# Patient Record
Sex: FEMALE | Race: WHITE | NOT HISPANIC OR LATINO | Employment: OTHER | URBAN - METROPOLITAN AREA
[De-identification: names, ages, dates, MRNs, and addresses within clinical notes are randomized per-mention and may not be internally consistent; named-entity substitution may affect disease eponyms.]

---

## 2017-01-13 ENCOUNTER — APPOINTMENT (OUTPATIENT)
Dept: GENERAL RADIOLOGY | Facility: HOSPITAL | Age: 62
End: 2017-01-13

## 2017-01-13 ENCOUNTER — HOSPITAL ENCOUNTER (EMERGENCY)
Facility: HOSPITAL | Age: 62
Discharge: HOME | End: 2017-01-13
Attending: EMERGENCY MEDICINE | Admitting: EMERGENCY MEDICINE

## 2017-01-13 VITALS
WEIGHT: 165 LBS | HEIGHT: 61 IN | BODY MASS INDEX: 31.15 KG/M2 | TEMPERATURE: 97.9 F | HEART RATE: 65 BPM | DIASTOLIC BLOOD PRESSURE: 76 MMHG | RESPIRATION RATE: 16 BRPM | SYSTOLIC BLOOD PRESSURE: 115 MMHG | OXYGEN SATURATION: 95 %

## 2017-01-13 DIAGNOSIS — IMO0001 ELEVATED BLOOD PRESSURE: ICD-10-CM

## 2017-01-13 DIAGNOSIS — R10.13 EPIGASTRIC ABDOMINAL PAIN: ICD-10-CM

## 2017-01-13 DIAGNOSIS — R07.89 ATYPICAL CHEST PAIN: Primary | ICD-10-CM

## 2017-01-13 LAB
ALBUMIN SERPL-MCNC: 4.2 G/DL (ref 3.2–4.8)
ALBUMIN/GLOB SERPL: 1.1 G/DL (ref 1.5–2.5)
ALP SERPL-CCNC: 133 U/L (ref 25–100)
ALT SERPL W P-5'-P-CCNC: 111 U/L (ref 7–40)
ANION GAP SERPL CALCULATED.3IONS-SCNC: 8 MMOL/L (ref 3–11)
AST SERPL-CCNC: 79 U/L (ref 0–33)
BASOPHILS # BLD AUTO: 0.02 10*3/MM3 (ref 0–0.2)
BASOPHILS NFR BLD AUTO: 0.2 % (ref 0–1)
BILIRUB SERPL-MCNC: 1.1 MG/DL (ref 0.3–1.2)
BNP SERPL-MCNC: 64 PG/ML (ref 0–100)
BUN BLD-MCNC: 11 MG/DL (ref 9–23)
BUN/CREAT SERPL: 12.2 (ref 7–25)
CALCIUM SPEC-SCNC: 9.7 MG/DL (ref 8.7–10.4)
CHLORIDE SERPL-SCNC: 103 MMOL/L (ref 99–109)
CO2 SERPL-SCNC: 26 MMOL/L (ref 20–31)
CREAT BLD-MCNC: 0.9 MG/DL (ref 0.6–1.3)
DEPRECATED RDW RBC AUTO: 46.3 FL (ref 37–54)
EOSINOPHIL # BLD AUTO: 0.08 10*3/MM3 (ref 0.1–0.3)
EOSINOPHIL NFR BLD AUTO: 0.8 % (ref 0–3)
ERYTHROCYTE [DISTWIDTH] IN BLOOD BY AUTOMATED COUNT: 12.5 % (ref 11.3–14.5)
GFR SERPL CREATININE-BSD FRML MDRD: 64 ML/MIN/1.73
GLOBULIN UR ELPH-MCNC: 3.9 GM/DL
GLUCOSE BLD-MCNC: 109 MG/DL (ref 70–100)
HCT VFR BLD AUTO: 48.6 % (ref 34.5–44)
HGB BLD-MCNC: 16.5 G/DL (ref 11.5–15.5)
HOLD SPECIMEN: NORMAL
HOLD SPECIMEN: NORMAL
IMM GRANULOCYTES # BLD: 0.02 10*3/MM3 (ref 0–0.03)
IMM GRANULOCYTES NFR BLD: 0.2 % (ref 0–0.6)
LIPASE SERPL-CCNC: 35 U/L (ref 6–51)
LYMPHOCYTES # BLD AUTO: 2.83 10*3/MM3 (ref 0.6–4.8)
LYMPHOCYTES NFR BLD AUTO: 28.9 % (ref 24–44)
MCH RBC QN AUTO: 34 PG (ref 27–31)
MCHC RBC AUTO-ENTMCNC: 34 G/DL (ref 32–36)
MCV RBC AUTO: 100.2 FL (ref 80–99)
MONOCYTES # BLD AUTO: 1.02 10*3/MM3 (ref 0–1)
MONOCYTES NFR BLD AUTO: 10.4 % (ref 0–12)
NEUTROPHILS # BLD AUTO: 5.83 10*3/MM3 (ref 1.5–8.3)
NEUTROPHILS NFR BLD AUTO: 59.5 % (ref 41–71)
PLATELET # BLD AUTO: 222 10*3/MM3 (ref 150–450)
PMV BLD AUTO: 9.7 FL (ref 6–12)
POTASSIUM BLD-SCNC: 4 MMOL/L (ref 3.5–5.5)
PROT SERPL-MCNC: 8.1 G/DL (ref 5.7–8.2)
RBC # BLD AUTO: 4.85 10*6/MM3 (ref 3.89–5.14)
SODIUM BLD-SCNC: 137 MMOL/L (ref 132–146)
TROPONIN I SERPL-MCNC: 0 NG/ML (ref 0–0.07)
WBC NRBC COR # BLD: 9.8 10*3/MM3 (ref 3.5–10.8)
WHOLE BLOOD HOLD SPECIMEN: NORMAL
WHOLE BLOOD HOLD SPECIMEN: NORMAL

## 2017-01-13 PROCEDURE — 36415 COLL VENOUS BLD VENIPUNCTURE: CPT

## 2017-01-13 PROCEDURE — 99283 EMERGENCY DEPT VISIT LOW MDM: CPT

## 2017-01-13 PROCEDURE — 84484 ASSAY OF TROPONIN QUANT: CPT

## 2017-01-13 PROCEDURE — 85025 COMPLETE CBC W/AUTO DIFF WBC: CPT | Performed by: EMERGENCY MEDICINE

## 2017-01-13 PROCEDURE — 71010 HC CHEST PA OR AP: CPT

## 2017-01-13 PROCEDURE — 80053 COMPREHEN METABOLIC PANEL: CPT | Performed by: EMERGENCY MEDICINE

## 2017-01-13 PROCEDURE — 93005 ELECTROCARDIOGRAM TRACING: CPT

## 2017-01-13 PROCEDURE — 83880 ASSAY OF NATRIURETIC PEPTIDE: CPT | Performed by: EMERGENCY MEDICINE

## 2017-01-13 PROCEDURE — 83690 ASSAY OF LIPASE: CPT | Performed by: EMERGENCY MEDICINE

## 2017-01-13 RX ORDER — ATORVASTATIN CALCIUM 40 MG/1
40 TABLET, FILM COATED ORAL DAILY
COMMUNITY
End: 2022-04-21

## 2017-01-13 RX ORDER — CLONIDINE HYDROCHLORIDE 0.1 MG/1
0.1 TABLET ORAL 3 TIMES DAILY PRN
Qty: 30 TABLET | Refills: 0 | Status: SHIPPED | OUTPATIENT
Start: 2017-01-13 | End: 2022-04-21

## 2017-01-13 RX ORDER — ASPIRIN 81 MG/1
81 TABLET, CHEWABLE ORAL DAILY
COMMUNITY

## 2017-01-13 RX ORDER — SODIUM CHLORIDE 0.9 % (FLUSH) 0.9 %
10 SYRINGE (ML) INJECTION AS NEEDED
Status: DISCONTINUED | OUTPATIENT
Start: 2017-01-13 | End: 2017-01-13 | Stop reason: HOSPADM

## 2017-01-13 RX ORDER — ASPIRIN 81 MG/1
324 TABLET, CHEWABLE ORAL ONCE
Status: DISCONTINUED | OUTPATIENT
Start: 2017-01-13 | End: 2017-01-13

## 2017-01-13 NOTE — DISCHARGE INSTRUCTIONS
If the top number of your blood pressure if over 150 or the bottom number is over 100, take a clonidine every 8 hours until symptoms improve.     If you begin having worsening chest pain return to the ED for re-evaluation.     Follow up with the heart and valve clinic, call for an appointment.     Measure your blood pressure 3 times per day at the same time each day for the next week and keep a log, bring it to Dr. Preston.     Follow up with Dr. Preston next week, call for an appointment.

## 2017-01-13 NOTE — ED PROVIDER NOTES
Subjective   HPI Comments: Funmilayo Cantu is a 61 y.o.female who presents to the ED with c/o CP. 4 days ago she began having a left sided chest tightness that radiated into her right shoulder, dizziness and mild nausea. She took 325 ASA and called her PCP who could not see her until today and went to bed. Upon waking her CP and nausea resolved but she had residual dizziness that has continued. Yesterday she began having epigastric abdominal pain and called her cardiologist who could not see her this week. Today she began having a HA and left sided CP again that radiated into her left shoulder and felt as though she could not take a deep breath. She was seen by her PCP at 1030 where her BP was elevated at 150/125 with no known hx of HTN and was given bystolic, ASA, clonidine and told to come to the ED. In the ED pt states that she continues to have a little bit of discomfort and dizziness but denies any current abdominal pain. She denies any diaphoresis, vomiting, diarrhea, fevers or other acute complaints.     Cardiologist Kwan     Patient is a 61 y.o. female presenting with chest pain.   History provided by:  Patient  Chest Pain   Pain location:  L chest  Pain quality: tightness    Pain radiates to:  L shoulder and R shoulder  Pain severity:  Mild  Onset quality:  Sudden  Duration:  5 days  Timing:  Intermittent  Progression:  Unchanged  Chronicity:  New  Relieved by:  Nothing  Worsened by:  Nothing  Associated symptoms: abdominal pain, dizziness, headache, nausea and shortness of breath (difficulty taking deep breath)    Associated symptoms: no cough, no diaphoresis, no fever, no lower extremity edema and no vomiting        Review of Systems   Constitutional: Negative for diaphoresis and fever.   Respiratory: Positive for shortness of breath (difficulty taking deep breath). Negative for cough.    Cardiovascular: Positive for chest pain.   Gastrointestinal: Positive for abdominal pain and nausea.  Negative for vomiting.   Neurological: Positive for dizziness and headaches.   All other systems reviewed and are negative.      Past Medical History   Diagnosis Date   • Hyperlipidemia        No Known Allergies    Past Surgical History   Procedure Laterality Date   • Breast biopsy       u/s guided   • Cholecystectomy     • Kidney stone surgery     • Dental procedure         Family History   Problem Relation Age of Onset   • Ovarian cancer Sister 49   • Breast cancer Other 30       Social History     Social History   • Marital status:      Spouse name: N/A   • Number of children: N/A   • Years of education: N/A     Social History Main Topics   • Smoking status: Never Smoker   • Smokeless tobacco: None   • Alcohol use Yes      Comment: 1 cocktail a day   • Drug use: No   • Sexual activity: Not Asked     Other Topics Concern   • None     Social History Narrative   • None         Objective   Physical Exam   Constitutional: She is oriented to person, place, and time. She appears well-developed and well-nourished. No distress.   HENT:   Head: Normocephalic and atraumatic.   Airway patent.    Eyes: Conjunctivae are normal. No scleral icterus.   Neck: Normal range of motion. Neck supple. No thyromegaly present.   Cardiovascular: Normal rate, regular rhythm and normal heart sounds.    Pulmonary/Chest: Effort normal and breath sounds normal. No respiratory distress. She exhibits tenderness (mild left pectoral tenderness.).   Abdominal: Soft. There is tenderness (mild LUQ/epigastric TTP ). There is no guarding.   Musculoskeletal: She exhibits no edema or tenderness.   No calf tenderness.    Lymphadenopathy:     She has no cervical adenopathy.   Neurological: She is alert and oriented to person, place, and time.   Skin: Skin is warm and dry. She is not diaphoretic.   Psychiatric: She has a normal mood and affect. Her behavior is normal.   Nursing note and vitals reviewed.      Procedures         ED Course  ED Course    Comment By Time   Old labs including LFTs and H/H reviewed by Dr. Mike Rhoades S Maryan 01/13 1502   On re-examination pt states that she feels improved. Dr. Mckeon discussed all labs and imaging with the pt and discharge. Discussed indications for return. Pt and spouse are agreeable with the plan of care.  Jf SERVIN Maryan 01/13 7863       Course of Care      Lab Results (last 24 hours)     Procedure Component Value Units Date/Time    Comprehensive Metabolic Panel [[id number]]  (Abnormal) Collected:  01/13/17 1055    Specimen:  Blood Updated:  01/13/17 1340     Glucose 105 (H) mg/dL      BUN 11 mg/dL      Creatinine 0.90 mg/dL      Sodium 137 mmol/L      Potassium 4.1 mmol/L      Chloride 103 mmol/L      CO2 28.0 mmol/L      Calcium 9.6 mg/dL      Total Protein 7.9 g/dL      Albumin 4.30 g/dL      ALT (SGPT) 108 (H) U/L      AST (SGOT) 79 (H) U/L      Alkaline Phosphatase 140 (H) U/L      Total Bilirubin 1.1 mg/dL      eGFR Non African Amer 64 mL/min/1.73      Globulin 3.6 gm/dL      A/G Ratio 1.2 (L) g/dL      BUN/Creatinine Ratio 12.2      Anion Gap 6.0 mmol/L     Narrative:       National Kidney Foundation Guidelines    Stage                           Description                             GFR                      1                               Normal or High                          90+  2                               Mild decrease                            60-89  3                               Moderate decrease                   30-59  4                               Severe decrease                       15-29  5                               Kidney failure                             <15    CBC & Differential [[id number]] Collected:  01/13/17 1055    Specimen:  Blood Updated:  01/13/17 1235    Narrative:       The following orders were created for panel order CBC & Differential.  Procedure                               Abnormality         Status                     ---------                                -----------         ------                     CBC Auto Differential[[id number]]         Abnormal            Final result                 Please view results for these tests on the individual orders.    TSH [[id number]]  (Normal) Collected:  01/13/17 1055    Specimen:  Blood Updated:  01/13/17 1340     TSH 2.144 mIU/mL     D-dimer, Quantitative [[id number]]  (Normal) Collected:  01/13/17 1055    Specimen:  Blood Updated:  01/13/17 1247     D-Dimer, Quantitative 0.46 mg/L (FEU)     Narrative:       Negative predictive value for exclusion of venous thromboembolism: < or = 0.5 mg/L (FEU)    CK-MB [[id number]]  (Normal) Collected:  01/13/17 1055    Specimen:  Blood Updated:  01/13/17 1340     CKMB 0.20 ng/mL     Troponin [[id number]]  (Normal) Collected:  01/13/17 1055    Specimen:  Blood Updated:  01/13/17 1331     Troponin I <0.006 ng/mL     Narrative:       Ultra Troponin I Reference Range:         <=0.039 ng/mL: Negative    0.04-0.779 ng/mL: Indeterminate Range. Suspicious of MI.  Clinical correlation required.       >=0.78  ng/mL: Consistent with myocardial injury.  Clinical correlation required.    CBC Auto Differential [[id number]]  (Abnormal) Collected:  01/13/17 1055    Specimen:  Blood Updated:  01/13/17 1235     WBC 9.27 10*3/mm3      RBC 4.87 10*6/mm3      Hemoglobin 16.6 (H) g/dL      Hematocrit 49.2 (H) %      .0 (H) fL      MCH 34.1 (H) pg      MCHC 33.7 g/dL      RDW 12.6 %      RDW-SD 46.8 fl      MPV 10.0 fL      Platelets 234 10*3/mm3      Neutrophil % 60.1 %      Lymphocyte % 28.4 %      Monocyte % 10.1 %      Eosinophil % 1.1 %      Basophil % 0.1 %      Immature Grans % 0.2 %      Neutrophils, Absolute 5.57 10*3/mm3      Lymphocytes, Absolute 2.63 10*3/mm3      Monocytes, Absolute 0.94 10*3/mm3      Eosinophils, Absolute 0.10 10*3/mm3      Basophils, Absolute 0.01 10*3/mm3      Immature Grans, Absolute 0.02 10*3/mm3     CBC & Differential [[id number]] Collected:  01/13/17 1236    Specimen:  Blood Updated:   01/13/17 1302    Narrative:       The following orders were created for panel order CBC & Differential.  Procedure                               Abnormality         Status                     ---------                               -----------         ------                     CBC Auto Differential[[id number]]         Abnormal            Final result                 Please view results for these tests on the individual orders.    Comprehensive Metabolic Panel [[id number]]  (Abnormal) Collected:  01/13/17 1236    Specimen:  Blood from Arm, Left Updated:  01/13/17 1315     Glucose 109 (H) mg/dL      BUN 11 mg/dL      Creatinine 0.90 mg/dL      Sodium 137 mmol/L      Potassium 4.0 mmol/L      Chloride 103 mmol/L      CO2 26.0 mmol/L      Calcium 9.7 mg/dL      Total Protein 8.1 g/dL      Albumin 4.20 g/dL      ALT (SGPT) 111 (H) U/L      AST (SGOT) 79 (H) U/L      Alkaline Phosphatase 133 (H) U/L      Total Bilirubin 1.1 mg/dL      eGFR Non African Amer 64 mL/min/1.73      Globulin 3.9 gm/dL      A/G Ratio 1.1 (L) g/dL      BUN/Creatinine Ratio 12.2      Anion Gap 8.0 mmol/L     Narrative:       National Kidney Foundation Guidelines    Stage                           Description                             GFR                      1                               Normal or High                          90+  2                               Mild decrease                            60-89  3                               Moderate decrease                   30-59  4                               Severe decrease                       15-29  5                               Kidney failure                             <15    Lipase [[id number]]  (Normal) Collected:  01/13/17 1236    Specimen:  Blood from Arm, Left Updated:  01/13/17 1315     Lipase 35 U/L     BNP [[id number]]  (Normal) Collected:  01/13/17 1236    Specimen:  Blood from Arm, Left Updated:  01/13/17 1318     BNP 64.0 pg/mL     CBC Auto Differential [[id number]]  (Abnormal)  Collected:  01/13/17 1236    Specimen:  Blood from Arm, Left Updated:  01/13/17 1302     WBC 9.80 10*3/mm3      RBC 4.85 10*6/mm3      Hemoglobin 16.5 (H) g/dL      Hematocrit 48.6 (H) %      .2 (H) fL      MCH 34.0 (H) pg      MCHC 34.0 g/dL      RDW 12.5 %      RDW-SD 46.3 fl      MPV 9.7 fL      Platelets 222 10*3/mm3      Neutrophil % 59.5 %      Lymphocyte % 28.9 %      Monocyte % 10.4 %      Eosinophil % 0.8 %      Basophil % 0.2 %      Immature Grans % 0.2 %      Neutrophils, Absolute 5.83 10*3/mm3      Lymphocytes, Absolute 2.83 10*3/mm3      Monocytes, Absolute 1.02 (H) 10*3/mm3      Eosinophils, Absolute 0.08 (L) 10*3/mm3      Basophils, Absolute 0.02 10*3/mm3      Immature Grans, Absolute 0.02 10*3/mm3     POC Troponin, Rapid [[id number]]  (Normal) Collected:  01/13/17 1241    Specimen:  Blood Updated:  01/13/17 1255     Troponin I 0.00 ng/mL       Serial Number: [id number]    : [id number]             Note: In addition to lab results from this visit, the labs listed above may include labs taken at another facility or during a different encounter within the last 24 hours. Please correlate lab times with ED admission and discharge times for further clarification of the services performed during this visit.    XR Chest 1 View   Final Result   No active disease.       D:  01/13/2017   E:  01/13/2017       This report was finalized on 1/13/2017 2:38 PM by Dr. Michael Keys MD.              Vitals:    01/13/17 1235 01/13/17 1343 01/13/17 1447 01/13/17 1601   BP: 136/86 116/81 121/81 115/76   Pulse:  70 70 65   Resp:    16   Temp:       TempSrc:       SpO2:  98% 96% 95%   Weight:       Height:           Medications   sodium chloride 0.9 % flush 10 mL (not administered)       ECG/EMG Results (last 24 hours)     Procedure Component Value Units Date/Time    ECG 12 Lead [[id number]] Collected:  01/13/17 1230     Updated:  01/13/17 1237    Narrative:       Test Reason : CP  Blood Pressure : **/** mmHG  Vent. Rate  : 092 BPM     Atrial Rate : 092 BPM     P-R Int : 114 ms          QRS Dur : 072 ms      QT Int : 362 ms       P-R-T Axes : 040 043 078 degrees     QTc Int : 447 ms    Normal sinus rhythm  Nonspecific T wave abnormality  Abnormal ECG  No previous ECGs available  Confirmed by VADIM MCKEON MD ([id number]) on 1/13/2017 12:37:07 PM    Referred By:  EDMD           Confirmed By:VADIM MCKEON MD                        MDM  EMR Dragon/Transcription disclaimer:   Much of this encounter note is an electronic transcription/translation of spoken language to printed text. The electronic translation of spoken language may permit erroneous, or at times, nonsensical words or phrases to be inadvertently transcribed; Although I have reviewed the note for such errors, some may still exist.       Final diagnoses:   Atypical chest pain   Elevated blood pressure   Epigastric abdominal pain       Documentation assistance provided by carmen Steinberg.  Information recorded by the scribe was done at my direction and has been verified and validated by me.     Jf Steinberg  01/13/17 1427       Jf Steinberg  01/13/17 1547       Vadim Mckeon MD  01/13/17 1250

## 2017-01-17 ENCOUNTER — HOSPITAL ENCOUNTER (OUTPATIENT)
Dept: CARDIOLOGY | Facility: HOSPITAL | Age: 62
Discharge: HOME | End: 2017-01-17

## 2017-01-17 ENCOUNTER — OFFICE VISIT (OUTPATIENT)
Dept: CARDIOLOGY | Facility: HOSPITAL | Age: 62
End: 2017-01-17

## 2017-01-17 ENCOUNTER — HOSPITAL ENCOUNTER (OUTPATIENT)
Dept: CARDIOLOGY | Facility: HOSPITAL | Age: 62
Discharge: HOME | End: 2017-01-17
Admitting: NURSE PRACTITIONER

## 2017-01-17 VITALS
OXYGEN SATURATION: 93 % | RESPIRATION RATE: 20 BRPM | HEIGHT: 61 IN | SYSTOLIC BLOOD PRESSURE: 138 MMHG | DIASTOLIC BLOOD PRESSURE: 80 MMHG | WEIGHT: 165 LBS | HEART RATE: 69 BPM | TEMPERATURE: 97.9 F | BODY MASS INDEX: 31.15 KG/M2

## 2017-01-17 VITALS — HEIGHT: 61 IN | WEIGHT: 165 LBS | BODY MASS INDEX: 31.15 KG/M2

## 2017-01-17 DIAGNOSIS — I10 ESSENTIAL HYPERTENSION: ICD-10-CM

## 2017-01-17 DIAGNOSIS — R07.2 PRECORDIAL PAIN: ICD-10-CM

## 2017-01-17 DIAGNOSIS — R53.83 FATIGUE, UNSPECIFIED TYPE: ICD-10-CM

## 2017-01-17 DIAGNOSIS — R07.2 PRECORDIAL PAIN: Primary | ICD-10-CM

## 2017-01-17 PROBLEM — E66.9 MILD OBESITY: Status: ACTIVE | Noted: 2017-01-17

## 2017-01-17 PROBLEM — E78.5 HYPERLIPIDEMIA: Status: ACTIVE | Noted: 2017-01-17

## 2017-01-17 PROBLEM — I49.3 PREMATURE VENTRICULAR CONTRACTIONS: Status: ACTIVE | Noted: 2017-01-17

## 2017-01-17 PROBLEM — M19.90 OSTEOARTHRITIS: Status: ACTIVE | Noted: 2017-01-17

## 2017-01-17 PROBLEM — M85.80 OSTEOPENIA: Status: ACTIVE | Noted: 2017-01-17

## 2017-01-17 PROCEDURE — 25010000002 REGADENOSON 0.4 MG/5ML SOLUTION: Performed by: NURSE PRACTITIONER

## 2017-01-17 PROCEDURE — 93017 CV STRESS TEST TRACING ONLY: CPT

## 2017-01-17 PROCEDURE — 99215 OFFICE O/P EST HI 40 MIN: CPT | Performed by: NURSE PRACTITIONER

## 2017-01-17 PROCEDURE — 78452 HT MUSCLE IMAGE SPECT MULT: CPT

## 2017-01-17 PROCEDURE — 78452 HT MUSCLE IMAGE SPECT MULT: CPT | Performed by: INTERNAL MEDICINE

## 2017-01-17 PROCEDURE — A9500 TC99M SESTAMIBI: HCPCS | Performed by: NURSE PRACTITIONER

## 2017-01-17 PROCEDURE — 93018 CV STRESS TEST I&R ONLY: CPT | Performed by: INTERNAL MEDICINE

## 2017-01-17 PROCEDURE — 0 TECHNETIUM SESTAMIBI: Performed by: NURSE PRACTITIONER

## 2017-01-17 RX ADMIN — REGADENOSON 0.4 MG: 0.08 INJECTION, SOLUTION INTRAVENOUS at 14:11

## 2017-01-17 RX ADMIN — Medication 1 DOSE: at 12:35

## 2017-01-17 RX ADMIN — Medication 1 DOSE: at 14:05

## 2017-01-17 NOTE — MR AVS SNAPSHOT
Funmilayo Cantu   2017 9:30 AM   Office Visit    Dept Phone:  [phone]   Encounter #:  [id number]    Provider:  RADHA Kathleen   Department:  [facility]                Your Full Care Plan              Your Updated Medication List          This list is accurate as of: 17 11:52 AM.  Always use your most recent med list.                aspirin 81 MG chewable tablet       atorvastatin 40 MG tablet   Commonly known as:  LIPITOR       CloNIDine 0.1 MG tablet   Commonly known as:  CATAPRES   Take 1 tablet by mouth 3 (Three) Times a Day As Needed for high blood pressure.               You Were Diagnosed With        Codes Comments    Precordial pain    -  Primary ICD-10-CM: R07.2  ICD-9-CM: 786.51     Essential hypertension     ICD-10-CM: I10  ICD-9-CM: 401.9     Fatigue, unspecified type     ICD-10-CM: R53.83  ICD-9-CM: 780.79       Instructions     None    Patient Instructions History      Upcoming Appointments     Visit Type Date Time Department    NEW PATIENT 2017  9:30 AM [facility] [place]    [facility] [place]J [place] 2017 11:55 AM [facility]    [facility] SCAN [place] 2017  1:10 PM [facility]    [facility] NM STRESS [place] 2017  1:55 PM [facility]    [facility] NM SCAN [place] 2017  3:25 PM [facility]    RE-EVALUATION 2/10/2017  9:45 AM [facility]      [employer] Signup     [facility] [employer] allows you to send messages to your doctor, view your test results, renew your prescriptions, schedule appointments, and more. To sign up, go to [employer] and click on the Sign Up Now link in the New User? box. Enter your [employer] Activation Code exactly as it appears below along with the last four digits of your Social Security Number and your Date of Birth () to complete the sign-up process. If you do not sign up before the expiration date, you must request  a new code.    [employer] Activation Code: [email or web address]  Expires: 1/27/2017  3:50 PM    If you have questions, you can email [email or web address] or call [phone] to talk to our [employer] staff. Remember, [employer] is NOT to be used for urgent needs. For medical emergencies, dial 911.               Other Info from Your Visit           Your Appointments     Jan 17, 2017 11:55 AM EST   [facility] CARDIOLOGY NM INJ [place] with [facility] ADMIN [facility] CARDIOLOGY ([place])    [address] [place] [id number] [phone]           No food or drink for 2 hours prior to your test. No caffeine or chocolate 24 hours prior to you test. (this includes coffee, tea, soda, all decaffeinated beverages, cappuccino flavorings, noooz or vivarian, diet medications, excedrin, anacin or any energy drinks) wear comfortable clothing and walking shoes. Bring your insurance cards with you. Bring all medications in their original bottles. If you are diabetic, do not take your diabetic medications after consulting with your primary care physician. if you take insulin, only take half the dose after consulting with your primary care physician. please hold the following medications for 48 hours prior to your test after consulting with your primary care physician. (acebutolo, aggrenox, atenolol, bisoprolol, betaxolol, betapace, calan, cardizem, carvadilol, coreg, corgard, corzide, dilacor xr, diltiazem, inderal, inderal la, isoptin, karlone, labetolol, levatol, nadolol, normodyne, penbutolol, pindolol, propanolol, sectral, sotalol, tenoretic, tiazic, timolol, bystolic, toprol xl, lopressor, metoprolol, trandata, verapamil, verelan, visken, zebeta, zisc, theophylline, mahamed-dur, sio-phyline, qulbron-t, primatene, persantine, dipyrimadiole)            Jan 17, 2017  1:10 PM EST   [facility] CARDIOLOGY NM SCAN [place] with [facility] SCAN ROOM   [facility] CARDIOLOGY ([place])    [address]  [facility] [address] [phone]            Jan 17, 2017  1:55 PM EST   [facility] CARDIOLOGY NM STRESS [place] with [facility] NM STRESS LAB 2   [facility] CARDIOLOGY [facility])    [address] [facility] [address] [phone]           No food or drink for 2 hours prior to your test. No caffeine or chocolate 24 hours prior to you test. (this includes coffee, tea, soda, all decaffeinated beverages, cappuccino flavorings, noooz or vivarian, diet medications, excedrin, anacin or any energy drinks) wear comfortable clothing and walking shoes. Bring your insurance cards with you. Bring all medications in their original bottles. If you are diabetic, do not take your diabetic medications after consulting with your primary care physician. if you take insulin, only take half the dose after consulting with your primary care physician. please hold the following medications for 48 hours prior to your test after consulting with your primary care physician. (acebutolo, aggrenox, atenolol, bisoprolol, betaxolol, betapace, calan, cardizem, carvadilol, coreg, corgard, corzide, dilacor xr, diltiazem, inderal, inderal la, isoptin, karlone, labetolol, levatol, nadolol, normodyne, penbutolol, pindolol, propanolol, sectral, sotalol, tenoretic, tiazic, timolol, bystolic, toprol xl, lopressor, metoprolol, trandata, verapamil, verelan, visken, zebeta, zisc, theophylline, mahamed-dur, sio-phyline, qulbron-t, primatene, persantine, dipyrimadiole)            Jan 17, 2017  3:25 PM EST   [facility] CARDIOLOGY NM SCAN [place] with [facility] NM SCAN ROOM   [facility] CARDIOLOGY ([place])    [address] [facility] [address] [phone]            Feb 10, 2017  9:45 AM EST   REEVALUATION with Alice Tenorio MD   [facility] [place] [facility] (--)    [address] [place] [address] [address] [address] [place] [id number]   [phone]              Allergies     No Known  "Allergies      Reason for Visit     Establish Care s/p Ed Visit for Chest Pain      Vital Signs     Blood Pressure Pulse Temperature Respirations Height Weight    138/80 (BP Location: Left arm, Patient Position: Standing) 69 97.9 °F (36.6 °C) (Temporal Artery ) 20 61\" (154.9 cm) 165 lb (74.8 kg)    Oxygen Saturation Body Mass Index Smoking Status             93% 31.18 kg/m2 Former Smoker         Problems and Diagnoses Noted     High blood pressure    Tiredness    Precordial chest pain        "

## 2017-01-17 NOTE — PROGRESS NOTES
[facility]  [facility]  Chest Pain Clinic    Encounter Date:01/17/2017     Funmilayo Cantu  [address] [facility] [address]  [phone]    1955    ALISE Mendenhall    Funmilayo Cantu is a 61 y.o. female.      Subjective:     Chief Complaint:  Establish Care (s/p Ed Visit for Chest Pain)       HPI :    This pleasant white female presented to the ED on 1/7/17 with a five-day history of chest pain that began with pain in the left upper chest.  The first occurred she didn't think much of it and went to bed.  When she awoke she was lightheaded and had a headache.  She thought she was getting the flu, but never had a fever.  She continued to have headache and lightheadedness and her symptoms progressed to the point where she got short of breath.  Eventually the pain moved into the epigastric area and under both breast.  She eventually went to her PCPs office where she was very hypertensive.  She had no known history of hypertension.  She was given clonidine and their office and told to come to the ED.    Her troponin level was negative as was her chest x-ray.  Her EKG showed a nonspecific T-wave abnormality.  She was referred to our chest pain clinic for consideration of a stress test.  She has been nothing by mouth since midnight and has not had caffeine since noon on 1/16/17.    She had an episode of sharp pain under her left breast while in the office.  She denies fever, chills, night sweats.  There has been no nausea or vomiting, PND, cough, orthopnea.  She has not had any pre-/syncope.  She notes that she has been under quite a bit of stress with multiple family matters lately which have been ongoing.  She has felt very fatigued for the past couple of weeks.  He has a history of PVCs.  She has felt palpitations and fluttering over the past several months when exerting herself for prolonged period.  They resolve with rest.      Cardiac risk factors:  History of  dyslipidemia,  hypertension, sedentary lifestyle, obesity (BMI > 30),  age (>50)  Family history of premature coronary artery disease (male < 55 yrs, female <66 yrs)    No Known Allergies      Current Outpatient Prescriptions:   •  aspirin 81 MG chewable tablet, Chew 81 mg Daily., Disp: , Rfl:   •  atorvastatin (LIPITOR) 40 MG tablet, Take 40 mg by mouth Daily., Disp: , Rfl:   •  CloNIDine (CATAPRES) 0.1 MG tablet, Take 1 tablet by mouth 3 (Three) Times a Day As Needed for high blood pressure., Disp: 30 tablet, Rfl: 0    The following portions of the patient's history were reviewed and updated as appropriate in Epic:  Problem list, allergies, current medications, past medical and surgical history, past social and family history.     Review of Systems   Constitution: Positive for decreased appetite, diaphoresis and weakness. Negative for chills, fever, malaise/fatigue, night sweats, weight gain and weight loss.   HENT: Negative for congestion and nosebleeds.    Eyes: Negative.  Negative for blurred vision and double vision.   Cardiovascular: Positive for chest pain, dyspnea on exertion and palpitations. Negative for claudication, cyanosis, irregular heartbeat, leg swelling, near-syncope, orthopnea, paroxysmal nocturnal dyspnea and syncope.   Respiratory: Positive for snoring. Negative for cough, hemoptysis, shortness of breath, sleep disturbances due to breathing and wheezing.    Endocrine: Negative.    Hematologic/Lymphatic: Negative for adenopathy and bleeding problem. Bruises/bleeds easily.   Skin: Negative.  Negative for rash.   Musculoskeletal: Positive for arthritis and muscle weakness. Negative for falls, muscle cramps and myalgias.   Gastrointestinal: Positive for abdominal pain. Negative for bloating, anorexia, melena, nausea and vomiting.   Genitourinary: Positive for nocturia. Negative for dysuria and hematuria.   Neurological: Positive for excessive daytime sleepiness, dizziness, light-headedness and loss of balance.  "Negative for focal weakness and seizures.   Psychiatric/Behavioral: The patient does not have insomnia.        Objective:     Vitals:    01/17/17 1004 01/17/17 1006 01/17/17 1007   BP: 135/79 143/83 138/80   BP Location: Right arm Left arm Left arm   Patient Position: Sitting Sitting Standing   Cuff Size: Adult     Pulse: 74  69   Resp: 20     Temp: 97.9 °F (36.6 °C)     TempSrc: Temporal Artery      SpO2: 93%     Weight: 165 lb (74.8 kg)     Height: 61\" (154.9 cm)           Physical Exam   Constitutional: She is oriented to person, place, and time. She appears well-developed and well-nourished. No distress.   HENT:   Head: Normocephalic and atraumatic.   Mouth/Throat: Oropharynx is clear and moist. No oropharyngeal exudate.   Eyes: Conjunctivae are normal. Pupils are equal, round, and reactive to light. No scleral icterus.   Neck: No JVD present. No tracheal deviation present. No thyromegaly present.   Cardiovascular: Normal rate, regular rhythm and intact distal pulses.  Exam reveals no gallop and no friction rub.    Murmur heard.  Pulmonary/Chest: Effort normal and breath sounds normal. No respiratory distress. She has no wheezes. She has no rales. She exhibits no tenderness.   Abdominal: Soft. Bowel sounds are normal. She exhibits no distension.   Musculoskeletal: She exhibits no edema.   Neurological: She is alert and oriented to person, place, and time.   Skin: Skin is warm and dry. No rash noted. No erythema. No pallor.   Psychiatric: She has a normal mood and affect.       Lab and Diagnostic Review:    Assessment and Plan:     1. Precordial pain  - Stress Test With Myocardial Perfusion (1 Day); Future    2. Essential hypertension  -Continue clonidine and follow up with PCP    3. Fatigue, unspecified type  -Stress test      *Please note that portions of this note were completed with a voice recognition program. Efforts were made to edit the dictations, but occasionally words are mistranscribed.      "

## 2017-01-18 LAB
BH CV STRESS BP STAGE 1: NORMAL
BH CV STRESS BP STAGE 3: NORMAL
BH CV STRESS COMMENTS STAGE 1: NORMAL
BH CV STRESS DOSE REGADENOSON STAGE 1: 0.4
BH CV STRESS DURATION MIN STAGE 1: 0
BH CV STRESS DURATION MIN STAGE 2: 1
BH CV STRESS DURATION MIN STAGE 3: 1
BH CV STRESS DURATION MIN STAGE 4: 1
BH CV STRESS DURATION SEC STAGE 1: 15
BH CV STRESS DURATION SEC STAGE 2: 0
BH CV STRESS HR STAGE 1: 93
BH CV STRESS HR STAGE 2: 115
BH CV STRESS HR STAGE 3: 104
BH CV STRESS HR STAGE 4: 98
BH CV STRESS PROTOCOL 1: NORMAL
BH CV STRESS RECOVERY BP: NORMAL MMHG
BH CV STRESS RECOVERY HR: 85 BPM
BH CV STRESS STAGE 1: 1
BH CV STRESS STAGE 2: 2
BH CV STRESS STAGE 3: 3
BH CV STRESS STAGE 4: 4
LV EF NUC BP: 89 %
MAXIMAL PREDICTED HEART RATE: 159 BPM
PERCENT MAX PREDICTED HR: 72.96 %
STRESS BASELINE BP: NORMAL MMHG
STRESS BASELINE HR: 68 BPM
STRESS PERCENT HR: 86 %
STRESS POST PEAK BP: NORMAL MMHG
STRESS POST PEAK HR: 116 BPM
STRESS TARGET HR: 135 BPM

## 2017-08-02 ENCOUNTER — TRANSCRIBE ORDERS (OUTPATIENT)
Dept: ADMINISTRATIVE | Facility: HOSPITAL | Age: 62
End: 2017-08-02

## 2017-08-02 DIAGNOSIS — Z12.31 VISIT FOR SCREENING MAMMOGRAM: Primary | ICD-10-CM

## 2017-08-25 ENCOUNTER — TRANSCRIBE ORDERS (OUTPATIENT)
Dept: ADMINISTRATIVE | Facility: HOSPITAL | Age: 62
End: 2017-08-25

## 2017-08-25 DIAGNOSIS — Z78.0 POST-MENOPAUSAL: Primary | ICD-10-CM

## 2017-08-31 ENCOUNTER — APPOINTMENT (OUTPATIENT)
Dept: MAMMOGRAPHY | Facility: HOSPITAL | Age: 62
End: 2017-08-31

## 2017-09-18 ENCOUNTER — APPOINTMENT (OUTPATIENT)
Dept: BONE DENSITY | Facility: HOSPITAL | Age: 62
End: 2017-09-18

## 2017-09-21 ENCOUNTER — HOSPITAL ENCOUNTER (OUTPATIENT)
Dept: MAMMOGRAPHY | Facility: HOSPITAL | Age: 62
Discharge: HOME | End: 2017-09-21
Admitting: PHYSICIAN ASSISTANT

## 2017-09-21 ENCOUNTER — HOSPITAL ENCOUNTER (OUTPATIENT)
Dept: BONE DENSITY | Facility: HOSPITAL | Age: 62
Discharge: HOME | End: 2017-09-21

## 2017-09-21 DIAGNOSIS — Z78.0 POST-MENOPAUSAL: ICD-10-CM

## 2017-09-21 DIAGNOSIS — Z12.31 VISIT FOR SCREENING MAMMOGRAM: ICD-10-CM

## 2017-09-21 PROCEDURE — 77063 BREAST TOMOSYNTHESIS BI: CPT | Performed by: RADIOLOGY

## 2017-09-21 PROCEDURE — 77067 SCR MAMMO BI INCL CAD: CPT | Performed by: RADIOLOGY

## 2017-09-21 PROCEDURE — 77080 DXA BONE DENSITY AXIAL: CPT

## 2017-09-21 PROCEDURE — 77063 BREAST TOMOSYNTHESIS BI: CPT

## 2017-09-21 PROCEDURE — G0202 SCR MAMMO BI INCL CAD: HCPCS

## 2018-11-15 ENCOUNTER — TRANSCRIBE ORDERS (OUTPATIENT)
Dept: ADMINISTRATIVE | Facility: HOSPITAL | Age: 63
End: 2018-11-15

## 2018-11-15 DIAGNOSIS — Z12.31 VISIT FOR SCREENING MAMMOGRAM: Primary | ICD-10-CM

## 2019-01-08 ENCOUNTER — HOSPITAL ENCOUNTER (OUTPATIENT)
Dept: MAMMOGRAPHY | Facility: HOSPITAL | Age: 64
Discharge: HOME | End: 2019-01-08
Admitting: PHYSICIAN ASSISTANT

## 2019-01-08 DIAGNOSIS — Z12.31 VISIT FOR SCREENING MAMMOGRAM: ICD-10-CM

## 2019-01-08 PROCEDURE — 77063 BREAST TOMOSYNTHESIS BI: CPT | Performed by: RADIOLOGY

## 2019-01-08 PROCEDURE — 77067 SCR MAMMO BI INCL CAD: CPT | Performed by: RADIOLOGY

## 2019-01-08 PROCEDURE — 77067 SCR MAMMO BI INCL CAD: CPT

## 2019-01-08 PROCEDURE — 77063 BREAST TOMOSYNTHESIS BI: CPT

## 2019-04-10 ENCOUNTER — APPOINTMENT (OUTPATIENT)
Dept: CT IMAGING | Facility: HOSPITAL | Age: 64
End: 2019-04-10

## 2019-04-10 ENCOUNTER — HOSPITAL ENCOUNTER (EMERGENCY)
Facility: HOSPITAL | Age: 64
Discharge: HOME | End: 2019-04-10
Attending: EMERGENCY MEDICINE | Admitting: EMERGENCY MEDICINE

## 2019-04-10 VITALS
WEIGHT: 170 LBS | RESPIRATION RATE: 16 BRPM | OXYGEN SATURATION: 92 % | HEIGHT: 61 IN | TEMPERATURE: 98.7 F | DIASTOLIC BLOOD PRESSURE: 71 MMHG | HEART RATE: 76 BPM | SYSTOLIC BLOOD PRESSURE: 128 MMHG | BODY MASS INDEX: 32.1 KG/M2

## 2019-04-10 DIAGNOSIS — M54.31 SCIATICA OF RIGHT SIDE: Primary | ICD-10-CM

## 2019-04-10 LAB
BILIRUB UR QL STRIP: NEGATIVE
CLARITY UR: CLEAR
COLOR UR: YELLOW
GLUCOSE UR STRIP-MCNC: NEGATIVE MG/DL
HGB UR QL STRIP.AUTO: NEGATIVE
KETONES UR QL STRIP: NEGATIVE
LEUKOCYTE ESTERASE UR QL STRIP.AUTO: NEGATIVE
NITRITE UR QL STRIP: NEGATIVE
PH UR STRIP.AUTO: 5.5 [PH] (ref 5–8)
PROT UR QL STRIP: NEGATIVE
SP GR UR STRIP: 1.02 (ref 1–1.03)
UROBILINOGEN UR QL STRIP: NORMAL

## 2019-04-10 PROCEDURE — 96376 TX/PRO/DX INJ SAME DRUG ADON: CPT

## 2019-04-10 PROCEDURE — 81003 URINALYSIS AUTO W/O SCOPE: CPT | Performed by: EMERGENCY MEDICINE

## 2019-04-10 PROCEDURE — 72131 CT LUMBAR SPINE W/O DYE: CPT

## 2019-04-10 PROCEDURE — 25010000002 HYDROMORPHONE PER 4 MG: Performed by: EMERGENCY MEDICINE

## 2019-04-10 PROCEDURE — 25010000002 METHYLPREDNISOLONE PER 125 MG: Performed by: EMERGENCY MEDICINE

## 2019-04-10 PROCEDURE — 96375 TX/PRO/DX INJ NEW DRUG ADDON: CPT

## 2019-04-10 PROCEDURE — P9612 CATHETERIZE FOR URINE SPEC: HCPCS

## 2019-04-10 PROCEDURE — 96374 THER/PROPH/DIAG INJ IV PUSH: CPT

## 2019-04-10 PROCEDURE — 25010000002 ONDANSETRON PER 1 MG: Performed by: EMERGENCY MEDICINE

## 2019-04-10 PROCEDURE — 99284 EMERGENCY DEPT VISIT MOD MDM: CPT

## 2019-04-10 RX ORDER — CYCLOBENZAPRINE HCL 10 MG
10 TABLET ORAL NIGHTLY PRN
COMMUNITY
End: 2022-04-21

## 2019-04-10 RX ORDER — AMLODIPINE BESYLATE 5 MG/1
5 TABLET ORAL DAILY
COMMUNITY

## 2019-04-10 RX ORDER — FENOFIBRATE 145 MG/1
145 TABLET, COATED ORAL DAILY
COMMUNITY
End: 2022-04-21

## 2019-04-10 RX ORDER — MELATONIN
1000 DAILY
COMMUNITY

## 2019-04-10 RX ORDER — HYDROMORPHONE HYDROCHLORIDE 1 MG/ML
0.5 INJECTION, SOLUTION INTRAMUSCULAR; INTRAVENOUS; SUBCUTANEOUS ONCE
Status: COMPLETED | OUTPATIENT
Start: 2019-04-10 | End: 2019-04-10

## 2019-04-10 RX ORDER — METHYLPREDNISOLONE SODIUM SUCCINATE 125 MG/2ML
125 INJECTION, POWDER, LYOPHILIZED, FOR SOLUTION INTRAMUSCULAR; INTRAVENOUS ONCE
Status: COMPLETED | OUTPATIENT
Start: 2019-04-10 | End: 2019-04-10

## 2019-04-10 RX ORDER — ONDANSETRON 2 MG/ML
4 INJECTION INTRAMUSCULAR; INTRAVENOUS ONCE
Status: COMPLETED | OUTPATIENT
Start: 2019-04-10 | End: 2019-04-10

## 2019-04-10 RX ORDER — LOSARTAN POTASSIUM 50 MG/1
50 TABLET ORAL DAILY
COMMUNITY

## 2019-04-10 RX ORDER — RANITIDINE 150 MG/1
150 TABLET ORAL NIGHTLY
COMMUNITY
End: 2022-04-21

## 2019-04-10 RX ADMIN — METHYLPREDNISOLONE SODIUM SUCCINATE 125 MG: 125 INJECTION, POWDER, FOR SOLUTION INTRAMUSCULAR; INTRAVENOUS at 12:18

## 2019-04-10 RX ADMIN — ONDANSETRON 4 MG: 2 INJECTION INTRAMUSCULAR; INTRAVENOUS at 15:09

## 2019-04-10 RX ADMIN — HYDROMORPHONE HYDROCHLORIDE 0.5 MG: 1 INJECTION, SOLUTION INTRAMUSCULAR; INTRAVENOUS; SUBCUTANEOUS at 15:09

## 2019-04-10 RX ADMIN — ONDANSETRON 4 MG: 2 INJECTION INTRAMUSCULAR; INTRAVENOUS at 12:18

## 2019-04-10 RX ADMIN — HYDROMORPHONE HYDROCHLORIDE 0.5 MG: 1 INJECTION, SOLUTION INTRAMUSCULAR; INTRAVENOUS; SUBCUTANEOUS at 12:17

## 2019-04-10 NOTE — ED PROVIDER NOTES
Subjective   Funmilayo Cantu is a 63 y.o.female who presents to the ED with complaints of right lower back pain. The patient reports her pain has been waxing and waning since she lifted a laundry basket a few days ago. She has been evaluated by her primary care provider, who gave her a prescription for pain medication. She states she has taken the medication, but she has not experienced any relief. She also complains of increased frequency of urination, but she denies any weakness or abdominal pain. There are no other complaints at this time.         History provided by:  Patient  Back Pain   Pain location: right lower.  Quality:  Aching  Radiates to:  Does not radiate  Pain severity:  Moderate  Pain is:  Same all the time  Onset quality:  Sudden  Duration: few days.  Timing:  Constant  Progression:  Waxing and waning  Chronicity:  New  Context: lifting heavy objects    Relieved by:  Nothing  Worsened by:  Nothing  Ineffective treatments: pain medication.  Associated symptoms: no abdominal pain and no weakness        Review of Systems   Gastrointestinal: Negative for abdominal pain.   Genitourinary: Positive for frequency.   Musculoskeletal: Positive for back pain.   Neurological: Negative for weakness.   All other systems reviewed and are negative.      Past Medical History:   Diagnosis Date   • Hyperlipidemia    • Mild obesity 1/17/2017   • Nephrolithiasis    • Osteoarthritis 1/17/2017   • Osteopenia 1/17/2017   • Premature ventricular contractions 1/17/2017    with appropriate suppression on stress echocardiography.       No Known Allergies    Past Surgical History:   Procedure Laterality Date   • CHOLECYSTECTOMY     • DENTAL PROCEDURE      [place] teeth extraction.   • KIDNEY STONE SURGERY      Lithotripsy x2.   • KNEE ARTHROSCOPY Right    • TONSILLECTOMY         Family History   Problem Relation Age of Onset   • Ovarian cancer Sister 49   • Breast cancer Other 30   • Heart disease Mother    • Cancer Mother    •  Heart attack Father 70   • Hyperlipidemia Brother    • Diabetes Brother    • Heart attack Maternal Grandmother    • Heart attack Maternal Grandfather    • Cancer Paternal Grandfather    • Heart disease Paternal Grandmother        Social History     Socioeconomic History   • Marital status:      Spouse name: Not on file   • Number of children: Not on file   • Years of education: Not on file   • Highest education level: Not on file   Tobacco Use   • Smoking status: Former Smoker     Packs/day: 0.50     Years: 5.00     Pack years: 2.50     Types: Cigarettes     Last attempt to quit: 1970     Years since quittin.3   • Smokeless tobacco: Never Used   Substance and Sexual Activity   • Alcohol use: Yes     Comment: 1 cocktail a day   • Drug use: No   • Sexual activity: Defer   Social History Narrative    Patient drinks 2 servings of caffeine per day. She lives at home.         Objective   Physical Exam   Constitutional: She is oriented to person, place, and time. She appears well-developed and well-nourished. No distress.   HENT:   Head: Normocephalic and atraumatic.   Nose: Nose normal.   Mouth/Throat: Mucous membranes are normal.   Eyes: Conjunctivae are normal. No scleral icterus.   Neck: Normal range of motion. Neck supple.   Cardiovascular: Normal rate, regular rhythm, normal heart sounds and intact distal pulses.   No murmur heard.  Pulmonary/Chest: Effort normal and breath sounds normal. No respiratory distress.   Abdominal: Soft. Bowel sounds are normal. There is no tenderness. There is no CVA tenderness.   Musculoskeletal: Normal range of motion. She exhibits tenderness. She exhibits no edema.   Tenderness to right L3-L4 at the SI.    Neurological: She is alert and oriented to person, place, and time.   Skin: Skin is warm and dry.   Psychiatric: She has a normal mood and affect. Her behavior is normal.   Nursing note and vitals reviewed.      Procedures         ED Course  ED Course as of Apr 10 1423  "  Wed Apr 10, 2019   1416 Patient with no other complaints and feels better.  I discussed the outpatient plan and she agreed with this plan with no reluctance.  I discussed the plan of treatment with her family and friend as well.  [JI]      ED Course User Index  [JI] Albert Bellamy PA     Recent Results (from the past 24 hour(s))   Urinalysis With Microscopic If Indicated (No Culture) - Urine, Catheter    Collection Time: 04/10/19  1:03 PM   Result Value Ref Range    Color, UA Yellow Yellow, Straw    Appearance, UA Clear Clear    pH, UA 5.5 5.0 - 8.0    Specific Gravity, UA 1.018 1.001 - 1.030    Glucose, UA Negative Negative    Ketones, UA Negative Negative    Bilirubin, UA Negative Negative    Blood, UA Negative Negative    Protein, UA Negative Negative    Leuk Esterase, UA Negative Negative    Nitrite, UA Negative Negative    Urobilinogen, UA 1.0 E.U./dL 0.2 - 1.0 E.U./dL     Note: In addition to lab results from this visit, the labs listed above may include labs taken at another facility or during a different encounter within the last 24 hours. Please correlate lab times with ED admission and discharge times for further clarification of the services performed during this visit.    CT Lumbar Spine Without Contrast   Preliminary Result   Mild multilevel disc protrusions, including right   paracentral disc protrusion at L2-3 with mild canal stenosis. No   evidence of acute or healing trauma or significant focal subluxation.       D:  04/10/2019   E:  04/10/2019            Vitals:    04/10/19 1156 04/10/19 1157 04/10/19 1158   BP:   154/86   Pulse:  89    Resp:  20    Temp:  97.8 °F (36.6 °C)    TempSrc:  Oral    SpO2:  94%    Weight: 77.1 kg (170 lb)     Height: 154.9 cm (61\")       Medications   HYDROmorphone (DILAUDID) injection 0.5 mg (not administered)   ondansetron (ZOFRAN) injection 4 mg (not administered)   methylPREDNISolone sodium succinate (SOLU-Medrol) injection 125 mg (125 mg Intravenous Given 4/10/19 " "1218)   HYDROmorphone (DILAUDID) injection 0.5 mg (0.5 mg Intravenous Given 4/10/19 1217)   ondansetron (ZOFRAN) injection 4 mg (4 mg Intravenous Given 4/10/19 1218)     ECG/EMG Results (last 24 hours)     ** No results found for the last 24 hours. **        No orders to display                 Recent Results (from the past 24 hour(s))   Urinalysis With Microscopic If Indicated (No Culture) - Urine, Catheter    Collection Time: 04/10/19  1:03 PM   Result Value Ref Range    Color, UA Yellow Yellow, Straw    Appearance, UA Clear Clear    pH, UA 5.5 5.0 - 8.0    Specific Gravity, UA 1.018 1.001 - 1.030    Glucose, UA Negative Negative    Ketones, UA Negative Negative    Bilirubin, UA Negative Negative    Blood, UA Negative Negative    Protein, UA Negative Negative    Leuk Esterase, UA Negative Negative    Nitrite, UA Negative Negative    Urobilinogen, UA 1.0 E.U./dL 0.2 - 1.0 E.U./dL     Note: In addition to lab results from this visit, the labs listed above may include labs taken at another facility or during a different encounter within the last 24 hours. Please correlate lab times with ED admission and discharge times for further clarification of the services performed during this visit.    CT Lumbar Spine Without Contrast   Preliminary Result   Mild multilevel disc protrusions, including right   paracentral disc protrusion at L2-3 with mild canal stenosis. No   evidence of acute or healing trauma or significant focal subluxation.       D:  04/10/2019   E:  04/10/2019            Vitals:    04/10/19 1156 04/10/19 1157 04/10/19 1158   BP:   154/86   Pulse:  89    Resp:  20    Temp:  97.8 °F (36.6 °C)    TempSrc:  Oral    SpO2:  94%    Weight: 77.1 kg (170 lb)     Height: 154.9 cm (61\")       Medications   HYDROmorphone (DILAUDID) injection 0.5 mg (not administered)   ondansetron (ZOFRAN) injection 4 mg (not administered)   methylPREDNISolone sodium succinate (SOLU-Medrol) injection 125 mg (125 mg Intravenous Given " "4/10/19 1218)   HYDROmorphone (DILAUDID) injection 0.5 mg (0.5 mg Intravenous Given 4/10/19 1217)   ondansetron (ZOFRAN) injection 4 mg (4 mg Intravenous Given 4/10/19 1218)     ECG/EMG Results (last 24 hours)     ** No results found for the last 24 hours. **        No orders to display     Recent Results (from the past 24 hour(s))   Urinalysis With Microscopic If Indicated (No Culture) - Urine, Catheter    Collection Time: 04/10/19  1:03 PM   Result Value Ref Range    Color, UA Yellow Yellow, Straw    Appearance, UA Clear Clear    pH, UA 5.5 5.0 - 8.0    Specific Gravity, UA 1.018 1.001 - 1.030    Glucose, UA Negative Negative    Ketones, UA Negative Negative    Bilirubin, UA Negative Negative    Blood, UA Negative Negative    Protein, UA Negative Negative    Leuk Esterase, UA Negative Negative    Nitrite, UA Negative Negative    Urobilinogen, UA 1.0 E.U./dL 0.2 - 1.0 E.U./dL     Note: In addition to lab results from this visit, the labs listed above may include labs taken at another facility or during a different encounter within the last 24 hours. Please correlate lab times with ED admission and discharge times for further clarification of the services performed during this visit.    CT Lumbar Spine Without Contrast   Preliminary Result   Mild multilevel disc protrusions, including right   paracentral disc protrusion at L2-3 with mild canal stenosis. No   evidence of acute or healing trauma or significant focal subluxation.       D:  04/10/2019   E:  04/10/2019            Vitals:    04/10/19 1156 04/10/19 1157 04/10/19 1158   BP:   154/86   Pulse:  89    Resp:  20    Temp:  97.8 °F (36.6 °C)    TempSrc:  Oral    SpO2:  94%    Weight: 77.1 kg (170 lb)     Height: 154.9 cm (61\")       Medications   HYDROmorphone (DILAUDID) injection 0.5 mg (not administered)   ondansetron (ZOFRAN) injection 4 mg (not administered)   methylPREDNISolone sodium succinate (SOLU-Medrol) injection 125 mg (125 mg Intravenous Given 4/10/19 " 1218)   HYDROmorphone (DILAUDID) injection 0.5 mg (0.5 mg Intravenous Given 4/10/19 1217)   ondansetron (ZOFRAN) injection 4 mg (4 mg Intravenous Given 4/10/19 1218)     ECG/EMG Results (last 24 hours)     ** No results found for the last 24 hours. **        No orders to display             MDM    Final diagnoses:   Sciatica of right side       Documentation assistance provided by carmen Duarte.  Information recorded by the scralannah was done at my direction and has been verified and validated by me.     Brian Duarte  04/10/19 1205       Albert Bellamy PA  04/10/19 0467

## 2019-04-10 NOTE — DISCHARGE INSTRUCTIONS
Return if loss of bowel or bladder control, weakness to lower extremities or numbness in genital region.

## 2020-02-17 ENCOUNTER — TRANSCRIBE ORDERS (OUTPATIENT)
Dept: ADMINISTRATIVE | Facility: HOSPITAL | Age: 65
End: 2020-02-17

## 2020-02-17 DIAGNOSIS — Z12.31 VISIT FOR SCREENING MAMMOGRAM: Primary | ICD-10-CM

## 2020-04-28 ENCOUNTER — APPOINTMENT (OUTPATIENT)
Dept: MAMMOGRAPHY | Facility: HOSPITAL | Age: 65
End: 2020-04-28

## 2020-07-07 ENCOUNTER — HOSPITAL ENCOUNTER (OUTPATIENT)
Dept: MAMMOGRAPHY | Facility: HOSPITAL | Age: 65
Discharge: HOME | End: 2020-07-07
Admitting: PHYSICIAN ASSISTANT

## 2020-07-07 DIAGNOSIS — Z12.31 VISIT FOR SCREENING MAMMOGRAM: ICD-10-CM

## 2020-07-07 PROCEDURE — 77067 SCR MAMMO BI INCL CAD: CPT

## 2020-07-07 PROCEDURE — 77063 BREAST TOMOSYNTHESIS BI: CPT

## 2020-07-07 PROCEDURE — 77067 SCR MAMMO BI INCL CAD: CPT | Performed by: RADIOLOGY

## 2020-07-07 PROCEDURE — 77063 BREAST TOMOSYNTHESIS BI: CPT | Performed by: RADIOLOGY

## 2021-06-30 ENCOUNTER — TRANSCRIBE ORDERS (OUTPATIENT)
Dept: ADMINISTRATIVE | Facility: HOSPITAL | Age: 66
End: 2021-06-30

## 2021-06-30 DIAGNOSIS — Z12.31 VISIT FOR SCREENING MAMMOGRAM: Primary | ICD-10-CM

## 2021-08-09 ENCOUNTER — HOSPITAL ENCOUNTER (OUTPATIENT)
Dept: MAMMOGRAPHY | Facility: HOSPITAL | Age: 66
Discharge: HOME | End: 2021-08-09
Admitting: PHYSICIAN ASSISTANT

## 2021-08-09 DIAGNOSIS — Z12.31 VISIT FOR SCREENING MAMMOGRAM: ICD-10-CM

## 2021-08-09 PROCEDURE — 77067 SCR MAMMO BI INCL CAD: CPT | Performed by: RADIOLOGY

## 2021-08-09 PROCEDURE — 77067 SCR MAMMO BI INCL CAD: CPT

## 2021-08-09 PROCEDURE — 77063 BREAST TOMOSYNTHESIS BI: CPT

## 2021-08-09 PROCEDURE — 77063 BREAST TOMOSYNTHESIS BI: CPT | Performed by: RADIOLOGY

## 2021-08-19 ENCOUNTER — HOSPITAL ENCOUNTER (OUTPATIENT)
Dept: ULTRASOUND IMAGING | Facility: HOSPITAL | Age: 66
Discharge: HOME | End: 2021-08-19
Admitting: RADIOLOGY

## 2021-08-19 DIAGNOSIS — R92.8 ABNORMAL MAMMOGRAM: ICD-10-CM

## 2021-08-19 PROCEDURE — 76642 ULTRASOUND BREAST LIMITED: CPT | Performed by: RADIOLOGY

## 2021-08-19 PROCEDURE — 76642 ULTRASOUND BREAST LIMITED: CPT

## 2022-04-21 ENCOUNTER — OFFICE VISIT (OUTPATIENT)
Dept: ORTHOPEDIC SURGERY | Facility: CLINIC | Age: 67
End: 2022-04-21

## 2022-04-21 VITALS
WEIGHT: 170.4 LBS | SYSTOLIC BLOOD PRESSURE: 126 MMHG | HEIGHT: 61 IN | BODY MASS INDEX: 32.17 KG/M2 | DIASTOLIC BLOOD PRESSURE: 78 MMHG

## 2022-04-21 DIAGNOSIS — M25.561 CHRONIC PAIN OF RIGHT KNEE: Primary | ICD-10-CM

## 2022-04-21 DIAGNOSIS — G89.29 CHRONIC PAIN OF RIGHT KNEE: Primary | ICD-10-CM

## 2022-04-21 DIAGNOSIS — E66.09 CLASS 1 OBESITY DUE TO EXCESS CALORIES WITHOUT SERIOUS COMORBIDITY WITH BODY MASS INDEX (BMI) OF 32.0 TO 32.9 IN ADULT: ICD-10-CM

## 2022-04-21 DIAGNOSIS — M17.11 PRIMARY OSTEOARTHRITIS OF RIGHT KNEE: ICD-10-CM

## 2022-04-21 PROCEDURE — 20610 DRAIN/INJ JOINT/BURSA W/O US: CPT | Performed by: PHYSICIAN ASSISTANT

## 2022-04-21 PROCEDURE — 99204 OFFICE O/P NEW MOD 45 MIN: CPT | Performed by: PHYSICIAN ASSISTANT

## 2022-04-21 RX ORDER — ATORVASTATIN CALCIUM 10 MG/1
TABLET, FILM COATED ORAL
COMMUNITY
Start: 2022-03-21

## 2022-04-21 RX ORDER — FAMOTIDINE 40 MG/1
TABLET, FILM COATED ORAL
COMMUNITY
Start: 2022-04-07

## 2022-04-21 RX ADMIN — TRIAMCINOLONE ACETONIDE 40 MG: 40 INJECTION, SUSPENSION INTRA-ARTICULAR; INTRAMUSCULAR at 08:53

## 2022-04-21 RX ADMIN — LIDOCAINE HYDROCHLORIDE 4 ML: 10 INJECTION, SOLUTION EPIDURAL; INFILTRATION; INTRACAUDAL; PERINEURAL at 08:53

## 2022-04-21 NOTE — PROGRESS NOTES
Procedure   Large Joint Arthrocentesis: R knee  Date/Time: 4/21/2022 8:53 AM  Consent given by: patient  Site marked: site marked  Timeout: Immediately prior to procedure a time out was called to verify the correct patient, procedure, equipment, support staff and site/side marked as required   Supporting Documentation  Indications: pain   Procedure Details  Location: knee - R knee  Preparation: Patient was prepped and draped in the usual sterile fashion  Needle size: 22 G  Approach: anterolateral  Medications administered: 40 mg triamcinolone acetonide 40 MG/ML; 4 mL lidocaine PF 1% 1 %  Patient tolerance: patient tolerated the procedure well with no immediate complications

## 2022-04-21 NOTE — PROGRESS NOTES
[facility] [place] [facility] Note    Subjective     Chief Complaint   Patient presents with   • Right Knee - Pain        HPI  Funmilayo Cantu is a 66 y.o. female.  No patient presents for evaluation of right knee pain.  Symptoms/pain have been ongoing approximately 10 years.  MARIE: No specific history of injury or trauma.  She did have a right knee arthroscopy in 2012 by Dr. Meadows, debridement.    Pain scale: 6/10.  Severity of the pain moderate.  Quality of the pain aching.  Associated symptoms giving away, buckling.  Activity related to pain walking, stair climbing.  Pain relieved by  sitting, lying down.  No reported numbness or tingling.  Prior treatments none patient has not had injections or formal therapy.    Denies aure locking but reports that the knee does catch at times, causing increased pain.    Denies fever, chills, night sweats or other constitutional symptoms.      Past Medical History:   Diagnosis Date   • Hyperlipidemia    • Mild obesity 1/17/2017   • Nephrolithiasis    • Osteoarthritis 1/17/2017   • Osteopenia 1/17/2017   • Premature ventricular contractions 1/17/2017    with appropriate suppression on stress echocardiography.      Past Surgical History:   Procedure Laterality Date   • CHOLECYSTECTOMY     • DENTAL PROCEDURE      [place] teeth extraction.   • KIDNEY STONE SURGERY      Lithotripsy x2.   • KNEE ARTHROSCOPY Right    • TONSILLECTOMY        Family History   Problem Relation Age of Onset   • Ovarian cancer Sister 49   • Breast cancer Other 30   • Heart disease Mother    • Cancer Mother    • Heart attack Father 70   • Hyperlipidemia Brother    • Diabetes Brother    • Heart attack Maternal Grandmother    • Heart attack Maternal Grandfather    • Cancer Paternal Grandfather    • Heart disease Paternal Grandmother      Social History     Socioeconomic History   • Marital status:    Tobacco Use   • Smoking status: Former Smoker     Packs/day: 0.50     Years: 5.00     Pack  "years: 2.50     Types: Cigarettes     Quit date:      Years since quittin.3   • Smokeless tobacco: Never Used   Vaping Use   • Vaping Use: Never used   Substance and Sexual Activity   • Alcohol use: Yes     Comment: 1 cocktail a day   • Drug use: No   • Sexual activity: Defer      Current Outpatient Medications on File Prior to Visit   Medication Sig Dispense Refill   • amLODIPine (NORVASC) 5 MG tablet Take 5 mg by mouth Daily.     • aspirin 81 MG chewable tablet Chew 81 mg Daily.     • cholecalciferol (VITAMIN D3) 1000 units tablet Take 1,000 Units by mouth Daily.     • losartan (COZAAR) 50 MG tablet Take 50 mg by mouth Daily.     • atorvastatin (LIPITOR) 10 MG tablet      • famotidine (PEPCID) 40 MG tablet        No current facility-administered medications on file prior to visit.      No Known Allergies     The following portions of the patient's history were reviewed and updated as appropriate: allergies, current medications, past family history, past medical history, past social history, past surgical history and problem list.    Review of Systems   Constitutional: Negative.    HENT: Negative.    Eyes: Negative.    Respiratory: Negative.    Cardiovascular: Negative.    Gastrointestinal: Negative.    Endocrine: Negative.    Genitourinary: Negative.    Musculoskeletal: Positive for arthralgias.   Skin: Negative.    Allergic/Immunologic: Negative.    Neurological: Negative.    Hematological: Negative.    Psychiatric/Behavioral: Negative.         Objective      Physical Exam  /78   Ht 154.9 cm (60.98\")   Wt 77.3 kg (170 lb 6.4 oz)   LMP  (LMP Unknown)   BMI 32.21 kg/m²     Body mass index is 32.21 kg/m².    GENERAL APPEARANCE: awake, alert & oriented x 3, in no acute distress and well developed, well nourished  PSYCH: normal mood and affect  LUNGS:  breathing nonlabored, no wheezing  EYES: sclera anicteric, pupils equal  CARDIOVASCULAR: palpable pulses. Capillary refill less than 2 " seconds  INTEGUMENTARY: skin intact, no clubbing, cyanosis  NEUROLOGIC:  Normal Sensation          Ortho Exam  Integument:   Right knee: No skin lesions, no rash, no ecchymosis    Neurologic:   Sensation:    Right foot: Intact to light touch on the dorsal and plantar aspect   Motor:    Right lower extremity: 5/5 quadriceps, hamstrings, ankle dorsiflexors, and ankle plantar flexors    Vascular:   Right lower extremity: 2+ dorsalis pedis pulse, prompt capillary refill    Lower Extremities:   Right knee:    Tenderness:  Medial joint line, mal-/retropatellar    Effusion:  Trace    Swelling:  None    Crepitus:  Positive    Atrophy:  None    Range of motion:  Extension: 0°       Flexion: 120°    Instability:  No varus laxity, no valgus laxity, negative anterior drawer    Deformities:  Genu varum    Right hip  Stinchfield: Negative  Passive ER/IR hip: Negative      Imaging/Studies  Ordered right knee plain films.  Imaging read/interpreted by Dr. Pierce.    Imaging Results (Last 7 Days)     Procedure Component Value Units Date/Time    XR Knee 4+ View Right [[id number]] Resulted: 04/21/22 0855     Updated: 04/21/22 0855    Narrative:      Knee X-Ray    Indication: Pain    Study:  Upright AP, Skiers, Lateral, and Sunrise views of Right knee(s)    Comparison: None    Findings:    Patient appears to have moderate to early severe degenerative changes in   the medial compartment.    There are mild degenerative changes in the lateral compartment.    There are mild changes in the patellofemoral compartment.    Patient has overall varus alignment.    Kellgren-Zak rdGrdrrdarddrderd:rd rd3rd Impression:   Moderate to early severe medial compartment and mild patellofemoral   compartment degnerative changes of the knee             Assessment/Plan        ICD-10-CM ICD-9-CM   1. Chronic pain of right knee  M25.561 719.46    G89.29 338.29   2. Primary osteoarthritis of right knee  M17.11 715.16   3. Class 1 obesity due to excess calories  without serious comorbidity with body mass index (BMI) of 32.0 to 32.9 in adult  E66.09 278.00    Z68.32 V85.32       Orders Placed This Encounter   Procedures   • Large Joint Arthrocentesis: R knee   • Large Joint Arthrocentesis   • XR Knee 4+ View Right        -Patient with chronic right knee pain due to osteoarthritis.  -Offered and accepted corticosteroid injection.  Injection was given today.  -Discussed use of visco supplementation.  Will consider at next appointment depending on response to today´s treatments.Placed preauthorization for visco supplementation.  -Recommend OTC NSAIDS/pain medication.  -Follow up in 6 to 8 weeks weeks for repeat evaluation.  -Questions and concerns answered.    History, exam and imaging discussed with Dr. Pierce, who agrees with the above assessment and plan.    After discussing the risks, benefits, indications of injection, the patient gave consent to proceed.  Her right knee was confirmed as the correct joint to be injected with a timeout.  It was then prepped using Hibiclens and injected with a mixture of 4 cc of 1% plain lidocaine and 1 cc of Kenalog (40 mg per mL), without any resistance through the anterior lateral approach, patient in seated position.  Area was cleaned, hemostasis was achieved and a Band-Aid was applied over the injection site.  The patient tolerated procedure well.  I instructed the patient on signs and symptoms of infection.  They should report to the emergency department or return to clinic if any of these develop, for further evaluation and treatment.  Recommended modifying activity for the next 48 hours to include rest, ice, elevation and oral pain medication as needed.        Medical Decision Making  Management Options : over-the-counter medicine and prescription/IM medicine  Data/Risk: radiology tests       Cammie Villagomez PA-C  04/25/22  22:55 EDT               EMR Dragon/Transcription disclaimer:  Much of this encounter note is an  electronic transcription of spoken language to printed text. Electronic transcription of spoken language may permit erroneous, or at times, nonsensical words or phrases to be inadvertently transcribed. Although I have reviewed the note for such errors, some may still exist.

## 2022-04-27 RX ORDER — TRIAMCINOLONE ACETONIDE 40 MG/ML
40 INJECTION, SUSPENSION INTRA-ARTICULAR; INTRAMUSCULAR
Status: COMPLETED | OUTPATIENT
Start: 2022-04-21 | End: 2022-04-21

## 2022-04-27 RX ORDER — LIDOCAINE HYDROCHLORIDE 10 MG/ML
4 INJECTION, SOLUTION EPIDURAL; INFILTRATION; INTRACAUDAL; PERINEURAL
Status: COMPLETED | OUTPATIENT
Start: 2022-04-21 | End: 2022-04-21

## 2022-05-13 ENCOUNTER — TELEPHONE (OUTPATIENT)
Dept: ORTHOPEDIC SURGERY | Facility: CLINIC | Age: 67
End: 2022-05-13

## 2022-05-26 ENCOUNTER — TELEPHONE (OUTPATIENT)
Dept: ORTHOPEDIC SURGERY | Facility: CLINIC | Age: 67
End: 2022-05-26

## 2022-06-07 ENCOUNTER — TELEPHONE (OUTPATIENT)
Dept: ORTHOPEDIC SURGERY | Facility: CLINIC | Age: 67
End: 2022-06-07

## 2022-08-16 ENCOUNTER — OFFICE VISIT (OUTPATIENT)
Dept: ORTHOPEDIC SURGERY | Facility: CLINIC | Age: 67
End: 2022-08-16

## 2022-08-16 VITALS
WEIGHT: 168 LBS | SYSTOLIC BLOOD PRESSURE: 130 MMHG | HEIGHT: 61 IN | BODY MASS INDEX: 31.72 KG/M2 | DIASTOLIC BLOOD PRESSURE: 70 MMHG

## 2022-08-16 DIAGNOSIS — M17.11 PRIMARY OSTEOARTHRITIS OF RIGHT KNEE: Primary | ICD-10-CM

## 2022-08-16 DIAGNOSIS — M25.561 CHRONIC PAIN OF RIGHT KNEE: ICD-10-CM

## 2022-08-16 DIAGNOSIS — G89.29 CHRONIC PAIN OF RIGHT KNEE: ICD-10-CM

## 2022-08-16 PROCEDURE — 20610 DRAIN/INJ JOINT/BURSA W/O US: CPT | Performed by: PHYSICIAN ASSISTANT

## 2022-08-16 RX ADMIN — TRIAMCINOLONE ACETONIDE 40 MG: 40 INJECTION, SUSPENSION INTRA-ARTICULAR; INTRAMUSCULAR at 14:04

## 2022-08-16 RX ADMIN — LIDOCAINE HYDROCHLORIDE 4 ML: 10 INJECTION, SOLUTION EPIDURAL; INFILTRATION; INTRACAUDAL; PERINEURAL at 14:04

## 2022-08-16 NOTE — PROGRESS NOTES
Procedure   Large Joint Arthrocentesis: R knee  Date/Time: 8/16/2022 2:04 PM  Consent given by: patient  Site marked: site marked  Timeout: Immediately prior to procedure a time out was called to verify the correct patient, procedure, equipment, support staff and site/side marked as required   Supporting Documentation  Indications: pain   Procedure Details  Location: knee - R knee  Preparation: Patient was prepped and draped in the usual sterile fashion  Needle size: 22 G  Approach: anterolateral  Medications administered: 4 mL lidocaine PF 1% 1 %; 40 mg triamcinolone acetonide 40 MG/ML  Patient tolerance: patient tolerated the procedure well with no immediate complications

## 2022-08-16 NOTE — PROGRESS NOTES
"    [facility] [place] [facility] Note        Subjective     CC: Follow-up (4 month recheck - Primary osteoarthritis of right knee )      HPI    Funmilayo Cnatu is a 66 y.o. female.  Patient returns today for follow-up evaluation of her right knee.  At last appointment on 4/21/2022 she received a corticosteroid injection.  Patient reports that the injection lasted until about 1 month ago and now pain is returning.    Pain scale: 6/10.  Quality of pain aching.  Associated symptoms giving away.  Pain is worse with walking, stair climbing and rising from a seated position.  Resting and sitting do help.  No reported numbness or tingling.    Overall, patient's symptoms are worsening as corticosteroid injection from April is worn off.    ROS:    Constiutional:Pt denies fever, chills, nausea, or vomiting.  MSK:as above        Objective      Past Medical History  Past Medical History:   Diagnosis Date   • Fracture of wrist 1974   • Hip arthrosis April 2021   • Hyperlipidemia    • Knee swelling 2014   • Low back strain 1995   • Mild obesity 01/17/2017   • Neck strain ?   • Nephrolithiasis    • Osteoarthritis 01/17/2017   • Osteopenia 01/17/2017   • Premature ventricular contractions 01/17/2017    with appropriate suppression on stress echocardiography.   • Tendinitis of knee ?    Maybe         Physical Exam  /70   Ht 154.9 cm (60.98\")   Wt 76.2 kg (168 lb)   LMP  (LMP Unknown)   BMI 31.76 kg/m²     Body mass index is 31.76 kg/m².    Patient is well nourished and well developed.        Ortho Exam  Integument:              Right knee: No skin lesions, no rash, no ecchymosis     Neurologic:              Motor:                          Right lower extremity: 5/5 quadriceps, hamstrings, ankle dorsiflexors, and ankle plantar flexors     Lower Extremities:              Right knee:                          Tenderness:    Medial joint line                          Effusion:          Trace                          " Swelling:          None                          Crepitus:          Positive                          Atrophy:           None                          Range of motion:        Extension:       0°                                                              Flexion:           120°                          Instability:        No varus laxity, no valgus laxity, negative anterior drawer                          Deformities:     Genu varum       Imaging/Labs/EMG Reviewed:  No new imaging today.      Assessment:  1. Primary osteoarthritis of right knee    2. Chronic pain of right knee        Plan:  1. Osteoarthritis right knee causing chronic pain--patient provided repeat corticosteroid injection to her right knee today.  2. We again discussed the use of viscosupplementation series and the need for preauthorization.  She will think about proceeding with these injections.  3. Recommend over-the-counter pain medication as needed.  4. Follow-up in 3 months for repeat evaluation.  5. Questions and concerns answered.    After discussing the risks, benefits, indications of injection, the patient gave consent to proceed.  Her right knee was confirmed as the correct joint to be injected with a timeout.  It was then prepped using Hibiclens and injected with a mixture of 4 cc of 1% plain lidocaine and 1 cc of Kenalog (40 mg per mL), without any resistance through the anterior lateral approach, patient in seated position.  Area was cleaned, hemostasis was achieved and a Band-Aid was applied over the injection site.  The patient tolerated procedure well.  I instructed the patient on signs and symptoms of infection.  They should report to the emergency department or return to clinic if any of these develop, for further evaluation and treatment.  Recommended modifying activity for the next 48 hours to include rest, ice, elevation and oral pain medication as needed.        Cammie Villagomez PA-C  08/16/22  22:10 EDT      Dictated  Utilizing Dragon Dictation.

## 2022-08-22 RX ORDER — TRIAMCINOLONE ACETONIDE 40 MG/ML
40 INJECTION, SUSPENSION INTRA-ARTICULAR; INTRAMUSCULAR
Status: COMPLETED | OUTPATIENT
Start: 2022-08-16 | End: 2022-08-16

## 2022-08-22 RX ORDER — LIDOCAINE HYDROCHLORIDE 10 MG/ML
4 INJECTION, SOLUTION EPIDURAL; INFILTRATION; INTRACAUDAL; PERINEURAL
Status: COMPLETED | OUTPATIENT
Start: 2022-08-16 | End: 2022-08-16

## 2022-11-03 ENCOUNTER — TELEPHONE (OUTPATIENT)
Dept: ORTHOPEDIC SURGERY | Facility: CLINIC | Age: 67
End: 2022-11-03

## 2022-11-03 DIAGNOSIS — M17.11 PRIMARY OSTEOARTHRITIS OF RIGHT KNEE: Primary | ICD-10-CM

## 2022-11-03 NOTE — TELEPHONE ENCOUNTER
Pt. Called. She states she has a 3 mo fu with RADHA Belle coming up and was wanting to know if she should go about getting the gel injections.    Please advise.

## 2022-11-17 ENCOUNTER — CLINICAL SUPPORT (OUTPATIENT)
Dept: ORTHOPEDIC SURGERY | Facility: CLINIC | Age: 67
End: 2022-11-17

## 2022-11-17 DIAGNOSIS — M17.11 PRIMARY OSTEOARTHRITIS OF RIGHT KNEE: ICD-10-CM

## 2022-11-17 PROCEDURE — 20610 DRAIN/INJ JOINT/BURSA W/O US: CPT | Performed by: PHYSICIAN ASSISTANT

## 2022-11-17 RX ORDER — OMEPRAZOLE 40 MG/1
CAPSULE, DELAYED RELEASE ORAL
COMMUNITY
Start: 2022-11-11

## 2022-11-17 NOTE — PROGRESS NOTES
Procedure   Large Joint Arthrocentesis: R knee  Date/Time: 11/17/2022 2:15 PM  Consent given by: patient  Site marked: site marked  Timeout: Immediately prior to procedure a time out was called to verify the correct patient, procedure, equipment, support staff and site/side marked as required   Supporting Documentation  Indications: pain   Procedure Details  Location: knee - R knee  Preparation: Patient was prepped and draped in the usual sterile fashion  Needle size: 22 G  Approach: anterolateral  Medications administered: 30 mg Hyaluronan 30 MG/2ML  Patient tolerance: patient tolerated the procedure well with no immediate complications

## 2022-11-17 NOTE — PROGRESS NOTES
CC: Follow-up right knee osteoarthritis, 1/3 Orthovisc injection today    History of present illness: Patient was given corticosteroid injection on 8/16/2022.  She reports approximately 2 months of relief with the injection.    Patient presents for her first Orthovisc injection to the right knee today.  At this time the patient denies any numbness or tingling into the distal extremity.  No fever, chills, night sweats or other constitutional symptoms.    Orthopedic surgeon: Dr. Pierce    See chart for PMH, PSH, Meds, All - reviewed.    Ortho exam:  Right knee  Skin is intact without redness, warmth or swelling/effusion.  No lesions or evidence of infection noted.  Motor/sensory: Grossly intact L2-S1.    Assessment/plan:  Right knee osteoarthritis    Proceed today with 1/3 of Orthovisc injection.  Patient will follow-up in week for second injection.      After discussing risks of injection the patient gave consent to proceed.  Her right knee was confirmed as the correct joint to be injected with a timeout.  The knee was then prepped with Hibiclens and injected with a prefilled syringe of Orthovisc without any resistance using anterior lateral approach, patient in seated position.  The patient tolerated procedure well.  Hemostasis was achieved and a Band-Aid was applied over the injection site.  I instructed the patient on signs and symptoms of infection.  They should report to the ED if any of these develop.  Recommended modifying activity to include rest, ice, elevation and/or heat along with oral pain medication as needed.

## 2022-12-01 ENCOUNTER — CLINICAL SUPPORT (OUTPATIENT)
Dept: ORTHOPEDIC SURGERY | Facility: CLINIC | Age: 67
End: 2022-12-01

## 2022-12-01 DIAGNOSIS — M17.11 PRIMARY OSTEOARTHRITIS OF RIGHT KNEE: Primary | ICD-10-CM

## 2022-12-01 PROCEDURE — 20610 DRAIN/INJ JOINT/BURSA W/O US: CPT | Performed by: PHYSICIAN ASSISTANT

## 2022-12-01 RX ORDER — AMOXICILLIN 875 MG/1
TABLET, COATED ORAL
COMMUNITY
Start: 2022-11-30

## 2022-12-01 NOTE — PROGRESS NOTES
Procedure   Large Joint Arthrocentesis: R knee  Date/Time: 12/1/2022 2:53 PM  Consent given by: patient  Site marked: site marked  Timeout: Immediately prior to procedure a time out was called to verify the correct patient, procedure, equipment, support staff and site/side marked as required   Supporting Documentation  Indications: pain   Procedure Details  Location: knee - R knee  Preparation: Patient was prepped and draped in the usual sterile fashion  Needle size: 22 G  Approach: anterolateral  Medications administered: 30 mg Hyaluronan 30 MG/2ML  Patient tolerance: patient tolerated the procedure well with no immediate complications

## 2022-12-01 NOTE — PROGRESS NOTES
CC: Follow-up right knee osteoarthritis, 2/3 Orthovisc injection today     History of present illness: Patient presents for her second Orthovisc injection to the right knee today.  At this time the patient denies any numbness or tingling into the distal extremity.  No fever, chills, night sweats or other constitutional symptoms.     Orthopedic surgeon: Dr. Pierce     See chart for PMH, PSH, Meds, All - reviewed.     Ortho exam:  Right knee  Skin is intact without redness, warmth or swelling/effusion.  No lesions or evidence of infection noted.  Motor/sensory: Grossly intact L2-S1.     Assessment/plan:  Right knee osteoarthritis     Proceed today with 2/3 of Orthovisc injection.  Patient will follow-up in week for third injection.       After discussing risks of injection the patient gave consent to proceed.  Her right knee was confirmed as the correct joint to be injected with a timeout.  The knee was then prepped with Hibiclens and injected with a prefilled syringe of Orthovisc without any resistance using anterior lateral approach, patient in seated position.  The patient tolerated procedure well.  Hemostasis was achieved and a Band-Aid was applied over the injection site.  I instructed the patient on signs and symptoms of infection.  They should report to the ED if any of these develop.  Recommended modifying activity to include rest, ice, elevation and/or heat along with oral pain medication as needed.

## 2022-12-08 ENCOUNTER — CLINICAL SUPPORT (OUTPATIENT)
Dept: ORTHOPEDIC SURGERY | Facility: CLINIC | Age: 67
End: 2022-12-08

## 2022-12-08 DIAGNOSIS — M17.11 PRIMARY OSTEOARTHRITIS OF RIGHT KNEE: Primary | ICD-10-CM

## 2022-12-08 PROCEDURE — 20610 DRAIN/INJ JOINT/BURSA W/O US: CPT | Performed by: PHYSICIAN ASSISTANT

## 2022-12-08 NOTE — PROGRESS NOTES
Procedure   Large Joint Arthrocentesis: R knee  Date/Time: 12/8/2022 2:36 PM  Consent given by: patient  Site marked: site marked  Timeout: Immediately prior to procedure a time out was called to verify the correct patient, procedure, equipment, support staff and site/side marked as required   Supporting Documentation  Indications: pain   Procedure Details  Location: knee - R knee  Preparation: Patient was prepped and draped in the usual sterile fashion  Needle size: 23 G  Approach: anterolateral  Medications administered: 30 mg Hyaluronan 30 MG/2ML  Patient tolerance: patient tolerated the procedure well with no immediate complications

## 2022-12-08 NOTE — PROGRESS NOTES
CC: Follow-up right knee osteoarthritis, 3/3 Orthovisc injection today     History of present illness: Patient presents for her third Orthovisc injection to the right knee today.  At this time the patient denies any numbness or tingling into the distal extremity.  No fever, chills, night sweats or other constitutional symptoms.     Orthopedic surgeon: Dr. Pierce     See chart for PMH, PSH, Meds, All - reviewed.     Ortho exam:  Right knee  Skin is intact without redness, warmth or swelling/effusion.  No lesions or evidence of infection noted.  Motor/sensory: Grossly intact L2-S1.     Assessment/plan:  Right knee osteoarthritis     Proceed today with 3/3 of Orthovisc injection.  Patient will follow-up as needed.  If she wishes to repeat viscosupplementation series she understands she can have another series 6 months +1-day from today's injection.  Patient will contact the clinic couple weeks before that for preauthorization.       After discussing risks of injection the patient gave consent to proceed.  Her right knee was confirmed as the correct joint to be injected with a timeout.  The knee was then prepped with Hibiclens and injected with a prefilled syringe of Orthovisc without any resistance using anterior lateral approach, patient in seated position.  The patient tolerated procedure well.  Hemostasis was achieved and a Band-Aid was applied over the injection site.  I instructed the patient on signs and symptoms of infection.  They should report to the ED if any of these develop.  Recommended modifying activity to include rest, ice, elevation and/or heat along with oral pain medication as needed.

## 2023-04-14 ENCOUNTER — TELEPHONE (OUTPATIENT)
Dept: ORTHOPEDIC SURGERY | Facility: CLINIC | Age: 68
End: 2023-04-14

## 2023-04-14 NOTE — TELEPHONE ENCOUNTER
"  Caller: Funmilayo Cantu \"Giovana\"    Relationship to patient: Self    Best call back number: [phone]     Type of visit: FOLLOW UP / RIGHT KNEE / NO NEW INJURY, INCREASED PAIN SINCE END MARCH 2022 / PRIOR GEL INJECTION 12-08-23 (WANTS ADDITIONAL CORTISONE INJECTION IN BETWEEN GEL INJECTIONS)     Requested date: EYE SURGERY & POST OP RECOVERY 04/18 - 04/20/23     AVAILABLE 04/21 ONWARD TILL OUT OF TOWN 05/05 - 05/22 (AVAILABLE ON 05/22)     Additional notes: PLEASE CALL / LEAVE VMAIL TO DISCUSS SCHEDULING RIGHT KNEE CORTISONE INJECTION     THANKS   "

## 2023-04-17 ENCOUNTER — OFFICE VISIT (OUTPATIENT)
Dept: ORTHOPEDIC SURGERY | Facility: CLINIC | Age: 68
End: 2023-04-17
Payer: MEDICARE

## 2023-04-17 VITALS
HEIGHT: 61 IN | SYSTOLIC BLOOD PRESSURE: 146 MMHG | DIASTOLIC BLOOD PRESSURE: 80 MMHG | BODY MASS INDEX: 31.11 KG/M2 | WEIGHT: 164.8 LBS

## 2023-04-17 DIAGNOSIS — G89.29 CHRONIC PAIN OF RIGHT KNEE: ICD-10-CM

## 2023-04-17 DIAGNOSIS — M25.561 CHRONIC PAIN OF RIGHT KNEE: ICD-10-CM

## 2023-04-17 DIAGNOSIS — M17.11 PRIMARY OSTEOARTHRITIS OF RIGHT KNEE: Primary | ICD-10-CM

## 2023-04-17 RX ORDER — LOSARTAN POTASSIUM 50 MG/1
1 TABLET ORAL DAILY
COMMUNITY
Start: 2023-02-01

## 2023-04-17 RX ORDER — CYCLOSPORINE 0.5 MG/ML
EMULSION OPHTHALMIC
COMMUNITY
Start: 2023-02-01

## 2023-04-17 RX ORDER — ATORVASTATIN CALCIUM 10 MG/1
1 TABLET, FILM COATED ORAL
COMMUNITY
Start: 2023-02-20

## 2023-04-17 RX ORDER — PREDNISOLONE ACETATE 10 MG/ML
SUSPENSION/ DROPS OPHTHALMIC
COMMUNITY
Start: 2023-03-27

## 2023-04-17 RX ADMIN — LIDOCAINE HYDROCHLORIDE 4 ML: 10 INJECTION, SOLUTION EPIDURAL; INFILTRATION; INTRACAUDAL; PERINEURAL at 15:18

## 2023-04-17 RX ADMIN — TRIAMCINOLONE ACETONIDE 40 MG: 40 INJECTION, SUSPENSION INTRA-ARTICULAR; INTRAMUSCULAR at 15:18

## 2023-04-17 NOTE — PROGRESS NOTES
"    [facility] [place] [facility] Clinic Note        Subjective     CC: Follow-up (8 month f/u; Primary osteoarthritis of right knee)      HPI    Funmilayo Cantu is a 67 y.o. female.  Patient returns today due to increasing right knee pain.  She underwent viscosupplementation to the right knee with last injection given on 2022.    Pain scale: 5/10.  Quality of pain aching.  Associated symptoms stiffness and giving away/buckling.  Worse with walking, standing, stair climbing, rising from a seated position.    Overall, patient's symptoms are worsening.    ROS:    Constiutional:Pt denies fever, chills, nausea, or vomiting.  MSK:as above        Objective      Past Medical History  Past Medical History:   Diagnosis Date   • Fracture of wrist    • Hip arthrosis 2021   • Hyperlipidemia    • Knee swelling    • Low back strain    • Mild obesity 2017   • Neck strain ?   • Nephrolithiasis    • Osteoarthritis 2017   • Osteopenia 2017   • Premature ventricular contractions 2017    with appropriate suppression on stress echocardiography.   • Tendinitis of knee ?    Maybe     Social History     Socioeconomic History   • Marital status:    Tobacco Use   • Smoking status: Former     Packs/day: 0.50     Years: 5.00     Pack years: 2.50     Types: Cigarettes     Start date: 9/10/1969     Quit date: 1976     Years since quittin.9   • Smokeless tobacco: Never   • Tobacco comments:     High school + one year college   Vaping Use   • Vaping Use: Never used   Substance and Sexual Activity   • Alcohol use: Yes     Alcohol/week: 2.0 standard drinks     Types: 2 Drinks containing 0.5 oz of alcohol per week     Comment: Ulcer   • Drug use: No   • Sexual activity: Yes     Partners: Male     Birth control/protection: Post-menopausal          Physical Exam  /80   Ht 154.9 cm (60.98\")   Wt 74.8 kg (164 lb 12.8 oz)   LMP  (LMP Unknown)   BMI 31.16 kg/m²     Body mass index is " 31.16 kg/m².    Patient is well nourished and well developed.        Ortho Exam  Right knee  Alignment: Genu varum  Skin: Grossly intact without any redness or warmth.  Trace effusion noted.  Tenderness: Positive medial joint line tenderness.  Motion: 0-120 degrees with crepitus.  Testing: Lachman negative.  Varus and valgus stress test negative.  Straight leg raise: Intact.  Motor/sensory: Grossly intact L2-S1.      Imaging/Labs/EMG Reviewed:  Ordered right knee plain films.  Imaging read/interpreted by Dr. Del Cid.    Right Knee Radiographs  Indication: right knee pain  Views: Standing AP's and skiers of both knees, with lateral and sunrise views of the right knee     Comparison: 4/21/2022     Findings:   Near bone-on-bone contact the medial compartment, tricompartmental degeneration, no acute bony abnormalities.  No unusual bony features.  Mild worsening compared to the previous imaging.      Assessment:  1. Primary osteoarthritis of right knee    2. Chronic pain of right knee        Plan:  1. Osteoarthritis right knee causing chronic pain--mild worsening/progression over the last year.  2. Reviewed imaging with the patient.  3. Patient due to have cataract surgery on 4/18/2023--contacted her ophthalmologist's clinic personnel (referral coordinator [Kristie] for Dr. Bowman)--provided okay to proceed with corticosteroid injection today.  4. Offered and accepted corticosteroid injection.  Injection was given today.  5. Recommend OTC NSAIDS/pain medication as needed.  6. Patient due to return to the clinic on/after 6/9/2023 for repeat viscosupplementation series.  Understands to contact the clinic 2 to 3 weeks prior to this appointment so preauthorization can be obtained.  7. Follow up on/after 6/9/2023.  8. Questions and concerns answered.    After discussing the risks, benefits, indications of injection, the patient gave consent to proceed.  Her right knee was confirmed as the correct joint to be injected with a  timeout.  It was then prepped using Hibiclens and injected with a mixture of 4 cc of 1% plain lidocaine and 1 cc of Kenalog (40 mg per mL), without any resistance through the anterior lateral approach, patient in seated position.  Area was cleaned, hemostasis was achieved and a Band-Aid was applied over the injection site.  The patient tolerated procedure well.  I instructed the patient on signs and symptoms of infection.  They should report to the emergency department or return to clinic if any of these develop, for further evaluation and treatment.  Recommended modifying activity for the next 48 hours to include rest, ice, elevation and oral pain medication as needed.        Cammie Villagomez PA-C  04/23/23  10:56 EDT      Dictated Utilizing Dragon Dictation.

## 2023-04-17 NOTE — PROGRESS NOTES
Procedure   - Large Joint Arthrocentesis: R knee on 4/17/2023 3:18 PM  Indications: pain  Details: 22 G needle, anterolateral approach  Medications: 4 mL lidocaine PF 1% 1 %; 40 mg triamcinolone acetonide 40 MG/ML  Outcome: tolerated well, no immediate complications  Procedure, treatment alternatives, risks and benefits explained, specific risks discussed. Consent was given by the patient. Immediately prior to procedure a time out was called to verify the correct patient, procedure, equipment, support staff and site/side marked as required. Patient was prepped and draped in the usual sterile fashion.

## 2023-04-23 RX ORDER — TRIAMCINOLONE ACETONIDE 40 MG/ML
40 INJECTION, SUSPENSION INTRA-ARTICULAR; INTRAMUSCULAR
Status: COMPLETED | OUTPATIENT
Start: 2023-04-17 | End: 2023-04-17

## 2023-04-23 RX ORDER — LIDOCAINE HYDROCHLORIDE 10 MG/ML
4 INJECTION, SOLUTION EPIDURAL; INFILTRATION; INTRACAUDAL; PERINEURAL
Status: COMPLETED | OUTPATIENT
Start: 2023-04-17 | End: 2023-04-17

## 2023-10-23 ENCOUNTER — TELEPHONE (OUTPATIENT)
Dept: ORTHOPEDIC SURGERY | Facility: CLINIC | Age: 68
End: 2023-10-23
Payer: MEDICARE

## 2023-10-23 NOTE — TELEPHONE ENCOUNTER
Caller: PATIENT    Relationship to patient: SELF    Best call back number: [phone]    Chief complaint: RIGHT KNEE PAIN    Type of visit: INJECTION    Requested date: NEXT AVAILABLE APPT     Additional notes: PATIENT WAS CALLING TO SCHEDULE AN INJECTION WITH MS. ALLISON FOR THE RIGHT KNEE. PATIENT STATED SHE WAS UNSURE WHAT TYPE OF INJECTION IT WAS THAT SHE WAS REQUESTING. PATIENT DID RECEIVE A GEL INJECTION IN THE LAST 6 MONTHS SO SHE KNOWS IT WOULDN'T BE A GEL INJECTION AND RECEIVED A KENALOG INJECTION ON 04.17.23. PATIENT WOULD LIKE A CALL BACK TO DISCUSS SCHEDULING ANOTHER INJECTION. PATIENT IS AWARE THAT WE ARE OUT OF NETWORK WITH HER INSURANCE COMPANY AS OF 09.22.23. THANK YOU!

## 2023-10-31 ENCOUNTER — OFFICE VISIT (OUTPATIENT)
Dept: ORTHOPEDIC SURGERY | Facility: CLINIC | Age: 68
End: 2023-10-31
Payer: MEDICARE

## 2023-10-31 VITALS
SYSTOLIC BLOOD PRESSURE: 120 MMHG | DIASTOLIC BLOOD PRESSURE: 84 MMHG | BODY MASS INDEX: 32.28 KG/M2 | WEIGHT: 171 LBS | HEIGHT: 61 IN

## 2023-10-31 DIAGNOSIS — G89.29 CHRONIC PAIN OF RIGHT KNEE: ICD-10-CM

## 2023-10-31 DIAGNOSIS — E66.09 CLASS 1 OBESITY DUE TO EXCESS CALORIES WITHOUT SERIOUS COMORBIDITY WITH BODY MASS INDEX (BMI) OF 31.0 TO 31.9 IN ADULT: ICD-10-CM

## 2023-10-31 DIAGNOSIS — M17.11 PRIMARY OSTEOARTHRITIS OF RIGHT KNEE: Primary | ICD-10-CM

## 2023-10-31 DIAGNOSIS — M25.561 CHRONIC PAIN OF RIGHT KNEE: ICD-10-CM

## 2023-10-31 RX ADMIN — TRIAMCINOLONE ACETONIDE 40 MG: 40 INJECTION, SUSPENSION INTRA-ARTICULAR; INTRAMUSCULAR at 15:22

## 2023-10-31 RX ADMIN — LIDOCAINE HYDROCHLORIDE 4 ML: 10 INJECTION, SOLUTION EPIDURAL; INFILTRATION; INTRACAUDAL; PERINEURAL at 15:22

## 2023-10-31 NOTE — PROGRESS NOTES
[facility] [place] [facility]opa[facility] Clinic Note        Subjective     CC: Follow-up (4 month f/u -- primary osteoarthritis of right knee)      HPI    Funmilayo Cantu is a 67 y.o. female.  Patient returns today for follow-up evaluation of right knee.  She has known osteoarthritis to the knee and receives viscosupplementation and corticosteroid injections to the knee.  Visco series only made the pain tolerable for about a month.  The corticosteroid injections helped for about 2 months.  She is interested in discussing the next step in treatment but does want to proceed with corticosteroid injection today.    Pain scale: 6/10.  Associated symptoms popping, stiffness and giving away.  Pain is worse with walking, standing, stairs, sleeping, rising from a seated position.  She reports the pain wakes her at night.  It is interfering with her quality life and activities of daily living.  Resting, sitting, ice help.    Overall, patient's symptoms are as above.    ROS:    Constiutional:Pt denies fever, chills, nausea, or vomiting.  MSK:as above        Objective      Past Medical History  Past Medical History:   Diagnosis Date    Fracture of wrist     Hip arthrosis 2021    Hyperlipidemia     Knee swelling     Low back strain     Mild obesity 2017    Neck strain ?    Nephrolithiasis     Osteoarthritis 2017    Osteopenia 2017    Premature ventricular contractions 2017    with appropriate suppression on stress echocardiography.    Tendinitis of knee ?    Maybe     Social History     Socioeconomic History    Marital status:    Tobacco Use    Smoking status: Former     Packs/day: 0.50     Years: 5.00     Additional pack years: 0.00     Total pack years: 2.50     Types: Cigarettes     Start date: 9/10/1969     Quit date: 1976     Years since quittin.4    Smokeless tobacco: Never    Tobacco comments:     High school + one year college   Vaping Use    Vaping Use: Never used  "  Substance and Sexual Activity    Alcohol use: Yes     Alcohol/week: 2.0 standard drinks of alcohol     Types: 2 Drinks containing 0.5 oz of alcohol per week     Comment: Ulcer    Drug use: No    Sexual activity: Yes     Partners: Male     Birth control/protection: Post-menopausal          Physical Exam  /84   Ht 156 cm (61.42\")   Wt 77.6 kg (171 lb)   LMP  (LMP Unknown)   BMI 31.87 kg/m²     Body mass index is 31.87 kg/m².    Patient is well nourished and well developed.        Ortho Exam  Right knee  Alignment: Genu varum  Skin: Grossly intact without any redness or warmth.  Trace effusion.  Tenderness: Positive medial joint line.  Motion: 0-120 degrees with crepitus.  Testing: Lachman negative.  Varus and valgus stress test negative.  Straight leg raise: Intact.  Motor/sensory: Grossly intact L2-S1.      Imaging/Labs/EMG Reviewed:  No new imaging today.    Reviewed imaging from 4/17/2023.  Right Knee Radiographs  Indication: right knee pain  Views: Standing AP's and skiers of both knees, with lateral and sunrise views of the right knee     Comparison: 4/21/2022     Findings:   Near bone-on-bone contact the medial compartment, tricompartmental degeneration, no acute bony abnormalities.  No unusual bony features.  Mild worsening compared to the previous imaging.      Assessment:  1. Primary osteoarthritis of right knee    2. Chronic pain of right knee    3. Class 1 obesity due to excess calories without serious comorbidity with body mass index (BMI) of 31.0 to 31.9 in adult        Plan:  Osteoarthritis right knee causing chronic pain--offered and accepted corticosteroid injection.  Injection was given today.  Patient is interested in discussing definitive treatment (TKA).  She understands that with an injection to the knee she needs to wait minimum of 3 months before proceeding with surgery.  Recommend OTC NSAIDS/pain medication as needed.  Patient will be tentatively scheduled for right TKA with Dr." Nehemias in February 2024.    Additionally, patient will return to the clinic for follow-up appointment with Dr. Del Cid and updated knee films sometime in December 2023 or early January 2024.  Questions and concerns answered.    After discussing the risks, benefits, indications of injection, the patient gave consent to proceed.  Her right knee was confirmed as the correct joint to be injected with a timeout.  It was then prepped using Hibiclens and injected with a mixture of 4 cc of 1% plain lidocaine and 1 cc of Kenalog (40 mg per mL), without any resistance through the anterior lateral approach, patient in seated position.  Area was cleaned, hemostasis was achieved and a Band-Aid was applied over the injection site.  The patient tolerated procedure well.  I instructed the patient on signs and symptoms of infection.  They should report to the emergency department or return to clinic if any of these develop, for further evaluation and treatment.  Recommended modifying activity for the next 48 hours to include rest, ice, elevation and oral pain medication as needed.       Cammie Villagomez PA-C  10/31/23  21:02 EDT      Dictated Utilizing Dragon Dictation.

## 2023-10-31 NOTE — PROGRESS NOTES
Procedure   - Large Joint Arthrocentesis: R knee on 10/31/2023 3:22 PM  Indications: pain  Details: 21 G needle, anterolateral approach  Medications: 4 mL lidocaine PF 1% 1 %; 40 mg triamcinolone acetonide 40 MG/ML  Outcome: tolerated well, no immediate complications  Procedure, treatment alternatives, risks and benefits explained, specific risks discussed. Consent was given by the patient. Immediately prior to procedure a time out was called to verify the correct patient, procedure, equipment, support staff and site/side marked as required. Patient was prepped and draped in the usual sterile fashion.

## 2023-11-02 ENCOUNTER — TELEPHONE (OUTPATIENT)
Dept: ORTHOPEDIC SURGERY | Facility: CLINIC | Age: 68
End: 2023-11-02
Payer: MEDICARE

## 2023-11-02 NOTE — TELEPHONE ENCOUNTER
PATIENT CALLED IN FOR PAULINO. SHE WANTED TO LET HER KNOW THAT SHE HAS DECIDED  TO HAVE SX ON HER RIGHT KNEE AND IS REQUESTING DR. DIETZ.     IF PAULINO WILL PLEASE ADVISE ON IF SHE NEEDS TO COME BACK TO CLINIC OR NOTIFY SURGERY SCHEDULERS TO SCHEDULE SX.     CALL BACK # [phone]

## 2023-11-07 RX ORDER — LIDOCAINE HYDROCHLORIDE 10 MG/ML
4 INJECTION, SOLUTION EPIDURAL; INFILTRATION; INTRACAUDAL; PERINEURAL
Status: COMPLETED | OUTPATIENT
Start: 2023-10-31 | End: 2023-10-31

## 2023-11-07 RX ORDER — TRIAMCINOLONE ACETONIDE 40 MG/ML
40 INJECTION, SUSPENSION INTRA-ARTICULAR; INTRAMUSCULAR
Status: COMPLETED | OUTPATIENT
Start: 2023-10-31 | End: 2023-10-31

## 2023-12-06 ENCOUNTER — OFFICE VISIT (OUTPATIENT)
Dept: ORTHOPEDIC SURGERY | Facility: CLINIC | Age: 68
End: 2023-12-06
Payer: MEDICARE

## 2023-12-06 VITALS
HEIGHT: 61 IN | WEIGHT: 166 LBS | BODY MASS INDEX: 31.34 KG/M2 | DIASTOLIC BLOOD PRESSURE: 84 MMHG | SYSTOLIC BLOOD PRESSURE: 128 MMHG

## 2023-12-06 DIAGNOSIS — M17.11 PRIMARY OSTEOARTHRITIS OF RIGHT KNEE: Primary | ICD-10-CM

## 2023-12-06 PROCEDURE — 99214 OFFICE O/P EST MOD 30 MIN: CPT | Performed by: ORTHOPAEDIC SURGERY

## 2023-12-06 RX ORDER — MELOXICAM 7.5 MG/1
15 TABLET ORAL ONCE
OUTPATIENT
Start: 2023-12-06 | End: 2023-12-06

## 2023-12-06 RX ORDER — PREGABALIN 150 MG/1
150 CAPSULE ORAL ONCE
OUTPATIENT
Start: 2023-12-06 | End: 2023-12-06

## 2023-12-06 RX ORDER — CHLORHEXIDINE GLUCONATE 40 MG/ML
SOLUTION TOPICAL DAILY
Qty: 237 ML | Refills: 0 | Status: SHIPPED | OUTPATIENT
Start: 2023-12-06

## 2023-12-06 RX ORDER — ACETAMINOPHEN 325 MG/1
1000 TABLET ORAL ONCE
OUTPATIENT
Start: 2023-12-06 | End: 2023-12-06

## 2023-12-06 NOTE — PROGRESS NOTES
[facility] [place] [facility] [place]    Subjective     Chief Complaint   Patient presents with    Right Knee - Pain        HPI    Funmilayo Cantu is a 67 y.o. female who presents with new problem of: right knee pain.  Onset: atraumatic and gradual in nature. The issue has been ongoing for 4.5 year(s). Pain is a 7/10 on the pain scale. Pain is described as dull, aching and shooting. Associated symptoms include pain, popping, grinding, and giving way/buckling. The pain is worse with walking, standing, sitting, climbing stairs, and rising from seated position; resting improve the pain. Previous treatments have included: oral steroids and steroid injection (last injection 10/31/23).  She has reached a point where she would like to proceed with right total knee arthroplasty surgery.  She has exhausted conservative treatment.  Her  is able to help out postoperatively.  No history of diabetes.  No heart disease.  No blood thinners other than a baby aspirin per day.  No history of clots or clotting disorders.    I have reviewed the following portions of the patient's history and agree with: History of Present Illness and Review of Systems    Patient Active Problem List   Diagnosis    Precordial pain    Essential hypertension    Fatigue    Hyperlipidemia    Mild obesity    Osteoarthritis    Osteopenia    Premature ventricular contractions    Degenerative arthritis of right knee     Past Medical History:   Diagnosis Date    Fracture of wrist 1974    Hip arthrosis April 2021    Hyperlipidemia     Knee swelling 2014    Low back strain 1995    Mild obesity 01/17/2017    Neck strain ?    Nephrolithiasis     Osteoarthritis 01/17/2017    Osteopenia 01/17/2017    Premature ventricular contractions 01/17/2017    with appropriate suppression on stress echocardiography.    Tendinitis of knee ?    Maybe      Past Surgical History:   Procedure Laterality Date    CHOLECYSTECTOMY      DENTAL PROCEDURE      [place] teeth  extraction.    FOOT SURGERY      Remove ingrown toenail    KIDNEY STONE SURGERY      Lithotripsy x2.    KNEE ARTHROSCOPY Right     TONSILLECTOMY        Family History   Problem Relation Age of Onset    Ovarian cancer Sister 49    Cancer Sister         Ovarian    Breast cancer Other 30    Heart disease Mother     Cancer Mother         Non-Hodgkin’s Lymphoma    Heart attack Father 70    Hyperlipidemia Brother     Diabetes Brother     Heart attack Maternal Grandmother     Heart attack Maternal Grandfather     Cancer Paternal Grandfather         Mouth    Heart disease Paternal Grandmother      Social History     Socioeconomic History    Marital status:    Tobacco Use    Smoking status: Former     Packs/day: 0.50     Years: 5.00     Additional pack years: 0.00     Total pack years: 2.50     Types: Cigarettes     Start date: 9/10/1969     Quit date: 1976     Years since quittin.5    Smokeless tobacco: Never    Tobacco comments:     High school + one year college   Vaping Use    Vaping Use: Never used   Substance and Sexual Activity    Alcohol use: Yes     Alcohol/week: 2.0 standard drinks of alcohol     Types: 2 Drinks containing 0.5 oz of alcohol per week     Comment: Ulcer    Drug use: No    Sexual activity: Yes     Partners: Male     Birth control/protection: Post-menopausal      Current Outpatient Medications on File Prior to Visit   Medication Sig Dispense Refill    amLODIPine (NORVASC) 5 MG tablet Take 1 tablet by mouth Daily.      aspirin 81 MG chewable tablet Chew 1 tablet Daily.      atorvastatin (LIPITOR) 10 MG tablet Take 1 tablet by mouth every night at bedtime.      cholecalciferol (VITAMIN D3) 1000 units tablet Take 1 tablet by mouth Daily.      famotidine (PEPCID) 40 MG tablet       losartan (COZAAR) 50 MG tablet Take 1 tablet by mouth Daily.      Restasis 0.05 % ophthalmic emulsion        No current facility-administered medications on file prior to visit.      No Known Allergies      Review of Systems   Constitutional:  Negative for activity change, appetite change, chills, diaphoresis, fatigue, fever and unexpected weight change.   HENT:  Negative for congestion, dental problem, drooling, ear discharge, ear pain, facial swelling, hearing loss, mouth sores, nosebleeds, postnasal drip, rhinorrhea, sinus pressure, sneezing, sore throat, tinnitus, trouble swallowing and voice change.    Eyes:  Negative for photophobia, pain, discharge, redness, itching and visual disturbance.   Respiratory:  Negative for apnea, cough, choking, chest tightness, shortness of breath, wheezing and stridor.    Cardiovascular:  Negative for chest pain, palpitations and leg swelling.   Gastrointestinal:  Negative for abdominal distention, abdominal pain, anal bleeding, blood in stool, constipation, diarrhea, nausea, rectal pain and vomiting.   Endocrine: Negative for cold intolerance, heat intolerance, polydipsia, polyphagia and polyuria.   Genitourinary:  Negative for decreased urine volume, difficulty urinating, dysuria, enuresis, flank pain, frequency, genital sores, hematuria and urgency.   Musculoskeletal:  Positive for arthralgias. Negative for back pain, gait problem, joint swelling, myalgias, neck pain and neck stiffness.   Skin:  Negative for color change, pallor, rash and wound.   Allergic/Immunologic: Negative for environmental allergies, food allergies and immunocompromised state.   Neurological:  Negative for dizziness, tremors, seizures, syncope, facial asymmetry, speech difficulty, weakness, light-headedness, numbness and headaches.   Hematological:  Negative for adenopathy. Does not bruise/bleed easily.   Psychiatric/Behavioral:  Negative for agitation, behavioral problems, confusion, decreased concentration, dysphoric mood, hallucinations, self-injury, sleep disturbance and suicidal ideas. The patient is not nervous/anxious and is not hyperactive.         Objective      Physical Exam  /84   Ht  "156 cm (61.42\")   Wt 75.3 kg (166 lb)   LMP  (LMP Unknown)   BMI 30.94 kg/m²     Body mass index is 30.94 kg/m².    General:   Mental Status:  Alert   Appearance: Cooperative, in no acute distress   Build and Nutrition: Well-nourished well-developed female   Orientation: Alert and oriented to person, place and time   Posture: Normal   Gait: Mild limp on the right    Integument:   Right knee: No skin lesions, no rash, no ecchymosis    Lower Extremities:   Right Knee:    Tenderness:  Medial/lateral joint line tenderness    Effusion:  None    Swelling: None    Crepitus:  Positive    Range of motion:  Extension: 0°       Flexion: 120°  Instability:  No varus laxity, no valgus laxity, negative anterior drawer  Deformities:  None      Imaging/Studies      Imaging Results (Last 24 Hours)       Procedure Component Value Units Date/Time    XR Knee 4+ View Right [[id number]] Resulted: 12/06/23 0928     Updated: 12/06/23 0929    Narrative:      Right Knee Radiographs  Indication: right knee pain  Views: Standing AP's and skiers of both knees, with lateral and sunrise   views of the right knee    Comparison: 4/17/2023    Findings:    Bone-on-bone contact medial compartment, tricompartmental degeneration, no   acute bony abnormalities.  No unusual bony features.  Mild worsening   compared to the previous imaging.            No new imaging today.    Assessment and Plan     Diagnoses and all orders for this visit:    1. Primary osteoarthritis of right knee (Primary)  -     Case Request; Standing  -     Instructions on coughing, deep breathing, and incentive spirometry.; Future  -     CBC and Differential; Future  -     Basic metabolic panel; Future  -     Protime-INR; Future  -     APTT; Future  -     Hemoglobin A1c; Future  -     Sedimentation rate; Future  -     C-reactive protein; Future  -     Tranexamic Acid 1,000 mg in sodium chloride 0.9 % 100 mL  -     Tranexamic Acid 1,000 mg in sodium chloride 0.9 % 100 mL  -     ethyl " alcohol 62 % 2 each  -     ceFAZolin (ANCEF) 2 g in sodium chloride 0.9 % 100 mL IVPB  -     acetaminophen (TYLENOL) tablet 975 mg  -     meloxicam (MOBIC) tablet 15 mg  -     pregabalin (LYRICA) capsule 150 mg  -     Case Request  -     XR Knee 4+ View Right    Other orders  -     Outpatient In A Bed; Standing  -     Follow Anesthesia Guidelines / Protocol; Future  -     Follow Anesthesia Guidelines / Protocol; Standing  -     Nerve Block; Standing  -     Verify NPO Status; Standing  -     Verify The Time Patient Completed ERAS Hydration Drink; Standing  -     SCD (sequential compression device)- to be placed on patient in Pre-op; Standing  -     POC Glucose Once; Standing  -     Clip operative site; Standing  -     Obtain informed consent (if not collected inpatient or PAT); Standing  -     Obtain informed consent  -     Provide instructions to patient regarding NPO status  -     Chlorhexidine Skin Prep - Educate and Review With Patient; Future  -     Provide Patient With ERAS Hydration Instructions  -     Provide Patient With Enhanced Recovery Booklet(s) or Handout  -     Provide Instructions/Handout For Benzoyl Peroxide 5% Wash If Having Shoulder/Arm Surgery (If Prescribed)  -     Provide Instructions/Handout For Bactroban And Chlorhexidine Shower (If Prescribed)  -     Perform A Memory Screening On All Hip/Knee Replacement Patients >Or Equal To 65 Years Or Older  -     Complete A PROMIS And HOOS Or KOOS Survey If Having Hip Or Knee Replacement  -     Provide Patient With Carbo Loading Instructions  -     Provide Patient With ERAS Booklet(s)/Handout  -     Chlorhexidine Gluconate 4 % solution; Shower with solution as directed for 5 days prior to surgery  Dispense: 237 mL; Refill: 0        1. Primary osteoarthritis of right knee        I reviewed my findings with the patient.  Her right knee pain is progressed to the point where she would like to proceed with knee replacement surgery.  She has exhausted  conservative treatment options.  Risks, benefits, alternatives to seizure been discussed.  Please see my counseling note for details.  Please see my counseling note for details.    Surgical Counseling     I have informed the patient of the diagnosis and the prognosis.  Exhaustive conservative treatment modalities have not resulted in long term pain relief.  The symptoms have progressed to the point of daily pain and inability to perform activities of daily living without significant pain. The patient has reached the point of desiring to proceed with total knee arthroplasty after discussing the risks, benefits and alternatives to the procedure.  The surgical procedure itself was discussed in detail. Risks of the procedure were discussed, which included but are not limited to, bleeding, infection, damage to blood vessels and nerves, incomplete pain relief, loosening of the prosthesis (early or late), deep infection (early or late), need for further surgery, loss of limb, deep venous thrombosis, pulmonary embolus, death, heart attack, stroke, kidney failure, liver failure, and anesthetic complications.  In addition, the potential for deep infection developing in the future was discussed, which could require further surgery.  The knee would have to be re-opened, debrided, and potentially remove the prosthesis, which may or may not be replaced in the future.  Also, the possibility for loosening of the prosthesis has been mentioned.  If the prosthesis loosened, a revision arthroplasty could be performed, with results that are not as predictable compared to the original procedure.  The typical rehabilitative course has also been discussed, and full recovery may take up to a year to see the maximum benefit.  The importance of patient cooperation in the rehabilitative efforts has also been discussed.  No guarantees were given.  The patient understands the potential risks versus the benefits and desires to proceed with total  knee arthroplasty at a mutually convenient time.     Return for surgery.      Irvin Del Cid MD  12/06/23  09:30 EST

## 2024-01-23 ENCOUNTER — PRE-ADMISSION TESTING (OUTPATIENT)
Dept: PREADMISSION TESTING | Facility: HOSPITAL | Age: 69
End: 2024-01-23
Payer: MEDICARE

## 2024-01-23 VITALS — WEIGHT: 172.07 LBS | HEIGHT: 61 IN | BODY MASS INDEX: 32.49 KG/M2

## 2024-01-23 DIAGNOSIS — M17.11 PRIMARY OSTEOARTHRITIS OF RIGHT KNEE: ICD-10-CM

## 2024-01-23 LAB
ANION GAP SERPL CALCULATED.3IONS-SCNC: 12 MMOL/L (ref 5–15)
APTT PPP: 29.4 SECONDS (ref 22–39)
BASOPHILS # BLD AUTO: 0.02 10*3/MM3 (ref 0–0.2)
BASOPHILS NFR BLD AUTO: 0.3 % (ref 0–1.5)
BUN SERPL-MCNC: 17 MG/DL (ref 8–23)
BUN/CREAT SERPL: 16.8 (ref 7–25)
CALCIUM SPEC-SCNC: 9.1 MG/DL (ref 8.6–10.5)
CHLORIDE SERPL-SCNC: 107 MMOL/L (ref 98–107)
CO2 SERPL-SCNC: 23 MMOL/L (ref 22–29)
CREAT SERPL-MCNC: 1.01 MG/DL (ref 0.57–1)
CRP SERPL-MCNC: <0.3 MG/DL (ref 0–0.5)
DEPRECATED RDW RBC AUTO: 50 FL (ref 37–54)
EGFRCR SERPLBLD CKD-EPI 2021: 60.8 ML/MIN/1.73
EOSINOPHIL # BLD AUTO: 0.28 10*3/MM3 (ref 0–0.4)
EOSINOPHIL NFR BLD AUTO: 4 % (ref 0.3–6.2)
ERYTHROCYTE [DISTWIDTH] IN BLOOD BY AUTOMATED COUNT: 13 % (ref 12.3–15.4)
ERYTHROCYTE [SEDIMENTATION RATE] IN BLOOD: 31 MM/HR (ref 0–30)
GLUCOSE SERPL-MCNC: 146 MG/DL (ref 65–99)
HBA1C MFR BLD: 6 % (ref 4.8–5.6)
HCT VFR BLD AUTO: 43.6 % (ref 34–46.6)
HGB BLD-MCNC: 14.2 G/DL (ref 12–15.9)
IMM GRANULOCYTES # BLD AUTO: 0.04 10*3/MM3 (ref 0–0.05)
IMM GRANULOCYTES NFR BLD AUTO: 0.6 % (ref 0–0.5)
INR PPP: 0.95 (ref 0.89–1.12)
LYMPHOCYTES # BLD AUTO: 2.64 10*3/MM3 (ref 0.7–3.1)
LYMPHOCYTES NFR BLD AUTO: 37.4 % (ref 19.6–45.3)
MCH RBC QN AUTO: 33.9 PG (ref 26.6–33)
MCHC RBC AUTO-ENTMCNC: 32.6 G/DL (ref 31.5–35.7)
MCV RBC AUTO: 104.1 FL (ref 79–97)
MONOCYTES # BLD AUTO: 0.58 10*3/MM3 (ref 0.1–0.9)
MONOCYTES NFR BLD AUTO: 8.2 % (ref 5–12)
NEUTROPHILS NFR BLD AUTO: 3.49 10*3/MM3 (ref 1.7–7)
NEUTROPHILS NFR BLD AUTO: 49.5 % (ref 42.7–76)
NRBC BLD AUTO-RTO: 0 /100 WBC (ref 0–0.2)
PLATELET # BLD AUTO: 250 10*3/MM3 (ref 140–450)
PMV BLD AUTO: 8.9 FL (ref 6–12)
POTASSIUM SERPL-SCNC: 3.9 MMOL/L (ref 3.5–5.2)
PROTHROMBIN TIME: 12.8 SECONDS (ref 12.2–14.5)
QT INTERVAL: 372 MS
QTC INTERVAL: 415 MS
RBC # BLD AUTO: 4.19 10*6/MM3 (ref 3.77–5.28)
SODIUM SERPL-SCNC: 142 MMOL/L (ref 136–145)
WBC NRBC COR # BLD AUTO: 7.05 10*3/MM3 (ref 3.4–10.8)

## 2024-01-23 PROCEDURE — 80048 BASIC METABOLIC PNL TOTAL CA: CPT

## 2024-01-23 PROCEDURE — 85610 PROTHROMBIN TIME: CPT

## 2024-01-23 PROCEDURE — 85652 RBC SED RATE AUTOMATED: CPT

## 2024-01-23 PROCEDURE — 36415 COLL VENOUS BLD VENIPUNCTURE: CPT

## 2024-01-23 PROCEDURE — 93005 ELECTROCARDIOGRAM TRACING: CPT

## 2024-01-23 PROCEDURE — 85025 COMPLETE CBC W/AUTO DIFF WBC: CPT

## 2024-01-23 PROCEDURE — 86140 C-REACTIVE PROTEIN: CPT

## 2024-01-23 PROCEDURE — 85730 THROMBOPLASTIN TIME PARTIAL: CPT

## 2024-01-23 PROCEDURE — 83036 HEMOGLOBIN GLYCOSYLATED A1C: CPT

## 2024-01-23 NOTE — PAT
An arrival time for procedure was not provided during PAT visit. If patient had any questions or concerns about their arrival time, they were instructed to contact their surgeon/physician.  Additionally, if the patient referred to an arrival time that was acquired from their my chart account, patient was encouraged to verify that time with their surgeon/physician. Arrival times are NOT provided in Pre Admission Testing Department.      Per Anesthesia Request, patient instructed not to take their ACE/ARB medications on the AM of surgery.    Patient instructed to drink 20 ounces of Gatorade or Gatorlyte (if diabetic) and it needs to be completed 1 hour (for Main OR patients) or 2 hours (scheduled  section & [facility] patients) before given arrival time for procedure (NO RED Gatorade and NO Gatorade Zero).    Patient verbalized understanding.    Discussed with patient options for receiving total joint replacement education and assessed patient's ability and preference. Joint Replacement Guide given to patient during PAT visit since not received a copy within the last year. Encouraged patient/family to read guide thoroughly and notify PAT staff with any questions or concerns. Handout provided directing patient to links to watch online videos related to joint replacement surgery on the [facility] website. The handout gives detailed instructions for joining an online joint replacement class through Zoom or phone conference offered on . Patient agreed to participate by watching videos online. Patient verbalized understanding of instructions and to complete the online learning tool survey. Encouraged to share information with family and/or . An overview of the joint replacement education was provided during the visit including general perioperative instructions that are routine for all surgical patients (PAT PASS, wipes, directions to pre-op, etc.).    In[employer]BLOCK (by [employer]) pain pump patient  informational handout given to patient.  Instructed patient to watch [facility] Patient Education Video regarding Peripheral Nerve Catheter that will be in place for upcoming surgery unless contraindicated. The video can be accessed using QR code noted on handout.  Patient agreed to watch video.  Stressed to patient to call [facility] Nursing Hotline 24/7 if patient has any questions or concerns after discharge.     Post-Surgery Information Instruction Sheet given to patient during Pre-Admission Testing Visit with verbal instructions to patient to return with PAT PASS on the day of surgery. Additionally, encouraged patient to review the information provided.    Patient denies any current skin issues.     Patient to apply Chlorhexadine wipes  to surgical area (as instructed) the night before procedure and the AM of procedure. Wipes provided.    Prescription for Chlorhexidine shower called into patient's pharmacy or [facility] pharmacy by patient's surgeon.  Reinforced with patient to  the prescription from applicable pharmacy if they haven't already.  Verbal and written instructions given regarding proper use of Chlorhexidine body wash to patient and/or famlily during PAT visit. Patient/family also instructed to complete checklist and return it to Pre-op on the day of surgery.  Patient and/or family verbalized understanding.    Patient viewed general PAT education video as instructed in their preoperative information received from their surgeon.  Patient stated the general PAT education video was viewed in its entirety and survey completed.  Copies of [facility] general education handouts (Incentive Spirometry, Meds to Beds Program, Patient Belongings, Pre-op skin preparation instructions, Blood Glucose testing, Visitor policy, Surgery FAQ, Code H) distributed to patient if not printed. Education related to the PAT pass and skin preparation for surgery (if applicable) completed in PAT as a reinforcement to PAT education  video. Patient instructed to return PAT pass provided today as well as completed skin preparation sheet (if applicable) on the day of procedure.     Additionally if patient had not viewed video yet but intended to view it at home or in our waiting area, then referred them to the handout with QR code/link provided during PAT visit.  Instructed patient to complete survey after viewing the video in its entirety.  Encouraged patient/family to read PAT general education handouts thoroughly and notify PAT staff with any questions or concerns. Patient verbalized understanding of all information and priority content.    PATIENT EKG ON CHART AND IN EPIC FROM 01/23/2024

## 2024-02-06 ENCOUNTER — ANESTHESIA EVENT CONVERTED (OUTPATIENT)
Dept: ANESTHESIOLOGY | Facility: HOSPITAL | Age: 69
End: 2024-02-06
Payer: MEDICARE

## 2024-02-06 ENCOUNTER — ANESTHESIA EVENT (OUTPATIENT)
Dept: PERIOP | Facility: HOSPITAL | Age: 69
End: 2024-02-06
Payer: MEDICARE

## 2024-02-06 ENCOUNTER — HOSPITAL ENCOUNTER (OUTPATIENT)
Facility: HOSPITAL | Age: 69
Discharge: HOME | End: 2024-02-06
Attending: ORTHOPAEDIC SURGERY | Admitting: ORTHOPAEDIC SURGERY
Payer: MEDICARE

## 2024-02-06 ENCOUNTER — ANESTHESIA (OUTPATIENT)
Dept: PERIOP | Facility: HOSPITAL | Age: 69
End: 2024-02-06
Payer: MEDICARE

## 2024-02-06 ENCOUNTER — APPOINTMENT (OUTPATIENT)
Dept: GENERAL RADIOLOGY | Facility: HOSPITAL | Age: 69
End: 2024-02-06
Payer: MEDICARE

## 2024-02-06 VITALS
TEMPERATURE: 97.5 F | HEIGHT: 61 IN | DIASTOLIC BLOOD PRESSURE: 69 MMHG | HEART RATE: 67 BPM | RESPIRATION RATE: 16 BRPM | OXYGEN SATURATION: 94 % | BODY MASS INDEX: 32.47 KG/M2 | WEIGHT: 172 LBS | SYSTOLIC BLOOD PRESSURE: 121 MMHG

## 2024-02-06 DIAGNOSIS — Z96.651 S/P TOTAL KNEE ARTHROPLASTY, RIGHT: Primary | ICD-10-CM

## 2024-02-06 DIAGNOSIS — M17.11 PRIMARY OSTEOARTHRITIS OF RIGHT KNEE: ICD-10-CM

## 2024-02-06 PROBLEM — K21.9 GERD WITHOUT ESOPHAGITIS: Status: ACTIVE | Noted: 2024-02-06

## 2024-02-06 LAB — GLUCOSE BLDC GLUCOMTR-MCNC: 110 MG/DL (ref 70–130)

## 2024-02-06 PROCEDURE — 25010000002 DEXAMETHASONE PER 1 MG: Performed by: NURSE ANESTHETIST, CERTIFIED REGISTERED

## 2024-02-06 PROCEDURE — 97116 GAIT TRAINING THERAPY: CPT

## 2024-02-06 PROCEDURE — 73560 X-RAY EXAM OF KNEE 1 OR 2: CPT

## 2024-02-06 PROCEDURE — C1776 JOINT DEVICE (IMPLANTABLE): HCPCS | Performed by: ORTHOPAEDIC SURGERY

## 2024-02-06 PROCEDURE — 25010000002 ONDANSETRON PER 1 MG: Performed by: NURSE ANESTHETIST, CERTIFIED REGISTERED

## 2024-02-06 PROCEDURE — 25810000003 LACTATED RINGERS PER 1000 ML: Performed by: ANESTHESIOLOGY

## 2024-02-06 PROCEDURE — 97161 PT EVAL LOW COMPLEX 20 MIN: CPT

## 2024-02-06 PROCEDURE — 25810000003 SODIUM CHLORIDE 0.9 % SOLUTION: Performed by: ORTHOPAEDIC SURGERY

## 2024-02-06 PROCEDURE — 25010000002 CEFAZOLIN PER 500 MG: Performed by: ORTHOPAEDIC SURGERY

## 2024-02-06 PROCEDURE — 82948 REAGENT STRIP/BLOOD GLUCOSE: CPT

## 2024-02-06 PROCEDURE — C1713 ANCHOR/SCREW BN/BN,TIS/BN: HCPCS | Performed by: ORTHOPAEDIC SURGERY

## 2024-02-06 PROCEDURE — 25010000002 ROPIVACAINE PER 1 MG: Performed by: ORTHOPAEDIC SURGERY

## 2024-02-06 PROCEDURE — 25010000002 BUPIVACAINE (PF) 0.25 % SOLUTION: Performed by: ANESTHESIOLOGY

## 2024-02-06 PROCEDURE — 25010000002 PROPOFOL 10 MG/ML EMULSION: Performed by: NURSE ANESTHETIST, CERTIFIED REGISTERED

## 2024-02-06 PROCEDURE — C1755 CATHETER, INTRASPINAL: HCPCS | Performed by: ORTHOPAEDIC SURGERY

## 2024-02-06 PROCEDURE — 25010000002 ROPIVACAINE HCL-NACL 0.2-0.9 % SOLUTION: Performed by: NURSE ANESTHETIST, CERTIFIED REGISTERED

## 2024-02-06 PROCEDURE — 25010000002 BUPIVACAINE 0.5 % SOLUTION: Performed by: ANESTHESIOLOGY

## 2024-02-06 DEVICE — IMPLANTABLE DEVICE: Type: IMPLANTABLE DEVICE | Site: KNEE | Status: FUNCTIONAL

## 2024-02-06 DEVICE — DEV CONTRL TISS STRATAFIX SYMM PDS PLUS VIL CT-1 45CM: Type: IMPLANTABLE DEVICE | Site: KNEE | Status: FUNCTIONAL

## 2024-02-06 DEVICE — CMT BONE PALACOS R HI/VISC 1X40: Type: IMPLANTABLE DEVICE | Site: KNEE | Status: FUNCTIONAL

## 2024-02-06 DEVICE — DEV CONTRL TISS STRATAFIX SPIRAL MNCRYL UD 3/0 PLS 60CM: Type: IMPLANTABLE DEVICE | Site: KNEE | Status: FUNCTIONAL

## 2024-02-06 RX ORDER — SODIUM CHLORIDE 0.9 % (FLUSH) 0.9 %
1-10 SYRINGE (ML) INJECTION AS NEEDED
Status: DISCONTINUED | OUTPATIENT
Start: 2024-02-06 | End: 2024-02-06 | Stop reason: HOSPADM

## 2024-02-06 RX ORDER — MIDAZOLAM HYDROCHLORIDE 1 MG/ML
0.5 INJECTION INTRAMUSCULAR; INTRAVENOUS
Status: DISCONTINUED | OUTPATIENT
Start: 2024-02-06 | End: 2024-02-06 | Stop reason: HOSPADM

## 2024-02-06 RX ORDER — SODIUM CHLORIDE, SODIUM LACTATE, POTASSIUM CHLORIDE, CALCIUM CHLORIDE 600; 310; 30; 20 MG/100ML; MG/100ML; MG/100ML; MG/100ML
9 INJECTION, SOLUTION INTRAVENOUS CONTINUOUS
Status: DISCONTINUED | OUTPATIENT
Start: 2024-02-06 | End: 2024-02-06

## 2024-02-06 RX ORDER — FAMOTIDINE 20 MG/1
20 TABLET, FILM COATED ORAL ONCE
Status: COMPLETED | OUTPATIENT
Start: 2024-02-06 | End: 2024-02-06

## 2024-02-06 RX ORDER — OXYCODONE HYDROCHLORIDE 5 MG/1
5 TABLET ORAL EVERY 4 HOURS PRN
Status: DISCONTINUED | OUTPATIENT
Start: 2024-02-06 | End: 2024-02-06 | Stop reason: HOSPADM

## 2024-02-06 RX ORDER — SODIUM CHLORIDE 0.9 % (FLUSH) 0.9 %
10 SYRINGE (ML) INJECTION EVERY 12 HOURS SCHEDULED
Status: DISCONTINUED | OUTPATIENT
Start: 2024-02-06 | End: 2024-02-06 | Stop reason: HOSPADM

## 2024-02-06 RX ORDER — SODIUM CHLORIDE 0.9 % (FLUSH) 0.9 %
10 SYRINGE (ML) INJECTION AS NEEDED
Status: DISCONTINUED | OUTPATIENT
Start: 2024-02-06 | End: 2024-02-06 | Stop reason: HOSPADM

## 2024-02-06 RX ORDER — PREGABALIN 150 MG/1
150 CAPSULE ORAL ONCE
Status: COMPLETED | OUTPATIENT
Start: 2024-02-06 | End: 2024-02-06

## 2024-02-06 RX ORDER — ASPIRIN 81 MG/1
81 TABLET ORAL 2 TIMES DAILY
Qty: 60 TABLET | Refills: 0 | Status: SHIPPED | OUTPATIENT
Start: 2024-02-07

## 2024-02-06 RX ORDER — MELOXICAM 15 MG/1
15 TABLET ORAL ONCE
Status: COMPLETED | OUTPATIENT
Start: 2024-02-06 | End: 2024-02-06

## 2024-02-06 RX ORDER — MAGNESIUM HYDROXIDE 1200 MG/15ML
LIQUID ORAL AS NEEDED
Status: DISCONTINUED | OUTPATIENT
Start: 2024-02-06 | End: 2024-02-06 | Stop reason: HOSPADM

## 2024-02-06 RX ORDER — NALOXONE HCL 0.4 MG/ML
0.4 VIAL (ML) INJECTION
Status: DISCONTINUED | OUTPATIENT
Start: 2024-02-06 | End: 2024-02-06 | Stop reason: HOSPADM

## 2024-02-06 RX ORDER — HYDROCODONE BITARTRATE AND ACETAMINOPHEN 5; 325 MG/1; MG/1
TABLET ORAL
Status: COMPLETED
Start: 2024-02-06 | End: 2024-02-06

## 2024-02-06 RX ORDER — BUPIVACAINE HYDROCHLORIDE 5 MG/ML
INJECTION, SOLUTION PERINEURAL
Status: COMPLETED | OUTPATIENT
Start: 2024-02-06 | End: 2024-02-06

## 2024-02-06 RX ORDER — PHENYLEPHRINE HCL IN 0.9% NACL 1 MG/10 ML
SYRINGE (ML) INTRAVENOUS AS NEEDED
Status: DISCONTINUED | OUTPATIENT
Start: 2024-02-06 | End: 2024-02-06 | Stop reason: SURG

## 2024-02-06 RX ORDER — HYDROMORPHONE HYDROCHLORIDE 1 MG/ML
0.5 INJECTION, SOLUTION INTRAMUSCULAR; INTRAVENOUS; SUBCUTANEOUS
Status: DISCONTINUED | OUTPATIENT
Start: 2024-02-06 | End: 2024-02-06 | Stop reason: HOSPADM

## 2024-02-06 RX ORDER — DEXAMETHASONE SODIUM PHOSPHATE 4 MG/ML
INJECTION, SOLUTION INTRA-ARTICULAR; INTRALESIONAL; INTRAMUSCULAR; INTRAVENOUS; SOFT TISSUE AS NEEDED
Status: DISCONTINUED | OUTPATIENT
Start: 2024-02-06 | End: 2024-02-06 | Stop reason: SURG

## 2024-02-06 RX ORDER — NALOXONE HCL 0.4 MG/ML
0.1 VIAL (ML) INJECTION
Status: DISCONTINUED | OUTPATIENT
Start: 2024-02-06 | End: 2024-02-06 | Stop reason: HOSPADM

## 2024-02-06 RX ORDER — LABETALOL HYDROCHLORIDE 5 MG/ML
10 INJECTION, SOLUTION INTRAVENOUS EVERY 4 HOURS PRN
Status: DISCONTINUED | OUTPATIENT
Start: 2024-02-06 | End: 2024-02-06 | Stop reason: HOSPADM

## 2024-02-06 RX ORDER — OXYCODONE HYDROCHLORIDE 5 MG/1
5 TABLET ORAL EVERY 4 HOURS PRN
Qty: 40 TABLET | Refills: 0 | Status: SHIPPED | OUTPATIENT
Start: 2024-02-06

## 2024-02-06 RX ORDER — SODIUM CHLORIDE 9 MG/ML
40 INJECTION, SOLUTION INTRAVENOUS AS NEEDED
Status: DISCONTINUED | OUTPATIENT
Start: 2024-02-06 | End: 2024-02-06 | Stop reason: HOSPADM

## 2024-02-06 RX ORDER — TRANEXAMIC ACID 10 MG/ML
1000 INJECTION, SOLUTION INTRAVENOUS ONCE
Status: DISCONTINUED | OUTPATIENT
Start: 2024-02-06 | End: 2024-02-06 | Stop reason: HOSPADM

## 2024-02-06 RX ORDER — FENTANYL CITRATE 50 UG/ML
50 INJECTION, SOLUTION INTRAMUSCULAR; INTRAVENOUS
Status: DISCONTINUED | OUTPATIENT
Start: 2024-02-06 | End: 2024-02-06 | Stop reason: HOSPADM

## 2024-02-06 RX ORDER — ASPIRIN 81 MG/1
81 TABLET ORAL EVERY 12 HOURS SCHEDULED
Status: DISCONTINUED | OUTPATIENT
Start: 2024-02-07 | End: 2024-02-06 | Stop reason: HOSPADM

## 2024-02-06 RX ORDER — ROPIVACAINE HYDROCHLORIDE 5 MG/ML
INJECTION, SOLUTION EPIDURAL; INFILTRATION; PERINEURAL AS NEEDED
Status: DISCONTINUED | OUTPATIENT
Start: 2024-02-06 | End: 2024-02-06 | Stop reason: HOSPADM

## 2024-02-06 RX ORDER — ACETAMINOPHEN 500 MG
1000 TABLET ORAL ONCE
Status: COMPLETED | OUTPATIENT
Start: 2024-02-06 | End: 2024-02-06

## 2024-02-06 RX ORDER — HYDROCODONE BITARTRATE AND ACETAMINOPHEN 5; 325 MG/1; MG/1
1 TABLET ORAL ONCE
Status: COMPLETED | OUTPATIENT
Start: 2024-02-06 | End: 2024-02-06

## 2024-02-06 RX ORDER — ONDANSETRON 2 MG/ML
INJECTION INTRAMUSCULAR; INTRAVENOUS AS NEEDED
Status: DISCONTINUED | OUTPATIENT
Start: 2024-02-06 | End: 2024-02-06 | Stop reason: SURG

## 2024-02-06 RX ORDER — ROPIVACAINE HYDROCHLORIDE 2 MG/ML
INJECTION, SOLUTION EPIDURAL; INFILTRATION; PERINEURAL CONTINUOUS
Status: DISCONTINUED | OUTPATIENT
Start: 2024-02-06 | End: 2024-02-06 | Stop reason: HOSPADM

## 2024-02-06 RX ORDER — FAMOTIDINE 10 MG/ML
20 INJECTION, SOLUTION INTRAVENOUS ONCE
Status: DISCONTINUED | OUTPATIENT
Start: 2024-02-06 | End: 2024-02-06

## 2024-02-06 RX ORDER — SODIUM CHLORIDE 9 MG/ML
120 INJECTION, SOLUTION INTRAVENOUS CONTINUOUS
Status: DISCONTINUED | OUTPATIENT
Start: 2024-02-06 | End: 2024-02-06 | Stop reason: HOSPADM

## 2024-02-06 RX ORDER — TRANEXAMIC ACID 10 MG/ML
1000 INJECTION, SOLUTION INTRAVENOUS ONCE
Status: COMPLETED | OUTPATIENT
Start: 2024-02-06 | End: 2024-02-06

## 2024-02-06 RX ORDER — LIDOCAINE HYDROCHLORIDE 10 MG/ML
INJECTION, SOLUTION EPIDURAL; INFILTRATION; INTRACAUDAL; PERINEURAL AS NEEDED
Status: DISCONTINUED | OUTPATIENT
Start: 2024-02-06 | End: 2024-02-06 | Stop reason: SURG

## 2024-02-06 RX ORDER — PROPOFOL 10 MG/ML
VIAL (ML) INTRAVENOUS AS NEEDED
Status: DISCONTINUED | OUTPATIENT
Start: 2024-02-06 | End: 2024-02-06 | Stop reason: SURG

## 2024-02-06 RX ORDER — MELOXICAM 15 MG/1
15 TABLET ORAL DAILY
Status: DISCONTINUED | OUTPATIENT
Start: 2024-02-07 | End: 2024-02-06 | Stop reason: HOSPADM

## 2024-02-06 RX ORDER — ACETAMINOPHEN 500 MG
1000 TABLET ORAL EVERY 8 HOURS
Qty: 60 TABLET | Refills: 0 | Status: SHIPPED | OUTPATIENT
Start: 2024-02-06 | End: 2024-02-16

## 2024-02-06 RX ORDER — MELOXICAM 15 MG/1
15 TABLET ORAL DAILY
Qty: 15 TABLET | Refills: 0 | Status: SHIPPED | OUTPATIENT
Start: 2024-02-06 | End: 2024-02-21

## 2024-02-06 RX ORDER — LIDOCAINE HYDROCHLORIDE 10 MG/ML
0.5 INJECTION, SOLUTION EPIDURAL; INFILTRATION; INTRACAUDAL; PERINEURAL ONCE AS NEEDED
Status: COMPLETED | OUTPATIENT
Start: 2024-02-06 | End: 2024-02-06

## 2024-02-06 RX ORDER — ONDANSETRON 2 MG/ML
4 INJECTION INTRAMUSCULAR; INTRAVENOUS EVERY 6 HOURS PRN
Status: DISCONTINUED | OUTPATIENT
Start: 2024-02-06 | End: 2024-02-06 | Stop reason: HOSPADM

## 2024-02-06 RX ORDER — DOCUSATE SODIUM 100 MG/1
100 CAPSULE, LIQUID FILLED ORAL 2 TIMES DAILY
Qty: 30 CAPSULE | Refills: 0 | Status: SHIPPED | OUTPATIENT
Start: 2024-02-06 | End: 2024-02-21

## 2024-02-06 RX ORDER — ONDANSETRON 4 MG/1
4 TABLET, ORALLY DISINTEGRATING ORAL EVERY 6 HOURS PRN
Status: DISCONTINUED | OUTPATIENT
Start: 2024-02-06 | End: 2024-02-06 | Stop reason: HOSPADM

## 2024-02-06 RX ORDER — ACETAMINOPHEN 500 MG
1000 TABLET ORAL EVERY 6 HOURS
Status: DISCONTINUED | OUTPATIENT
Start: 2024-02-06 | End: 2024-02-06 | Stop reason: HOSPADM

## 2024-02-06 RX ORDER — BUPIVACAINE HYDROCHLORIDE 2.5 MG/ML
INJECTION, SOLUTION EPIDURAL; INFILTRATION; INTRACAUDAL
Status: DISCONTINUED | OUTPATIENT
Start: 2024-02-06 | End: 2024-02-06 | Stop reason: SURG

## 2024-02-06 RX ORDER — ASPIRIN 81 MG/1
81 TABLET, CHEWABLE ORAL DAILY
Start: 2024-03-08

## 2024-02-06 RX ADMIN — ACETAMINOPHEN 1000 MG: 500 TABLET ORAL at 12:12

## 2024-02-06 RX ADMIN — TRANEXAMIC ACID 1000 MG: 10 INJECTION, SOLUTION INTRAVENOUS at 09:05

## 2024-02-06 RX ADMIN — Medication 200 MCG: at 08:41

## 2024-02-06 RX ADMIN — OXYCODONE HYDROCHLORIDE 5 MG: 5 TABLET ORAL at 12:13

## 2024-02-06 RX ADMIN — SODIUM CHLORIDE 120 ML/HR: 9 INJECTION, SOLUTION INTRAVENOUS at 12:06

## 2024-02-06 RX ADMIN — PREGABALIN 150 MG: 150 CAPSULE ORAL at 07:13

## 2024-02-06 RX ADMIN — Medication 10 ML: at 12:06

## 2024-02-06 RX ADMIN — SODIUM CHLORIDE 2 G: 900 INJECTION INTRAVENOUS at 15:28

## 2024-02-06 RX ADMIN — PROPOFOL 50 MG: 10 INJECTION, EMULSION INTRAVENOUS at 07:45

## 2024-02-06 RX ADMIN — Medication 200 MCG: at 08:33

## 2024-02-06 RX ADMIN — OXYCODONE HYDROCHLORIDE 5 MG: 5 TABLET ORAL at 14:36

## 2024-02-06 RX ADMIN — BUPIVACAINE HYDROCHLORIDE 20 ML: 2.5 INJECTION, SOLUTION EPIDURAL; INFILTRATION; INTRACAUDAL; PERINEURAL at 09:50

## 2024-02-06 RX ADMIN — FAMOTIDINE 20 MG: 20 TABLET, FILM COATED ORAL at 07:13

## 2024-02-06 RX ADMIN — HYDROCODONE BITARTRATE AND ACETAMINOPHEN 1 TABLET: 5; 325 TABLET ORAL at 10:05

## 2024-02-06 RX ADMIN — Medication 1000 MG: at 10:06

## 2024-02-06 RX ADMIN — Medication 200 MCG: at 09:13

## 2024-02-06 RX ADMIN — SODIUM CHLORIDE, POTASSIUM CHLORIDE, SODIUM LACTATE AND CALCIUM CHLORIDE 9 ML/HR: 600; 310; 30; 20 INJECTION, SOLUTION INTRAVENOUS at 07:00

## 2024-02-06 RX ADMIN — MELOXICAM 15 MG: 15 TABLET ORAL at 07:13

## 2024-02-06 RX ADMIN — LIDOCAINE HYDROCHLORIDE 0.5 ML: 10 INJECTION, SOLUTION EPIDURAL; INFILTRATION; INTRACAUDAL; PERINEURAL at 07:00

## 2024-02-06 RX ADMIN — SODIUM CHLORIDE 2 G: 900 INJECTION INTRAVENOUS at 07:55

## 2024-02-06 RX ADMIN — DEXAMETHASONE SODIUM PHOSPHATE 8 MG: 4 INJECTION, SOLUTION INTRAMUSCULAR; INTRAVENOUS at 07:53

## 2024-02-06 RX ADMIN — TRANEXAMIC ACID 1000 MG: 10 INJECTION, SOLUTION INTRAVENOUS at 07:57

## 2024-02-06 RX ADMIN — LIDOCAINE HYDROCHLORIDE 50 MG: 10 INJECTION, SOLUTION EPIDURAL; INFILTRATION; INTRACAUDAL; PERINEURAL at 07:45

## 2024-02-06 RX ADMIN — Medication 200 MCG: at 08:09

## 2024-02-06 RX ADMIN — BUPIVACAINE HYDROCHLORIDE 2 ML: 5 INJECTION, SOLUTION PERINEURAL at 07:55

## 2024-02-06 RX ADMIN — ACETAMINOPHEN 1000 MG: 500 TABLET ORAL at 07:12

## 2024-02-06 RX ADMIN — PROPOFOL 100 MCG/KG/MIN: 10 INJECTION, EMULSION INTRAVENOUS at 07:53

## 2024-02-06 RX ADMIN — ONDANSETRON 4 MG: 2 INJECTION INTRAMUSCULAR; INTRAVENOUS at 07:53

## 2024-02-06 NOTE — OP NOTE
DATE OF PROCEDURE: 02/06/24    PREOPERATIVE DIAGNOSIS: right knee arthritis      POSTOPERATIVE DIAGNOSIS:  right knee arthritis    PROCEDURES PERFORMED:   right total knee arthroplasty with [employer] components (# 3 narrow cruciate retaining femur, # 1 tibia, 10 mm polyethylene, with 29 three peg patella) with CORI robotic assistance    Surgical Approach: Knee Medial Parapatellar     SURGEON: Irvin Del Cid MD    ASSISTANT: Funmilayo Lara PA-C  (Funmilayo Lara PA-C was present and necessary for positioning, draping, retraction, instrumentation and closure.)    SPECIMENS: None    IMPLANTS:   Implant Name Type Inv. Item Serial No.  Lot No. LRB No. Used Action   CMT BONE PALACOS R HI/VISC 1X40 - [id number] Implant CMT BONE PALACOS R HI/VISC 1X40  [facility] [id number] Right 1 Implanted   CMT BONE PALACOS R HI/VISC 1X40 - [id number] Implant CMT BONE PALACOS R HI/VISC 1X40  [facility] [id number] Right 1 Implanted   DEV CONTRL TISS STRATAFIX SPIRAL MNCRYL UD 3/0 PLS 60CM - [id number] Implant DEV CONTRL TISS STRATAFIX SPIRAL MNCRYL UD 3/0 PLS 60CM  [employer]  DIV [employer] J [employer] J  Right 1 Implanted   DEV CONTRL TISS STRATAFIX SYMM PDS PLUS STEF CT-1 45CM - [id number] Implant DEV CONTRL TISS STRATAFIX SYMM PDS PLUS STEF CT-1 45CM  [employer]  DIV [employer] J [employer] J  Right 1 Implanted   PAT RESRF GEN2 7.5X29MM - [id number] Implant PAT RESRF GEN2 7.5X29MM  [employer] [id number] Right 1 Implanted   BASE TIB/KN GEN2 NONPOR TI SZ1 RT - [id number] Implant BASE TIB/KN GEN2 NONPOR TI SZ1 RT  [employer] [id number] Right 1 Implanted   COMP FEM LEGION OXINIUM CR NRW SZ3 RT - [id number] Implant COMP FEM LEGION OXINIUM CR NRW SZ3 RT  [employer] [id number] Right 1 Implanted   INSRT ART/KN LEGION CR HF XLPE SZ1TO2 10MM - [id number] Implant INSRT ART/KN LEGION CR HF XLPE SZ1TO2 10MM  [employer] [id number] Right 1 Implanted         ANESTHESIA:  Spinal    STAFF:  Circulator: Jennifer Borrego,  RN; Diane Rivera RN  Scrub Person: Doug Harris RN; Efren Leong  Vendor Representative: Sarbjit Gonzalez ([employer])  Nursing Assistant: Aubrey Guerra  Assistant: Funmilayo Lara PA-C    TOURNIQUET TIME: 12 minutes    ESTIMATED BLOOD LOSS: 100 cc    COMPLICATIONS: None    PREOPERATIVE ANTIBIOTICS: Ancef 2 g    INDICATIONS: The patient is a 68 y.o. female with debilitating right knee pain secondary to osteoarthritis that failed to improve in spite of conservative treatment .  Options have been discussed at length with the patient and the patient has had an extended course of conservative treatment without long-term benefit. The patient has reached the point where the patient desires total knee arthroplasty surgery and understands the risks, benefits, and alternatives. Consent was obtained. Please see my office notes for details with regard to preoperative counseling and operative rationale.     DESCRIPTION OF PROCEDURE: The patient was positively identified in the preoperative holding area and brought to the operating suite and placed in a supine position. After adequate spinal anesthetic had been achieved, the right lower extremity was prepped and draped in the usual sterile fashion.  After application of a tourniquet to the right upper thigh, which was used during the procedure for a total 12 minutes during the cementation process only. Landmarks of the knee were identified and timeout procedure was performed to confirm the operative site, as well as other parameters. Following the sterile prep and drape, a skin incision was made just off the medial aspect of midline for a medial parapatellar approach. Following a sharp skin incision, dissection was carried down to the level of the extensor mechanism and a medial parapatellar arthrotomy was made and the patella was tucked into the lateral gutter.  Anterior horns of the medial and lateral menisci were removed, and ACL transected.  Osteophytes  were also removed.  Description of arthritis: Bone-on-bone contact medial compartment, tricompartmental osteophytes, varus alignment.      [employer] robotic system was then employed to assist with preparation of the femoral and tibial bone surfaces.  Femoral and tibial arrays were placed, as well as markers in both the femur and tibia.  System was registered in a systematic fashion, and 3D models created of both the femoral and tibial aspects.  Range of motion and gap assessments were also performed, and implants were selected and appropriately sized and oriented both the femur and tibial aspects starting with the flexion gap, and then progressing to the extension gap.  Planning was made for a 3 cruciate retaining femoral component and a 2 tibial component with a 9 mm insert.  Once balancing had been achieved, distal femoral surface was then prepared with the CORI lou, followed by preparation for the 4-in-1 cutting block.  Proximal tibial cut was then performed in a guided fashion, posterior condyles were freed of osteophytes, and trial implants placed, namely a 3 cruciate retaining femur with a 1 tibia and a 10 mm trial insert.  Range of motion and gap assessments were again performed, and excellent balancing was noted.  The patella was then prepared for a 29 three peg patella which had excellent tracking.      Therefore the trial components were removed, and preparations made for final placement, and final components were cemented in place with namely a # 1 tibia, # 2 cruciate retaining femur, and a 29 three peg patella with a trial 10 mm insert for cement compression. All the excess cement was removed from the bone implant interface and allowed to harden. Tourniquet was deflated. Hemostasis was obtained with electrocautery. There was no brisk bleeding noted in the popliteal fossa in particular. Therefore, the knee was copiously irrigated as it was between major steps, and the final 10 mm insert was placed as this was  deemed appropriate for the patient's anatomy with full flexion and extension and no instability and attention was then directed towards closure. The medial parapatellar arthrotomy was closed with #1 Vicryl in an interrupted figure-of-eight fashion in 4 strategic locations followed by oversewing this from proximal to distal with a #1 StrataFix symmetric, which nicely sealed the joint, followed by closure of the deep fascial layer with #1 Vicryl in a buried interrupted fashion, followed by closure of the subcutaneous layer with 2-0 Vicryl and the skin with 3-0 Stratafix in a running subcuticular fashion.  Adhesive wound closure dressing was applied followed by a sterile dressing with 4 x 4's, abdominal pad, soft roll and Ace wrap. The patient tolerated the procedure well and was brought to the recovery room in good condition.     PLAN:  1.  The patient will begin early range of motion and weight-bearing per the post total knee arthroplasty protocol.   2.  I anticipate brief hospitalization for initial rehabilitation and pain control followed by continued rehabilitation home health/outpatient physical therapy setting.  Patient likely ready for discharge later today as long as she is cleared medically and by physical therapy.  Follow-up in 3 weeks as planned.   3.  Postoperative medical management with Dr. Conley.  4.  Aspirin will be utilized for DVT prophylaxis.       Irvin Del Cid MD  02/06/24  09:29 EST

## 2024-02-06 NOTE — PLAN OF CARE
Goal Outcome Evaluation:  Plan of Care Reviewed With: patient        Progress: no change  Outcome Evaluation: Pt amb 200' with FWW and CGAx2. Pt navigated a step without difficulty and good sequencing recall. No knee buckling or LOB noted. Activity limited by elevated pain. RN notified. HEP and precautions reviewed with pt. R knee ROM 10-92. Frequent ambulation at home encouraged. Pt verbalized understanding. Car transfer reviewed with pt. Recommend d/c home with assist and OPPT when medically appropriate. Pt cleared to d/c today from PT standpoint. She will need FWW prior to d/c. ADLs assessed, no needs identified for OT consult at this time.      Anticipated Discharge Disposition (PT): home with assist, home with outpatient therapy services

## 2024-02-06 NOTE — ANESTHESIA PROCEDURE NOTES
Spinal Block      Patient reassessed immediately prior to procedure    Patient location during procedure: OR  Indication:at surgeon's request  Performed By  CRNA/CAA: Jan Patricio CRNA  Preanesthetic Checklist  Completed: patient identified, IV checked, site marked, risks and benefits discussed, surgical consent, monitors and equipment checked, pre-op evaluation and timeout performed  Spinal Block Prep:  Patient Position:sitting  Sterile Tech:cap, gloves, sterile barriers and mask  Prep:Chloraprep  Patient Monitoring:blood pressure monitoring, continuous pulse oximetry and EKG    Spinal Block Procedure  Approach:midline  Guidance:landmark technique and palpation technique  Location:L4-L5  Needle Type:Zohra  Needle Gauge:25 G  Placement of Spinal needle event:cerebrospinal fluid aspirated  Paresthesia: no  Fluid Appearance:clear  Medications: bupivacaine (MARCAINE) 0.5 % injection - Injection   2 mL - 2/6/2024 7:55:00 AM   Post Assessment  Patient Tolerance:patient tolerated the procedure well with no apparent complications  Complications no  Additional Notes  Procedure:  Pt assisted to sitting position, with legs in position of comfort over side of bed.  Pt. instructed in optimal spine presentation, the spine was prepped/ Draped and the skin at insertion site was anesthetized with 1% Lidocaine 2 ml.  The spinal needle was then advanced until CSF flow was obtained and LA was injected:

## 2024-02-06 NOTE — H&P
Patient Name: Funmilayo Cantu  MRN: [id number]  : 1955  DOS: 2024    Attending: Irvin Del Cid MD    Primary Care Provider: Xin Cantu PA      Chief complaint: Right knee Pain.    Subjective   Patient is a pleasant 68 y.o. female presented for scheduled surgery by Dr. Del Cid.    Per his note (The patient is a 68 y.o. female with debilitating right knee pain secondary to osteoarthritis that failed to improve in spite of conservative treatment .  Options have been discussed at length with the patient and the patient has had an extended course of conservative treatment without long-term benefit. The patient has reached the point where the patient desires total knee arthroplasty surgery and understands the risks, benefits, and alternatives. Consent was obtained. Please see my office notes for details with regard to preoperative counseling and operative rationale.).    She underwent right total knee arthroplasty under spinal anesthesia, tolerated surgery well.  Adductor canal nerve block cath was placed by acute pain service.    I saw her in her room where she is doing well, good pain control, no complains of nausea, vomiting, or shortness of breath.    She has no history of DVT or PE.  Reviewed with patient her past medical history and home medications.    Allergies:  No Known Allergies    Meds:  Medications Prior to Admission   Medication Sig Dispense Refill Last Dose    amLODIPine (NORVASC) 5 MG tablet Take 1 tablet by mouth Daily.   2024    aspirin 81 MG chewable tablet Chew 1 tablet Daily.   2024    atorvastatin (LIPITOR) 10 MG tablet Take 1 tablet by mouth every night at bedtime.   2024    Chlorhexidine Gluconate 4 % solution Shower with solution as directed for 5 days prior to surgery 237 mL 0 2024    cholecalciferol (VITAMIN D3) 1000 units tablet Take 1 tablet by mouth Daily.   2024    famotidine (PEPCID) 40 MG tablet    2024    losartan (COZAAR) 50 MG tablet Take 1  tablet by mouth Daily.   2024    Restasis 0.05 % ophthalmic emulsion    2024 at 0400       Past Medical History:   Diagnosis Date    Fracture of wrist     GERD (gastroesophageal reflux disease)     Hip arthrosis 2021    Hyperlipidemia     Hypertension     Kidney stones     HX OF    Knee swelling     Low back strain 1995    Mild obesity 2017    Neck strain ?    Nephrolithiasis     Osteoarthritis 2017    Osteopenia 2017    Premature ventricular contractions 2017    with appropriate suppression on stress echocardiography.    Tendinitis of knee ?    Maybe     Past Surgical History:   Procedure Laterality Date    CATARACT EXTRACTION Bilateral     CHOLECYSTECTOMY      COLONOSCOPY      DENTAL PROCEDURE      [place] teeth extraction.    FOOT SURGERY      Remove ingrown toenail    KIDNEY STONE SURGERY      Lithotripsy x2.    KNEE ARTHROSCOPY Right     TONSILLECTOMY       Family History   Problem Relation Age of Onset    Ovarian cancer Sister 49    Cancer Sister         Ovarian    Breast cancer Other 30    Heart disease Mother     Cancer Mother         Non-Hodgkin’s Lymphoma    Heart attack Father 70    Hyperlipidemia Brother     Diabetes Brother     Heart attack Maternal Grandmother     Heart attack Maternal Grandfather     Cancer Paternal Grandfather         Mouth    Heart disease Paternal Grandmother      Social History     Tobacco Use    Smoking status: Former     Packs/day: 0.50     Years: 5.00     Additional pack years: 0.00     Total pack years: 2.50     Types: Cigarettes     Start date: 9/10/1969     Quit date: 1976     Years since quittin.7    Smokeless tobacco: Never    Tobacco comments:     High school + one year college   Vaping Use    Vaping Use: Never used   Substance Use Topics    Alcohol use: Yes     Alcohol/week: 2.0 standard drinks of alcohol     Types: 2 Drinks containing 0.5 oz of alcohol per week     Comment: Ulcer. 2 COCKTAILS IN THE EVENING     "Drug use: No    .    Review of Systems  Pertinent items are noted in HPI    Vital Signs  /74 (BP Location: Right arm, Patient Position: Lying)   Pulse 61   Temp 97.5 °F (36.4 °C) (Oral)   Resp 14   Ht 154.9 cm (61\")   Wt 78 kg (172 lb)   LMP  (LMP Unknown)   SpO2 94%   BMI 32.50 kg/m²     Physical Exam:    General Appearance:    Alert, cooperative, in no acute distress   Head:    Normocephalic, without obvious abnormality, atraumatic   Eyes:            Lids and lashes normal, conjunctivae and sclerae normal, no   icterus, no pallor, corneas clear    Ears:    Ears appear intact with no abnormalities noted   Throat:   No oral lesions, no thrush, oral mucosa moist   Neck:   No adenopathy, supple, trachea midline, no thyromegaly         Lungs:     Clear to auscultation,respirations regular, even and                   unlabored    Heart:    Regular rhythm and normal rate, normal S1 and S2, no       murmur, no gallop   Abdomen:     Normal bowel sounds, no masses, no organomegaly, soft        non-tender, non-distended, no guarding, no rebound                 tenderness   Genitalia:    Deferred   Extremities: Right LE, CDI dressing on knee, PNB cath present.    Pulses:   Pulses palpable and equal bilaterally   Skin:   No bleeding, bruising or rash   Neurologic:   Cranial nerves 2 - 12 grossly intact, intact flexion and dorsiflexion bilateral feet      I reviewed the patient's new clinical results.             Invalid input(s): \"NEUTOPHILPCT\"        Invalid input(s): \"LABALBU\", \"PROT\"  Lab Results   Component Value Date    HGBA1C 6.00 (H) 01/23/2024      Latest Reference Range & Units 01/23/24 08:43   Sodium 136 - 145 mmol/L 142   Potassium 3.5 - 5.2 mmol/L 3.9   Chloride 98 - 107 mmol/L 107   CO2 22.0 - 29.0 mmol/L 23.0   Anion Gap 5.0 - 15.0 mmol/L 12.0   BUN 8 - 23 mg/dL 17   Creatinine 0.57 - 1.00 mg/dL 1.01 (H)   BUN/Creatinine Ratio 7.0 - 25.0  16.8   eGFR >60.0 mL/min/1.73 60.8   Glucose 65 - 99 " mg/dL 146 (H)   Calcium 8.6 - 10.5 mg/dL 9.1   (H): Data is abnormally high     Latest Reference Range & Units 01/23/24 08:43   WBC 3.40 - 10.80 10*3/mm3 7.05   RBC 3.77 - 5.28 10*6/mm3 4.19   Hemoglobin 12.0 - 15.9 g/dL 14.2   Hematocrit 34.0 - 46.6 % 43.6   Platelets 140 - 450 10*3/mm3 250   RDW 12.3 - 15.4 % 13.0   MCV 79.0 - 97.0 fL 104.1 (H)   MCH 26.6 - 33.0 pg 33.9 (H)   MCHC 31.5 - 35.7 g/dL 32.6   MPV 6.0 - 12.0 fL 8.9   RDW-SD 37.0 - 54.0 fl 50.0   (H): Data is abnormally high  Assessment and Plan:       S/P total knee arthroplasty, right    Essential hypertension    Hyperlipidemia    Mild obesity    Osteoarthritis    Degenerative arthritis of right knee    GERD without esophagitis  Elevated hemoglobin A1c    Plan  1. PT/OT,  Weight bearing as tolerated right LE  2. Pain control-prns, ACB cath with ropivacaine infusion.  3. IS-encourage  4. DVT proph- Mechanicals and aspirin  5. Bowel regimen  6. Resume home medications as appropriate  7.  DC planning for home.    Patient is very motivated to work with physical therapy and achieve  mobility and pain control among other goals for possible discharge home later in the day.    We reviewed these goals and discussed with patient tracking  progress for the next few hours and if all is achieved to receive next antibiotic prophylactic dose and be discharged home.     We discussed medications and precriptions at time of discharge including DVT prophylaxis, pain control, and bowel regimen.  All questions were answered .    Patient expressed understanding and agreement.wy.        Dragon disclaimer:  Part of this encounter note is an electronic transcription/translation of spoken language to printed text. The electronic translation of spoken language may permit erroneous, or at times, nonsensical words or phrases to be inadvertently transcribed; Although I have reviewed the note for such errors, some may still exist.    Brayan Conley MD  02/06/24  11:31  EST

## 2024-02-06 NOTE — ANESTHESIA PROCEDURE NOTES
Peripheral Block    Pre-sedation assessment completed: 2/6/2024 9:50 AM    Patient reassessed immediately prior to procedure    Start time: 2/6/2024 9:50 AM  Reason for block: at surgeon's request and post-op pain management  Performed by  CRNA/CAA: Roger Hobbs, CRNA  Assisted by: Abigail Morales RN  Preanesthetic Checklist  Completed: patient identified, IV checked, site marked, risks and benefits discussed, surgical consent, monitors and equipment checked, pre-op evaluation and timeout performed  Prep:  Pt Position: supine  Sterile barriers:cap, gloves, mask, sterile barriers and washed/disinfected hands  Prep: ChloraPrep  Patient monitoring: blood pressure monitoring, continuous pulse oximetry and EKG  Procedure  Performed under: spinal  Guidance:ultrasound guided    ULTRASOUND INTERPRETATION.  Using ultrasound guidance a 20 G gauge needle was placed in close proximity to the nerve, at which point, under ultrasound guidance anesthetic was injected in the area of the nerve and spread of the anesthesia was seen on ultrasound in close proximity thereto.  There were no abnormalities seen on ultrasound; a digital image was taken; and the patient tolerated the procedure with no complications. Images:still images obtained, printed/placed on chart    Laterality:right  Block Type:adductor canal block  Injection Technique:catheter  Needle Type:Tuohy and echogenic  Needle Gauge:18 G  Resistance on Injection: none  Catheter Size:20 G (20g)  Cath Depth at skin: 9 cm    Medications Used: bupivacaine PF (MARCAINE) 0.25 % injection - Injection   20 mL - 2/6/2024 9:50:00 AM      Medications  Preservative Free Saline:10ml    Post Assessment  Injection Assessment: negative aspiration for heme, incremental injection and no paresthesia on injection  Patient Tolerance:comfortable throughout block  Complications:no  Additional Notes  CATHETER   A high-frequency linear transducer, with sterile cover, was placed on the anterior  "mid-thigh (between the anterior superior iliac spine and patella). The transducer was then moved medially to identify the Sartorius muscle (Phil), Vastus Medialis muscle (VMM), Superficial Femoral Artery (SFA) and Vein. The transducer was then moved cephalad or caudad to position the SFA in the middle of the Phil. The insertion site was prepped and draped in sterile fashion. Skin and cutaneous tissue was infiltrated with 2-5 ml of 1% Lidocaine. Using ultrasound-guidance, an 18-gauge InflowControliplex Ultra 360 Touhy needle was advanced in plane from lateral to medial. Preservative-free normal saline was utilized for hydro-dissection of tissue, advancement of Touhy, and to confirm needle placement below the fascial plane of the Phil where the Nerve to the VMM is located. Local anesthetic (LA) 5 ml deposited here. The Touhy needle continues its path lateral to the SFA at the level of the Saphenous Nerve. The remainder of the LA was deposited at the 10-11 o'clock position of the SFA. This injection created a space between the Phil and the SFA. Aspiration every 5 ml to prevent intravascular injection. Injection was completed with negative aspiration of blood and negative intravascular injection. Injection pressures were normal with minimal resistance. A 20-gauge InflowControliplex Echo catheter was placed through the needle and advance out the tip of the Touhy 3-5 cm anterior to the SFA. The Touhy needle was then removed, and final catheter position verified at the 12 o'clock position to the SFA. The catheter was secured in the usual fashion with skin glue, benzoin, steri-strips, CHG tegaderm and label noting \"Nerve Block Catheter\". Jerk tape applied at yellow connector and catheter connection.           "

## 2024-02-06 NOTE — ANESTHESIA PREPROCEDURE EVALUATION
Anesthesia Evaluation     Patient summary reviewed and Nursing notes reviewed   no history of anesthetic complications:   NPO Solid Status: > 8 hours  NPO Liquid Status: > 8 hours           Airway   Mallampati: II  TM distance: >3 FB  Neck ROM: full  No difficulty expected  Dental      Pulmonary - negative pulmonary ROS and normal exam   Cardiovascular - normal exam    (+) hypertension, hyperlipidemia      Neuro/Psych- negative ROS  GI/Hepatic/Renal/Endo    (+) obesity, morbid obesity, GERD, renal disease-    Musculoskeletal     Abdominal    Substance History      OB/GYN          Other   arthritis,                 Anesthesia Plan    ASA 3     spinal     intravenous induction     Anesthetic plan, risks, benefits, and alternatives have been provided, discussed and informed consent has been obtained with: patient.    Plan discussed with CRNA.    CODE STATUS:

## 2024-02-06 NOTE — ANESTHESIA POSTPROCEDURE EVALUATION
"Patient: Funmilayo Cantu    Procedure Summary       Date: 02/06/24 Room / Location:  [facility]    Anesthesia Start: 0741 Anesthesia Stop: 0948    Procedure: TOTAL KNEE ARTHROPLASTY WITH CORI ROBOT - RIGHT (Right: Knee) Diagnosis:       Primary osteoarthritis of right knee      (Primary osteoarthritis of right knee [M17.11])    Surgeons: Irvin Del Cid MD Provider: Rupert Wheeler MD    Anesthesia Type: spinal ASA Status: 3            Anesthesia Type: spinal    Vitals  No vitals data found for the desired time range.          Post Anesthesia Care and Evaluation    Patient location during evaluation: PACU  Patient participation: complete - patient participated  Level of consciousness: awake and responsive to verbal stimuli  Pain score: 2  Pain management: adequate    Airway patency: patent  Anesthetic complications: No anesthetic complications    Cardiovascular status: acceptable  Respiratory status: acceptable  Hydration status: acceptable  Post Neuraxial Block status: No signs or symptoms of PDPH  Comments: Pt awake and responsive. SV. VSS. Report to RN. Patient Vitals in the past 24 hrs:  02/06/24 0708, BP:142/79, Temp:97.7 °F (36.5 °C), Temp src:Temporal, Pulse:81, Resp:18, SpO2:95 %, Height:154.9 cm (61\"), Weight:78 kg (172 lb)  133/78. p 72. r 16. t 98.1                "

## 2024-02-06 NOTE — THERAPY EVALUATION
Patient Name: Funmilayo Cantu  : 1955    MRN: [id number]                              Today's Date: 2024       Admit Date: 2024    Visit Dx:     ICD-10-CM ICD-9-CM   1. S/P total knee arthroplasty, right  Z96.651 V43.65   2. Primary osteoarthritis of right knee  M17.11 715.16     Patient Active Problem List   Diagnosis    Precordial pain    Essential hypertension    Fatigue    Hyperlipidemia    Mild obesity    Osteoarthritis    Osteopenia    Premature ventricular contractions    Degenerative arthritis of right knee    GERD without esophagitis    S/P total knee arthroplasty, right     Past Medical History:   Diagnosis Date    Fracture of wrist     GERD (gastroesophageal reflux disease)     Hip arthrosis 2021    Hyperlipidemia     Hypertension     Kidney stones     HX OF    Knee swelling     Low back strain     Mild obesity 2017    Neck strain ?    Nephrolithiasis     Osteoarthritis 2017    Osteopenia 2017    Premature ventricular contractions 2017    with appropriate suppression on stress echocardiography.    Tendinitis of knee ?    Maybe     Past Surgical History:   Procedure Laterality Date    CATARACT EXTRACTION Bilateral     CHOLECYSTECTOMY      COLONOSCOPY      DENTAL PROCEDURE      [place] teeth extraction.    FOOT SURGERY      Remove ingrown toenail    KIDNEY STONE SURGERY      Lithotripsy x2.    KNEE ARTHROSCOPY Right     TONSILLECTOMY        General Information       Row Name 24 1341          Physical Therapy Time and Intention    Document Type evaluation  -     Mode of Treatment physical therapy  -       Row Name 24 1341          General Information    Patient Profile Reviewed yes  -HW     Prior Level of Function min assist:;all household mobility;community mobility;gait;transfer;bed mobility;ADL's  Denied AD use prior to surgery. No recent falls  -HW     Existing Precautions/Restrictions fall;other (see comments)  adductor canal  nerve cath  -[facility] Name 02/06/24 1341          Living Environment    People in Home spouse  -       Row Name 02/06/24 1341          Home Main Entrance    Number of Stairs, Main Entrance two;other (see comments)  1+1  -     Stair Railings, Main Entrance none  -       Row Name 02/06/24 1341          Stairs Within Home, Primary    Number of Stairs, Within Home, Primary none  -[facility] Name 02/06/24 1341          Cognition    Orientation Status (Cognition) oriented x 3  -[facility] Name 02/06/24 1341          Safety Issues, Functional Mobility    Safety Issues Affecting Function (Mobility) awareness of need for assistance;insight into deficits/self-awareness  -     Impairments Affecting Function (Mobility) balance;endurance/activity tolerance;pain;range of motion (ROM);strength  -               User Key  (r) = Recorded By, (t) = Taken By, (c) = Cosigned By      Initials Name Provider Type     Elida Nuñez PT Physical Therapist                   Mobility       [facility] Name 02/06/24 1340          Bed Mobility    Bed Mobility supine-sit  -     Supine-Sit [place] (Bed Mobility) standby assist  -     Comment, (Bed Mobility) VC for line management  -[facility] Name 02/06/24 1340          Transfers    Comment, (Transfers) VC for hand placement and line management. Good recall throughout session.  -[facility] Name 02/06/24 1340          Sit-Stand Transfer    Sit-Stand [place] (Transfers) contact guard;verbal cues;nonverbal cues (demo/gesture);2 person assist  -     Assistive Device (Sit-Stand Transfers) walker, front-wheeled  -       Row Name 02/06/24 1340 02/06/24 1101       Gait/Stairs (Locomotion)    [place] Level (Gait) contact guard;2 person assist  - --    Assistive Device (Gait) walker, front-wheeled  - --    Patient was able to Ambulate yes  - no, other medical factors prevent ambulation  Continues to be under the effects of spinal anesthesia, with numbness/tingling  and impaired motor function in B LEs.  -LR    Reason Patient was unable to Ambulate -- Other (Comment)  -LR    Distance in Feet (Gait) 200  -HW --    Deviations/Abnormal Patterns (Gait) bilateral deviations;base of support, wide;weight shifting decreased;stride length decreased;gait speed decreased  -HW --    Bilateral Gait Deviations forward flexed posture;heel strike decreased  -HW --    [place] Level (Stairs) contact guard;nonverbal cues (demo/gesture);verbal cues;2 person assist  -HW --    Assistive Device (Stairs) walker, front-wheeled  -HW --    Number of Steps (Stairs) 1  -HW --    Ascending Technique (Stairs) step-to-step  -HW --    Descending Technique (Stairs) step-to-step  -HW --    Comment, (Gait/Stairs) Pt amb with step-through gait pattern. VC for upright posture, increased heel strike, R knee extension in stance phase, and staying closer to AD. Good improvement within session. Pt navigated a step with backwards technique and VC for sequencing. Great recall on sequencing within session. No knee buckling or LOB noted. Activity limited by fatigue and elevated pain to 7/10. RN notified.  - --      Row Name 02/06/24 1340          Mobility    Extremity Weight-bearing Status right lower extremity  -     Right Lower Extremity (Weight-bearing Status) weight-bearing as tolerated (WBAT)  -               User Key  (r) = Recorded By, (t) = Taken By, (c) = Cosigned By      Initials Name Provider Type    LR Oralia Carpenter, PT Physical Therapist     Elida Nuñez, ANOOP Physical Therapist                   Obj/Interventions       Row Name 02/06/24 1353          Range of Motion Comprehensive    General Range of Motion lower extremity range of motion deficits identified  -     Comment, General Range of Motion Surgical knee ROM: 10-92  -       Row Name 02/06/24 1353          Strength Comprehensive (MMT)    General Manual Muscle Testing (MMT) Assessment lower extremity strength deficits identified   -     Comment, General Manual Muscle Testing (MMT) Assessment Pt able to perform B active ankle DF and SLR  -       Row Name 02/06/24 1353          Motor Skills    Therapeutic Exercise knee;ankle  -       Row Name 02/06/24 1353          Knee (Therapeutic Exercise)    Knee (Therapeutic Exercise) isometric exercises;strengthening exercise  -     Knee Isometrics (Therapeutic Exercise) quad sets;right;10 repetitions  -     Knee Strengthening (Therapeutic Exercise) SLR (straight leg raise);SAQ (short arc quad);LAQ (long arc quad);heel slides;right;3 repetitions  -       Row Name 02/06/24 1353          Ankle (Therapeutic Exercise)    Ankle (Therapeutic Exercise) AROM (active range of motion)  -     Ankle AROM (Therapeutic Exercise) bilateral;dorsiflexion;plantarflexion;10 repetitions  -[facility] Name 02/06/24 1353          Balance    Balance Assessment sitting static balance;sitting dynamic balance;sit to stand dynamic balance;standing static balance;standing dynamic balance  -     Static Sitting Balance standby assist  -     Dynamic Sitting Balance standby assist  -     Position, Sitting Balance sitting edge of bed  -     Static Standing Balance contact guard  -     Dynamic Standing Balance contact guard  -     Position/Device Used, Standing Balance supported;walker, rolling  -     Balance Interventions sitting;standing;sit to stand;occupation based/functional task  -       Row Name 02/06/24 1359          Sensory Assessment (Somatosensory)    Sensory Assessment (Somatosensory) LE sensation intact  -               User Key  (r) = Recorded By, (t) = Taken By, (c) = Cosigned By      Initials Name Provider Type     Elida Nuñez, ANOOP Physical Therapist                   Goals/Plan       [facility] Name 02/06/24 0559          Bed Mobility Goal 1 (PT)    Activity/Assistive Device (Bed Mobility Goal 1, PT) sit to supine/supine to sit  -     [place] Level/Cues Needed (Bed Mobility Goal 1, PT)  modified independence  -HW     Time Frame (Bed Mobility Goal 1, PT) long term goal (LTG);3 days  -HW       Row Name 02/06/24 1356          Transfer Goal 1 (PT)    Activity/Assistive Device (Transfer Goal 1, PT) sit-to-stand/stand-to-sit  -HW     [place] Level/Cues Needed (Transfer Goal 1, PT) modified independence  -HW     Time Frame (Transfer Goal 1, PT) long term goal (LTG);3 days  -       Row Name 02/06/24 1356          Gait Training Goal 1 (PT)    Activity/Assistive Device (Gait Training Goal 1, PT) gait (walking locomotion)  -HW     [place] Level (Gait Training Goal 1, PT) modified independence  -HW     Distance (Gait Training Goal 1, PT) 500  -HW     Time Frame (Gait Training Goal 1, PT) long term goal (LTG);3 days  -       Row Name 02/06/24 1356          ROM Goal 1 (PT)    ROM Goal 1 (PT) Surgical Knee ROM: 0-100  -HW     Time Frame (ROM Goal 1, PT) long-term goal (LTG);3 days  -       Row Name 02/06/24 1356          Stairs Goal 1 (PT)    Activity/Assistive Device (Stairs Goal 1, PT) ascending stairs;descending stairs  -HW     [place] Level/Cues Needed (Stairs Goal 1, PT) modified independence  -HW     Number of Stairs (Stairs Goal 1, PT) 2  -HW     Time Frame (Stairs Goal 1, PT) long term goal (LTG);3 days  -       Row Name 02/06/24 1356          Therapy Assessment/Plan (PT)    Planned Therapy Interventions (PT) balance training;bed mobility training;gait training;home exercise program;ROM (range of motion);patient/family education;stair training;strengthening;stretching;transfer training  -               User Key  (r) = Recorded By, (t) = Taken By, (c) = Cosigned By      Initials Name Provider Type    HW Elida Nuñez, PT Physical Therapist                   Clinical Impression       Row Name 02/06/24 1350          Pain    Pretreatment Pain Rating 2/10  -HW     Posttreatment Pain Rating 7/10  -HW     Pain Location - Side/Orientation Right  -HW     Pain Location anterior  -HW     Pain  Location - knee  -     Pain Intervention(s) Ambulation/increased activity;Repositioned;Elevated;Cold applied  -       Row Name 02/06/24 1353          Plan of Care Review    Plan of Care Reviewed With patient  -     Progress no change  -     Outcome Evaluation Pt amb 200' with FWW and CGAx2. Pt navigated a step without difficulty and good sequencing recall. No knee buckling or LOB noted. Activity limited by elevated pain. RN notified. HEP and precautions reviewed with pt. R knee ROM 10-92. Frequent ambulation at home encouraged. Pt verbalized understanding. Car transfer reviewed with pt. Recommend d/c home with assist and OPPT when medically appropriate. Pt cleared to d/c today from PT standpoint. She will need FWW prior to d/c. ADLs assessed, no needs identified for OT consult at this time.  -       Row Name 02/06/24 1353          Therapy Assessment/Plan (PT)    Rehab Potential (PT) good, to achieve stated therapy goals  -     Criteria for Skilled Interventions Met (PT) yes;meets criteria;skilled treatment is necessary  -     Therapy Frequency (PT) 2 times/day  -       Row Name 02/06/24 1353          Vital Signs    Pre Patient Position Supine  -     Intra Patient Position Standing  -     Post Patient Position Sitting  -       Row Name 02/06/24 1353          Positioning and Restraints    Pre-Treatment Position in bed  -     Post Treatment Position chair  -     In Chair notified nsg;reclined;sitting;call light within reach;encouraged to call for assist;exit alarm on;legs elevated;compression device  ice applied  -               User Key  (r) = Recorded By, (t) = Taken By, (c) = Cosigned By      Initials Name Provider Type     Elida Nuñez, ANOOP Physical Therapist                   Outcome Measures       Row Name 02/06/24 1356 02/06/24 1100       How much help from another person do you currently need...    Turning from your back to your side while in flat bed without using bedrails? 4  -  3  -LB    Moving from lying on back to sitting on the side of a flat bed without bedrails? 4  -HW 3  -LB    Moving to and from a bed to a chair (including a wheelchair)? 3  -HW 2  -LB    Standing up from a chair using your arms (e.g., wheelchair, bedside chair)? 3  -HW 2  -LB    Climbing 3-5 steps with a railing? 3  -HW 2  -LB    To walk in hospital room? 3  -HW 2  -LB    AM-PAC 6 Clicks Score (PT) 20  -HW 14  -LB    Highest Level of Mobility Goal 6 --> Walk 10 steps or more  -HW 4 --> Transfer to chair/commode  -      Row Name 02/06/24 1356          PADD    Diagnosis 1  -     Gender 1  -     Age Group 1  -HW     Gait Distance 1  -HW     Assist Level 1  -HW     Home Support 3  -HW     PADD Score 8  -HW     Patient Preference home with outpatient rehab  -     Prediction by PADD Score directly home (with home health or out-patient rehab)  -       Row Name 02/06/24 1356          Functional Assessment    Outcome Measure Options AM-PAC 6 Clicks Basic Mobility (PT);PADD  -               User Key  (r) = Recorded By, (t) = Taken By, (c) = Cosigned By      Initials Name Provider Type    Bessie Mckenna RN Registered Nurse     Elida Nuñez, ANOOP Physical Therapist                                 Physical Therapy Education       Title: PT OT SLP Therapies (Done)       Topic: Physical Therapy (Done)       Point: Mobility training (Done)       Learning Progress Summary             Patient Acceptance, E,D, VU,DU by  at 2/6/2024 1357                         Point: Home exercise program (Done)       Learning Progress Summary             Patient Acceptance, E,D, VU,DU by  at 2/6/2024 1357                         Point: Body mechanics (Done)       Learning Progress Summary             Patient Acceptance, E,D, VU,DU by  at 2/6/2024 1357                         Point: Precautions (Done)       Learning Progress Summary             Patient Acceptance, E,D, VU,DU by  at 2/6/2024 1357                                          User Key       Initials Effective Dates Name Provider Type Discipline     12/15/23 -  Elida Nuñez, ANOOP Physical Therapist PT                  PT Recommendation and Plan  Planned Therapy Interventions (PT): balance training, bed mobility training, gait training, home exercise program, ROM (range of motion), patient/family education, stair training, strengthening, stretching, transfer training  Plan of Care Reviewed With: patient  Progress: no change  Outcome Evaluation: Pt amb 200' with FWW and CGAx2. Pt navigated a step without difficulty and good sequencing recall. No knee buckling or LOB noted. Activity limited by elevated pain. RN notified. HEP and precautions reviewed with pt. R knee ROM 10-92. Frequent ambulation at home encouraged. Pt verbalized understanding. Car transfer reviewed with pt. Recommend d/c home with assist and OPPT when medically appropriate. Pt cleared to d/c today from PT standpoint. She will need FWW prior to d/c. ADLs assessed, no needs identified for OT consult at this time.     Time Calculation:   PT Evaluation Complexity  History, PT Evaluation Complexity: 1-2 personal factors and/or comorbidities  Examination of Body Systems (PT Eval Complexity): total of 3 or more elements  Clinical Presentation (PT Evaluation Complexity): stable  Clinical Decision Making (PT Evaluation Complexity): low complexity  Overall Complexity (PT Evaluation Complexity): low complexity     PT Charges       Row Name 02/06/24 1358             Time Calculation    Start Time 1310  -HW      PT Received On 02/06/24  -      PT Goal Re-Cert Due Date 02/16/24  -         Timed Charges    72283 - PT Therapeutic Exercise Minutes 4  -HW      88328 - Gait Training Minutes  5  -HW         Untimed Charges    PT Eval/Re-eval Minutes 48  -HW         Total Minutes    Timed Charges Total Minutes 9  -HW      Untimed Charges Total Minutes 48  -HW       Total Minutes 57  -HW                User Key  (r) = Recorded By, (t)  = Taken By, (c) = Cosigned By      Initials Name Provider Type     Elida Nuñez, PT Physical Therapist                  Therapy Charges for Today       Code Description Service Date Service Provider Modifiers Qty    64859821104 HC GAIT TRAINING EA 15 MIN 2/6/2024 Elida Nuñez, PT GP 1    [id number] HC PT EVAL LOW COMPLEXITY 4 2/6/2024 Elida Nuñez, PT GP 1    [id number]  PT THER SUPP EA 15 MIN 2/6/2024 Elida Nuñez, PT GP 3            PT G-Codes  Outcome Measure Options: AM-PAC 6 Clicks Basic Mobility (PT), PADD  AM-PAC 6 Clicks Score (PT): 20  PT Discharge Summary  Anticipated Discharge Disposition (PT): home with assist, home with outpatient therapy services    Elida Nuñez PT  2/6/2024

## 2024-02-06 NOTE — H&P
Patient Care Team:      Chief complaint  right knee pain    Subjective:    Patient is a 68 y.o.female presents with a several year history of right knee pain,  She states she is constantly aware of her right knee and has pain with movement.  She has had gel and steroid injections and has used NSAIDS with  limited improvements  She presents today for right knee replacement    Review of Systems:  General ROS: negative  Cardiovascular ROS: no chest pain or dyspnea on exertion  Respiratory ROS: no cough, shortness of breath, or wheezing      Allergies: No Known Allergies       Latex:neg  Contrast Dye neg    Home Meds    Medications Prior to Admission   Medication Sig Dispense Refill Last Dose    amLODIPine (NORVASC) 5 MG tablet Take 1 tablet by mouth Daily.       aspirin 81 MG chewable tablet Chew 1 tablet Daily.       atorvastatin (LIPITOR) 10 MG tablet Take 1 tablet by mouth every night at bedtime.       Chlorhexidine Gluconate 4 % solution Shower with solution as directed for 5 days prior to surgery 237 mL 0     cholecalciferol (VITAMIN D3) 1000 units tablet Take 1 tablet by mouth Daily.       famotidine (PEPCID) 40 MG tablet        losartan (COZAAR) 50 MG tablet Take 1 tablet by mouth Daily.       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    Nephrolithiasis     Osteoarthritis 01/17/2017    Osteopenia 01/17/2017    Premature ventricular contractions 01/17/2017    with appropriate suppression on stress echocardiography.    Tendinitis of knee ?    Maybe     PSH:    Past Surgical History:   Procedure Laterality Date    CATARACT EXTRACTION Bilateral     CHOLECYSTECTOMY      COLONOSCOPY      DENTAL PROCEDURE      [place] teeth  extraction.    FOOT SURGERY  2023    Remove ingrown toenail    KIDNEY STONE SURGERY      Lithotripsy x2.    KNEE ARTHROSCOPY Right     TONSILLECTOMY       Immunization History: pneumo  up to datye    Flu 2023  Tetanus ?  COVISD x3  Social History:   Tobacco quit in the '70s   Alcohol daily      Physical Exam:97.7  142/79  02sat 95%  HR 81      General Appearance:    Alert, cooperative, no distress, appears stated age   Head:    Normocephalic, without obvious abnormality, atraumatic   Lungs:     Clear to auscultation bilaterally, respirations unlabored    Heart: Regular rate and rhythm, S1 and S2 normal, no murmur, rub    or gallop    Abdomen:    Soft without tenderness   Breast Exam:    deferred   Genitalia:    deferred   Extremities:   Extremities normal, atraumatic, no cyanosis or edema   Skin:   Skin color, texture, turgor normal, no rashes or lesions   Neurologic:   Grossly intact     Results Review:   LABS:  Lab Results   Component Value Date    WBC 7.05 01/23/2024    HGB 14.2 01/23/2024    HCT 43.6 01/23/2024    .1 (H) 01/23/2024     01/23/2024    NEUTROABS 3.49 01/23/2024    GLUCOSE 146 (H) 01/23/2024    BUN 17 01/23/2024    CREATININE 1.01 (H) 01/23/2024    EGFRIFNONA 64 01/13/2017     01/23/2024    K 3.9 01/23/2024     01/23/2024    CO2 23.0 01/23/2024    CALCIUM 9.1 01/23/2024    ALBUMIN 4.20 01/13/2017    AST 79 (H) 01/13/2017     (H) 01/13/2017    BILITOT 1.1 01/13/2017       RADIOLOGY:  Imaging Results (Last 72 Hours)       ** No results found for the last 72 hours. **                 Cancer Patient: __ yes __no __unknown; If yes, clinical stage T:__ N:__M:__, stage group    Impression: primary osteoarthritis right knee    Plan: right total knee arthroplasty with ISMALE Lara PA-C 2/6/2024 06:52 EST      Agree with above - plan for right TKA    Irvin Del Cid MD  02/06/24  07:12 EST

## 2024-02-06 NOTE — DISCHARGE INSTRUCTIONS
InfuBLOCK - Patient Information    What is a pain pump?  The InfuBLOCK pump delivers post-operative, non-narcotic, numbing medication to the nerve near the surgical site for pain relief.     Where can I find information about my pain pump?           For more information about your pain pump, scan the QR code.  For additional patient resources, visit [employer]/resources-pain-management.                                                                                               While your physician is your primary source for information about your treatment there may be times during your treatment that you need assistance with your infusion pump.     If you need assistance take the following steps:    The [employer] Nursing Hotline is Here for You 24/7.  Please call [phone] for the following concerns or complications:    Answers to questions about your infusion pump                 Tubing disconnect  Assistance with pump alarms                                                      Dislodged catheter  Excessive leakage noted from pump                                         Inadequate pain control    2.   [facility] Anesthesia Acute Pain Service: [phone] is available 24/7 for any further needs or concerns about medication or pain control.     -------------------------------------------------------------------------    Nerve Catheter Removal Instructions  When your device is empty:    Remove your catheter by pulling the dressing off slowly (like you would remove a regular bandage). The catheter should pull right out of the skin.  Check that the BLUE tip is intact.                                                                                     If the catheter is stuck, reposition your   extremity and pull slowly until removed.  *If catheter is HURTING and WON'T come out, stop and call [phone] for further assistance.    Remove medication bag from the black carrying case.  Cut the  tubing on right and left side of pump, and discard the medication bag and tubing into garbage.  Place the pump and black carrying case into the plastic bag and then place this into the return box.  Seal box with blue stickers and return to [place] postal service. THIS IS PRE-PAID POSTAGE.        -------------------------------------------------------------------------    [facility] COLD THERAPY - PATIENT INSTRUCTION SHEET    Cold Compression Therapy for your comfort and rehabilitation  Your caregivers want you to be productive in your rehab and comfortable during your stay. In keeping with those goals, you will be receiving an SMI Cold Therapy Wrap to help ease post-operative pain and swelling that might keep you from getting back on track! Your SMI Cold Therapy Wrap is effective and simple-to-use, and you will be encouraged to apply it throughout your hospital stay and at home through the duration of your recovery.    When you are ready to go home  Be sure to take your SMI Cold Therapy Wrap and both sets of Gel Bags with you for continued comfort and use throughout your rehabilitation. If you don't already have them, ask your nurse or aide to retrieve your SMI Gel Bags from the patient freezer.    Home use precautions  Always follow your medical professional's application instructions upon discharge. Your SMI Cold Therapy Wrap and Gel Bags are designed to last for months following your surgery. Never heat the Gel Bags unless specified by your healthcare provider. Supervision is advised when using this product on children or geriatric patients. To avoid danger of suffocation, please keep the outer plastic packaging away from children & pets.    Cold Therapy Instructions  Place Gel Bags in a freezer set ¾ of the way to max temperature for at least (4) hours. For best results, lay the Gel Bags flat and [id number] in the freezer. Once frozen, slide Gel Bags into the gel pouch and secure your wrap to the affected area with the  straps.  Gel wraps that have been stored in a freezer for an extended period of time may require a (10) minute period of softening up in a room temperature environment before application.  The gel pouch acts as a protective barrier. NEVER place frozen bags directly onto skin, as this may cause frostbite injury.  The [facility] Cold Therapy Wrap is designed to be able to be worm while ambulating. The compression straps can be secured well enough so that the Wrap won't fall off while moving.  Wrap Application Videos can be viewed at [employer].  An additional protective barrier such as clothing, a washcloth, hand-towel or pillowcase may be used during prolonged treatment applications.  The Gel-Pouch and Wrap are both Latex-Free and the Gel Bag ingredients are non toxic.    [facility] Wrap care instructions  The [facility] Cold Therapy Wrap may be hand washed and hung to dry when needed.    [facility] re-order information  Additional [facility] body specific wraps and/or Gel Bags can be re-ordered from [employer] or call [phone] ([phone])

## 2024-02-06 NOTE — DISCHARGE PLACEMENT REQUEST
"Funmilayo Cantu \"Giovana\" (68 y.o. Female)     Naina Collins [facility]  [phone]          Date of Birth   1955    Social Security Number       Address   [address] [place] [address]    Home Phone   [phone]    MRN   [id number]       Yazidi   Mormon    Marital Status                               Admission Date   24    Admission Type   Elective    Admitting Provider   Irvin Del Cid MD    Attending Provider   Irvin Del Cid MD    Department, Room/Bed   [facility] 3H, S382/1       Discharge Date       Discharge Disposition       Discharge Destination                                 Attending Provider: Irvin Del Cid MD    Allergies: No Known Allergies    Isolation: None   Infection: None   Code Status: CPR    Ht: 154.9 cm (61\")   Wt: 78 kg (172 lb)    Admission Cmt: None   Principal Problem: S/P total knee arthroplasty, right [Z96.651]                   Active Insurance as of 2024       Primary Coverage       Payor Plan Insurance Group Employer/Plan Group    HUMANA MEDICARE REPLACEMENT HU[employer] MED ADV GROUP [id number]       Payor Plan Address Payor Plan Phone Number Payor Plan Fax Number Effective Dates    [address] [address] [phone]  2020 - None Entered    [facility] [id number]         Subscriber Name Subscriber Birth Date Member ID       FUNMILAYO CANTU 1955 [id number]                     Emergency Contacts        (Rel.) Home Phone Work Phone Mobile Phone    Rui Cantu (Spouse) -- -- [phone]             [address]  [address] [facility] [address]  Phone:  [phone]  Fax:  [phone] Date: 2024      Ambulatory Referral to Physical Therapy Evaluate and treat     Patient:  Funmilayo Cantu MRN:  [id number]   [address] [place] [address] :  1955  SSN:    Phone: [phone] Sex:  F      INSURANCE PAYOR PLAN GROUP # SUBSCRIBER ID   Primary:    HUMANA MEDICARE " REPLACEMENT [id number]      Referring Provider Information:  JEREMI DEL CID Phone: [phone] Fax: [phone]       Referral Information:   # Visits:  1 Referral Type: Physical Therapy [AE1]   Urgency:  Routine Referral Reason: Specialty Services Required   Start Date: Feb 6, 2024 End Date:  To be determined by Insurer   Diagnosis: Primary osteoarthritis of right knee (M17.11 [ICD-10-CM] 715.16 [ICD-9-CM])  S/P total knee arthroplasty, right (Z96.651 [ICD-10-CM] V43.65 [ICD-9-CM])      Refer to Dept:   Refer to Provider:   Refer to Provider Phone:   Refer to Facility:       Specialty needed: Evaluate and treat  Follow-up needed: Yes     This document serves as a request of services and does not constitute Insurance authorization or approval of services.  To determine eligibility, please contact the members Insurance carrier to verify and review coverage.     If you have medical questions regarding this request for services. Please contact [address] at [phone] during normal business hours.        Verbal Order Mode: Verbal with readback   Authorizing Provider: Jeremi Del Cid MD  Authorizing Provider's NPI: [id number]     Order Entered By: Naina Collins RN 2/6/2024  1:54 PM     Electronically signed by:         Insurance Information                  HUMANA MEDICARE REPLACEMENT/HUMANA MED ADV GROUP Phone: [phone]    Subscriber: Funmilayo Cantu Subscriber#: [id number]    Group#: [id number] Precert#: --             History & Physical        Jeremi Del Cid MD at 02/06/24 0652            Patient Care Team:      Chief complaint  right knee pain    Subjective:    Patient is a 68 y.o.female presents with a several year history of right knee pain,  She states she is constantly aware of her right knee and has pain with movement.  She has had gel and steroid injections and has used NSAIDS with  limited improvements  She presents today for right knee replacement    Review of  Systems:  General ROS: negative  Cardiovascular ROS: no chest pain or dyspnea on exertion  Respiratory ROS: no cough, shortness of breath, or wheezing      Allergies: No Known Allergies       Latex:neg  Contrast Dye neg    Home Meds    Medications Prior to Admission   Medication Sig Dispense Refill Last Dose    amLODIPine (NORVASC) 5 MG tablet Take 1 tablet by mouth Daily.       aspirin 81 MG chewable tablet Chew 1 tablet Daily.       atorvastatin (LIPITOR) 10 MG tablet Take 1 tablet by mouth every night at bedtime.       Chlorhexidine Gluconate 4 % solution Shower with solution as directed for 5 days prior to surgery 237 mL 0     cholecalciferol (VITAMIN D3) 1000 units tablet Take 1 tablet by mouth Daily.       famotidine (PEPCID) 40 MG tablet        losartan (COZAAR) 50 MG tablet Take 1 tablet by mouth Daily.       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    Nephrolithiasis     Osteoarthritis 01/17/2017    Osteopenia 01/17/2017    Premature ventricular contractions 01/17/2017    with appropriate suppression on stress echocardiography.    Tendinitis of knee ?    Maybe     PSH:    Past Surgical History:   Procedure Laterality Date    CATARACT EXTRACTION Bilateral     CHOLECYSTECTOMY      COLONOSCOPY      DENTAL PROCEDURE      [place] teeth extraction.    FOOT SURGERY  2023    Remove ingrown toenail    KIDNEY STONE SURGERY      Lithotripsy x2.    KNEE ARTHROSCOPY Right     TONSILLECTOMY       Immunization History: pneumo  up to datye    Flu 2023  Tetanus ?  COVISD x3  Social History:   Tobacco quit in the '70s   Alcohol daily      Physical Exam:97.7  142/79  02sat 95%  HR 81      General Appearance:    Alert, cooperative, no  distress, appears stated age   Head:    Normocephalic, without obvious abnormality, atraumatic   Lungs:     Clear to auscultation bilaterally, respirations unlabored    Heart: Regular rate and rhythm, S1 and S2 normal, no murmur, rub    or gallop    Abdomen:    Soft without tenderness   Breast Exam:    deferred   Genitalia:    deferred   Extremities:   Extremities normal, atraumatic, no cyanosis or edema   Skin:   Skin color, texture, turgor normal, no rashes or lesions   Neurologic:   Grossly intact     Results Review:   LABS:  Lab Results   Component Value Date    WBC 7.05 01/23/2024    HGB 14.2 01/23/2024    HCT 43.6 01/23/2024    .1 (H) 01/23/2024     01/23/2024    NEUTROABS 3.49 01/23/2024    GLUCOSE 146 (H) 01/23/2024    BUN 17 01/23/2024    CREATININE 1.01 (H) 01/23/2024    EGFRIFNONA 64 01/13/2017     01/23/2024    K 3.9 01/23/2024     01/23/2024    CO2 23.0 01/23/2024    CALCIUM 9.1 01/23/2024    ALBUMIN 4.20 01/13/2017    AST 79 (H) 01/13/2017     (H) 01/13/2017    BILITOT 1.1 01/13/2017       RADIOLOGY:  Imaging Results (Last 72 Hours)       ** No results found for the last 72 hours. **                 Cancer Patient: __ yes __no __unknown; If yes, clinical stage T:__ N:__M:__, stage group    Impression: primary osteoarthritis right knee    Plan: right total knee arthroplasty with ISMAEL Lara PA-C 2/6/2024 06:52 EST      Agree with above - plan for right TKA    Irvin Del Cid MD  02/06/24  07:12 EST        Electronically signed by Irvin Del Cid MD at 02/06/24 0717

## 2024-02-06 NOTE — CASE MANAGEMENT/SOCIAL WORK
Continued Stay Note  [facility]     Patient Name: Funmilayo Cantu  MRN: [id number]  Today's Date: 2/6/2024    Admit Date: 2/6/2024    Plan: home with [facility]   Discharge Plan       Row Name 02/06/24 1357       Plan    Plan home with [employer]    Patient/Family in Agreement with Plan yes    Plan Comments Pt lives in [facility] with her . She is independent with ADLs and denies current use of any DME. She is followed by her PCP and has drug coverage. At this time she has requested the [facility] for PT and a rolling walker was brought to her room via [employer]. No other dc needs at this time    Final Discharge Disposition Code 01 - home or self-care                   Discharge Codes    No documentation.                       Naina Collins RN

## 2024-02-20 ENCOUNTER — TELEPHONE (OUTPATIENT)
Dept: ORTHOPEDIC SURGERY | Facility: CLINIC | Age: 69
End: 2024-02-20
Payer: MEDICARE

## 2024-02-20 DIAGNOSIS — Z96.651 S/P TOTAL KNEE ARTHROPLASTY, RIGHT: ICD-10-CM

## 2024-02-20 RX ORDER — OXYCODONE HYDROCHLORIDE 5 MG/1
5 TABLET ORAL EVERY 6 HOURS PRN
Qty: 30 TABLET | Refills: 0 | Status: SHIPPED | OUTPATIENT
Start: 2024-02-20

## 2024-02-20 NOTE — TELEPHONE ENCOUNTER
Refill sent to the pharmacy.  She can also take a shower without covering the surgical site as long as it is clean and dry.  Do not remove the glue tape. Dr Del Cid      I spoke with the patient to let her know that the refill was sent in and also what Dr Del Cid noted above.  She had no further questions.    Marcia RUBIO (CHRIS) ROT

## 2024-02-20 NOTE — TELEPHONE ENCOUNTER
Caller: PEDRO     Relationship: SELF    Best call back number: [phone] (home)       Requested Prescriptions:   Requested Prescriptions     Pending Prescriptions Disp Refills    oxyCODONE (Roxicodone) 5 MG immediate release tablet 40 tablet 0     Sig: Take 1 tablet by mouth Every 4 (Four) Hours As Needed for Moderate Pain.        Pharmacy where request should be sent: [facility] [address] [phone] [facility] [phone]      Last office visit with prescribing clinician: 12/6/2023   Last telemedicine visit with prescribing clinician: Visit date not found   Next office visit with prescribing clinician: Visit date not found     Additional details provided by patient: PATIENT CALLING ASKING IF SHE CAN TAKE A SHOWER WITHOUT COVERING HER SURGICAL SITE     Does the patient have less than a 3 day supply:  [x] Yes  [] No    Would you like a call back once the refill request has been completed: [x] Yes [] No    If the office needs to give you a call back, can they leave a voicemail: [x] Yes [] No    Crystal Gilmore, PCT   02/20/24 09:36 EST

## 2024-02-22 ENCOUNTER — TELEPHONE (OUTPATIENT)
Dept: ORTHOPEDIC SURGERY | Facility: CLINIC | Age: 69
End: 2024-02-22
Payer: MEDICARE

## 2024-02-22 ENCOUNTER — OFFICE VISIT (OUTPATIENT)
Dept: ORTHOPEDIC SURGERY | Facility: CLINIC | Age: 69
End: 2024-02-22
Payer: MEDICARE

## 2024-02-22 VITALS — TEMPERATURE: 97.1 F

## 2024-02-22 DIAGNOSIS — Z96.651 S/P TOTAL KNEE ARTHROPLASTY, RIGHT: Primary | ICD-10-CM

## 2024-02-22 PROCEDURE — 1159F MED LIST DOCD IN RCRD: CPT | Performed by: PHYSICIAN ASSISTANT

## 2024-02-22 PROCEDURE — 1160F RVW MEDS BY RX/DR IN RCRD: CPT | Performed by: PHYSICIAN ASSISTANT

## 2024-02-22 PROCEDURE — 99024 POSTOP FOLLOW-UP VISIT: CPT | Performed by: PHYSICIAN ASSISTANT

## 2024-02-22 NOTE — TELEPHONE ENCOUNTER
PATIENT IS CALLING IN REGARDS TO HER RIGHT KNEE. SHE STATES IT'S BEEN WARM AND RED AND IT'S STARTING TO STREAK. SHE DIDN'T KNOW IF THIS WAS NORMAL OR SIGNS OF INFECTION. SHE IS GOING TO UPLOAD SOME PICS.     IF CLINICAL STAFF COULD PLEASE ADVISE.       CALL BACK # [phone]

## 2024-02-22 NOTE — TELEPHONE ENCOUNTER
I showed Manfred the pictures and she suggested patient come in today to be evaluated. I called Giovana, she is coming in at 1:00 today to see manfred.  Roxie BARRIOS (R), ROT

## 2024-02-22 NOTE — PROGRESS NOTES
[facility] Orthopaedic Surgery Clinic Note        Subjective     Post-op Follow-up (2 weeks S/P Total Knee Arthroplasty With Cori Robot - Right (DOS: 2/6/24))       AQUILES Cantu is a 68 y.o. female.  Patient contacted the clinic concerned about redness with streaking as well as warmth to her right knee.  She is status post right TKA with Cori robot by Dr. Del Cid on the above-stated date.  She reports she noticed the increased warmth and redness about 2 days ago.    Pain scale: 5/10. Associated symptoms pain, swelling, redness, warmth. Pain with walking, standing, stairs, sleeping, lying on the affected side, rising from a seated position. Using walker to assist with ambulation. Attending OPPT.    Patient denies any fever, chills, night sweats or other constitutional symptoms.          Objective      Physical Exam  Temp 97.1 °F (36.2 °C)   LMP  (LMP Unknown)     There is no height or weight on file to calculate BMI.        Ortho Exam  Integument:   Right knee: Incision is healing well without redness, drainage or signs of infection.  Slight warmth but this is typical following TKA's.  Positive bruising which is typical following TKA.    Lower Extremities:   Right knee:    Tenderness:  Generalized pain typical following TKA    Effusion:  1+     Swelling: Positive with soft, nontender calf    Crepitus:  None    Range of motion:  Extension: 5°       Flexion: 90°     Passive range of motion of the knee--negative for pain  Instability:  No varus laxity, no valgus laxity, negative anterior drawer  Deformities:  None    Straight leg raise: Intact      Imaging Reviewed:  Ordered right knee plain films.  Imaging read/interpreted by Dr. Starks.    Imaging Results (Last 24 Hours)       Procedure Component Value Units Date/Time    XR Knee 3+ View With [place] Right [[id number]] Resulted: 02/22/24 1312     Updated: 02/22/24 1313    Narrative:      Indication: Status post right total knee arthroplasty    Comparison: Todays  xrays were compared to previous xrays from 2/6/2024      Impression:           Right Knee: Demonstrate well positioned knee arthroplasty components in   satisfactory alignment without evidence of wear, loosening, subsidence,   fracture, or osteolysis and No significant changes compared to prior   radiographs.               Assessment:  1. S/P total knee arthroplasty, right        Plan:  Status post right TKA with Cori robot, stable.  Reviewed imaging with patient.  Based on exam no evidence of infection.  The warmth and bruising that she is noted is very typical following TKA.  Continue with outpatient PT.  Continue with current postoperative rehab course.  Continue with current pain control regimen.  Continue with ASA as directed for DVT prophylaxis.  Keep follow-up appointment on 2/26/2024. No imaging needed.  Questions and concerns answered.      Cammie Villagomez PA-C  02/22/24  14:25 EST      Dictated Utilizing Dragon Dictation.

## 2024-02-26 ENCOUNTER — OFFICE VISIT (OUTPATIENT)
Dept: ORTHOPEDIC SURGERY | Facility: CLINIC | Age: 69
End: 2024-02-26
Payer: MEDICARE

## 2024-02-26 VITALS — TEMPERATURE: 98.3 F

## 2024-02-26 DIAGNOSIS — Z96.651 S/P TOTAL KNEE ARTHROPLASTY, RIGHT: Primary | ICD-10-CM

## 2024-02-26 PROCEDURE — 1160F RVW MEDS BY RX/DR IN RCRD: CPT | Performed by: PHYSICIAN ASSISTANT

## 2024-02-26 PROCEDURE — 99024 POSTOP FOLLOW-UP VISIT: CPT | Performed by: PHYSICIAN ASSISTANT

## 2024-02-26 PROCEDURE — 1159F MED LIST DOCD IN RCRD: CPT | Performed by: PHYSICIAN ASSISTANT

## 2024-02-26 NOTE — PROGRESS NOTES
"    [facility] [place] [facility] [place]        Subjective     Post-op (1 week f/u  --- 3  weeks S/P Total Knee Arthroplasty With Cori Robot - Right (DOS: 2/6/24))/ /)       AQUILES Cantu is a 68 y.o. female.   Patient presents for their postop visit following right TKA with Cori robot performed on the above date by Dr. Del Cid.  Patient reports yesterday she had a \"bad\" day because she did too much the day prior to that.    Pain scale: 4/10. Associated symptoms pain, swelling. Pain with walking, standing, stairs, sleeping, laying on the affected side, rising from a seated position.  Resting, ice, medication, lying down, elevating helps to ease the pain. Using cane to assist with ambulation. Attending OPPT.    Patient denies any fever, chills, night sweats or other constitutional symptoms.           Objective      Physical Extheiram  Temp 98.3 °F (36.8 °C)   LMP  (LMP Unknown)     There is no height or weight on file to calculate BMI.        Ortho Exam  Integument:   Right knee: Incision is healing well without redness, warmth, drainage or signs of infection.    Lower Extremities:   Right knee:    Tenderness:  Generalized pain typical following TKA    Effusion:  Trace to 1+    Swelling: Mild with soft calf    Crepitus:  None    Range of motion:  Extension: 5°       Flexion: 90/95°  Instability:  No varus laxity, no valgus laxity, negative anterior drawer  Deformities:  None      Imaging Reviewed:  No new imaging today.      Assessment:  1. S/P total knee arthroplasty, right        Plan:  Status post right TKA with Cori robot, stable.  Continue with outpatient PT.  Continue with current pain management regimen.  Recommend OTC NSAIDS/pain medication as needed.  Continue with ASA as directed for DVT prophylaxis.  No dental procedures until minimum of 3 months out from surgery.  Patient will require indefinite antibiotic prophylaxis before dental appointments.  Follow up with Dr. Del Cid in 6 weeks for repeat " evaluation. No imaging needed.  Questions and concerns answered.    Answers submitted by the patient for this visit:  Other (Submitted on 2/19/2024)  Please describe your symptoms.: Check up post knee replacement surgery  Have you had these symptoms before?: No  How long have you been having these symptoms?: Greater than 2 weeks  Primary Reason for Visit (Submitted on 2/19/2024)  What is the primary reason for your visit?: Other      Cammie Villagomez PA-C  02/26/24  09:27 EST      Dictated Utilizing Dragon Dictation.

## 2024-03-09 DIAGNOSIS — Z96.651 S/P TOTAL KNEE ARTHROPLASTY, RIGHT: ICD-10-CM

## 2024-03-11 RX ORDER — OXYCODONE HYDROCHLORIDE 5 MG/1
5 TABLET ORAL EVERY 6 HOURS PRN
Qty: 30 TABLET | Refills: 0 | Status: SHIPPED | OUTPATIENT
Start: 2024-03-11

## 2024-03-11 NOTE — TELEPHONE ENCOUNTER
Dr. Del Cid-please advise oxycodone refill for this pt who is almost 5 weeks s/p TKA 2/6/24. Last refill provided on 2/20/24.    Thank you!  Jaylon ARMENDARIZ CMA ([facility]), ROT

## 2024-04-08 ENCOUNTER — OFFICE VISIT (OUTPATIENT)
Dept: ORTHOPEDIC SURGERY | Facility: CLINIC | Age: 69
End: 2024-04-08
Payer: MEDICARE

## 2024-04-08 DIAGNOSIS — Z47.89 ORTHOPEDIC AFTERCARE: ICD-10-CM

## 2024-04-08 DIAGNOSIS — Z96.651 S/P TOTAL KNEE ARTHROPLASTY, RIGHT: Primary | ICD-10-CM

## 2024-04-08 PROCEDURE — 1160F RVW MEDS BY RX/DR IN RCRD: CPT | Performed by: ORTHOPAEDIC SURGERY

## 2024-04-08 PROCEDURE — 1159F MED LIST DOCD IN RCRD: CPT | Performed by: ORTHOPAEDIC SURGERY

## 2024-04-08 PROCEDURE — 99024 POSTOP FOLLOW-UP VISIT: CPT | Performed by: ORTHOPAEDIC SURGERY

## 2024-04-08 RX ORDER — TRAMADOL HYDROCHLORIDE 50 MG/1
50 TABLET ORAL EVERY 8 HOURS PRN
Qty: 20 TABLET | Refills: 0 | Status: SHIPPED | OUTPATIENT
Start: 2024-04-08

## 2024-04-08 NOTE — PROGRESS NOTES
[facility] [place] [facility] [place]    Subjective     Chief Complaint   Patient presents with    Post-op     6 week follow up- 9 weeks s/p- Right total knee arthroplasty with Cori robot (DOS: 2/6/24)        HPI    It has been 6  week(s) since Ms. Cantu's last visit. She returns to clinic today for postoperative follow-up of right knee arthroplasty. The issue has been ongoing for 9 week(s). She rates her pain a 3/10 on the pain scale. Previous/current treatments: physical therapy. Current symptoms: pain and stiffness. The pain is worse with standing, sleeping, and rising from seated position; resting, ice, pain medication and/or NSAID, lying down, and elevating the extremity improve the pain. Overall, she is doing better.  The percent improvement compared to her preoperative symptoms.  Ambulatory without external aids.    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    Nephrolithiasis     Osteoarthritis 01/17/2017    Osteopenia 01/17/2017    Premature ventricular contractions 01/17/2017    with appropriate suppression on stress echocardiography.    Tendinitis of knee ?    Maybe      Past Surgical History:   Procedure Laterality Date    CATARACT EXTRACTION Bilateral     CHOLECYSTECTOMY      COLONOSCOPY      DENTAL PROCEDURE      [place] teeth  extraction.    FOOT SURGERY      Remove ingrown toenail    KIDNEY STONE SURGERY      Lithotripsy x2.    KNEE ARTHROSCOPY Right     TONSILLECTOMY      TOTAL KNEE ARTHROPLASTY Right 2024    Procedure: TOTAL KNEE ARTHROPLASTY WITH CORI ROBOT - RIGHT;  Surgeon: Irvin Del Cid MD;  [facility]: [facility];  Service: Robotics - Ortho;  Laterality: Right;      Family History   Problem Relation Age of Onset    Ovarian cancer Sister 49    Cancer Sister         Ovarian    Breast cancer Other 30    Heart disease Mother     Cancer Mother         Non-Hodgkin’s Lymphoma    Heart attack Father 70    Hyperlipidemia Brother     Diabetes Brother     Heart attack Maternal Grandmother     Heart attack Maternal Grandfather     Cancer Paternal Grandfather         Mouth    Heart disease Paternal Grandmother      Social History     Socioeconomic History    Marital status:    Tobacco Use    Smoking status: Former     Current packs/day: 0.00     Average packs/day: 0.5 packs/day for 6.7 years (3.3 ttl pk-yrs)     Types: Cigarettes     Start date: 9/10/1969     Quit date: 1976     Years since quittin.9    Smokeless tobacco: Never    Tobacco comments:     High school + one year college   Vaping Use    Vaping status: Never Used   Substance and Sexual Activity    Alcohol use: Yes     Alcohol/week: 2.0 standard drinks of alcohol     Types: 2 Drinks containing 0.5 oz of alcohol per week     Comment: Ulcer. 2 COCKTAILS IN THE EVENING    Drug use: No    Sexual activity: Yes     Partners: Male     Birth control/protection: Post-menopausal      Current Outpatient Medications on File Prior to Visit   Medication Sig Dispense Refill    amLODIPine (NORVASC) 5 MG tablet Take 1 tablet by mouth Daily.      aspirin (ASPIR) 81 MG EC tablet Take 1 tablet by mouth 2 (Two) Times a Day. (Patient taking differently: Take 1 tablet by mouth Daily.) 60 tablet 0    atorvastatin (LIPITOR) 10 MG tablet Take 1 tablet by mouth every night at bedtime.       cholecalciferol (VITAMIN D3) 1000 units tablet Take 1 tablet by mouth Daily.      famotidine (PEPCID) 40 MG tablet       losartan (COZAAR) 50 MG tablet Take 1 tablet by mouth Daily.      oxyCODONE (Roxicodone) 5 MG immediate release tablet Take 1 tablet by mouth Every 6 (Six) Hours As Needed for Moderate Pain. 30 tablet 0    Restasis 0.05 % ophthalmic emulsion       [DISCONTINUED] aspirin 81 MG chewable tablet Chew 1 tablet Daily.       No current facility-administered medications on file prior to visit.      No Known Allergies     Review of Systems   Constitutional:  Negative for activity change, appetite change, chills, diaphoresis, fatigue, fever and unexpected weight change.   HENT:  Negative for congestion, dental problem, drooling, ear discharge, ear pain, facial swelling, hearing loss, mouth sores, nosebleeds, postnasal drip, rhinorrhea, sinus pressure, sneezing, sore throat, tinnitus, trouble swallowing and voice change.    Eyes:  Negative for photophobia, pain, discharge, redness, itching and visual disturbance.   Respiratory:  Negative for apnea, cough, choking, chest tightness, shortness of breath, wheezing and stridor.    Cardiovascular:  Negative for chest pain, palpitations and leg swelling.   Gastrointestinal:  Negative for abdominal distention, abdominal pain, anal bleeding, blood in stool, constipation, diarrhea, nausea, rectal pain and vomiting.   Endocrine: Negative for cold intolerance, heat intolerance, polydipsia, polyphagia and polyuria.   Genitourinary:  Negative for decreased urine volume, difficulty urinating, dysuria, enuresis, flank pain, frequency, genital sores, hematuria and urgency.   Musculoskeletal:  Positive for arthralgias. Negative for back pain, gait problem, joint swelling, myalgias, neck pain and neck stiffness.   Skin:  Negative for color change, pallor, rash and wound.   Allergic/Immunologic: Negative for environmental allergies, food allergies and immunocompromised  state.   Neurological:  Negative for dizziness, tremors, seizures, syncope, facial asymmetry, speech difficulty, weakness, light-headedness, numbness and headaches.   Hematological:  Negative for adenopathy. Does not bruise/bleed easily.   Psychiatric/Behavioral:  Negative for agitation, behavioral problems, confusion, decreased concentration, dysphoric mood, hallucinations, self-injury, sleep disturbance and suicidal ideas. The patient is not nervous/anxious and is not hyperactive.         Objective      Physical Exam  LMP  (LMP Unknown)     There is no height or weight on file to calculate BMI.    General:   Mental Status:  Alert   Appearance: Cooperative, in no acute distress   Build and Nutrition: Well-nourished well-developed female   Orientation: Alert and oriented to person, place and time   Posture: Normal   Gait: Nonantalgic    Integument:   Right knee: Wound is well-healed with no signs of infection    Lower Extremities:   Right Knee:    Tenderness:  Medial/lateral joint line tenderness    Effusion:  Trace    Swelling: None    Crepitus:  None    Range of motion:  Extension: 5°       Flexion: 110°  Instability:  No varus laxity, no valgus laxity, negative anterior drawer  Deformities:  None      Imaging/Studies  Imaging Results (Last 24 Hours)       ** No results found for the last 24 hours. **          No new imaging today.    Assessment and Plan     Diagnoses and all orders for this visit:    1. S/P total knee arthroplasty, right (Primary)    2. Orthopedic aftercare    Other orders  -     traMADol (ULTRAM) 50 MG tablet; Take 1 tablet by mouth Every 8 (Eight) Hours As Needed for Moderate Pain.  Dispense: 20 tablet; Refill: 0        1. S/P total knee arthroplasty, right    2. Orthopedic aftercare        I reviewed my findings with the patient.  Her right total knee arthroplasty seems to be functioning well, and she is going to continue to work on range of motion.  I will see her back in 6 weeks for checkup.   I will see her back sooner for any problems.  Prescription for tramadol was provided today.  I anticipate this will be healed at her last pain medication prescription.    Return in about 6 weeks (around 5/20/2024).      Irvin Del Cid MD  04/08/24  14:59 EDT    Dictated Utilizing Dragon Dictation

## 2024-05-22 ENCOUNTER — OFFICE VISIT (OUTPATIENT)
Dept: ORTHOPEDIC SURGERY | Facility: CLINIC | Age: 69
End: 2024-05-22
Payer: MEDICARE

## 2024-05-22 VITALS
WEIGHT: 160.4 LBS | HEIGHT: 61 IN | BODY MASS INDEX: 30.29 KG/M2 | SYSTOLIC BLOOD PRESSURE: 124 MMHG | DIASTOLIC BLOOD PRESSURE: 78 MMHG

## 2024-05-22 DIAGNOSIS — Z96.651 S/P TOTAL KNEE ARTHROPLASTY, RIGHT: Primary | ICD-10-CM

## 2024-05-22 DIAGNOSIS — Z47.89 ORTHOPEDIC AFTERCARE: ICD-10-CM

## 2024-05-22 PROCEDURE — 1159F MED LIST DOCD IN RCRD: CPT | Performed by: ORTHOPAEDIC SURGERY

## 2024-05-22 PROCEDURE — 1160F RVW MEDS BY RX/DR IN RCRD: CPT | Performed by: ORTHOPAEDIC SURGERY

## 2024-05-22 PROCEDURE — 3078F DIAST BP <80 MM HG: CPT | Performed by: ORTHOPAEDIC SURGERY

## 2024-05-22 PROCEDURE — 3074F SYST BP LT 130 MM HG: CPT | Performed by: ORTHOPAEDIC SURGERY

## 2024-05-22 PROCEDURE — 99024 POSTOP FOLLOW-UP VISIT: CPT | Performed by: ORTHOPAEDIC SURGERY

## 2024-05-22 RX ORDER — ASPIRIN 81 MG/1
81 TABLET ORAL DAILY
COMMUNITY

## 2024-05-22 RX ORDER — DICYCLOMINE HCL 20 MG
TABLET ORAL AS NEEDED
COMMUNITY
Start: 2024-04-26

## 2024-05-22 NOTE — PROGRESS NOTES
[facility] [place] [facility] [place]    Subjective     Chief Complaint   Patient presents with    Follow-up     6 week follow up -- 3.5 months status post right total knee arthroplasty with Cori robot (DOS 2/6/24)         HPI    It has been 6  week(s) since Ms. Cantu's last visit. She returns to clinic today for follow-up of right knee arthroplasty. The issue has been ongoing for 3.5 month(s). She rates her pain a 2/10 on the pain scale. Previous/current treatments: physical therapy. Current symptoms: stiffness. The pain is worse with walking, sleeping, and rising from seated position; resting, ice, and lying down improve the pain. Overall, she is doing better.  80% improvement compared to her preoperative symptoms.  Fully ambulatory without external aids.    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    Nephrolithiasis     Osteoarthritis 01/17/2017    Osteopenia 01/17/2017    Premature ventricular contractions 01/17/2017    with appropriate suppression on stress echocardiography.    Tendinitis of knee ?    Maybe      Past Surgical History:   Procedure Laterality Date    CATARACT EXTRACTION Bilateral     CHOLECYSTECTOMY      COLONOSCOPY      DENTAL PROCEDURE      [place] teeth extraction.    FOOT SURGERY  2023    Remove ingrown toenail     KIDNEY STONE SURGERY      Lithotripsy x2.    KNEE ARTHROSCOPY Right     TONSILLECTOMY      TOTAL KNEE ARTHROPLASTY Right 2024    Procedure: TOTAL KNEE ARTHROPLASTY WITH CORI ROBOT - RIGHT;  Surgeon: Irvin Del Cid MD;  Location: [facility];  Service: Robotics - Ortho;  Laterality: Right;      Family History   Problem Relation Age of Onset    Ovarian cancer Sister 49    Cancer Sister         Ovarian    Breast cancer Other 30    Heart disease Mother     Cancer Mother         Non-Hodgkin’s Lymphoma    Heart attack Father 70    Hyperlipidemia Brother     Diabetes Brother     Heart attack Maternal Grandmother     Heart attack Maternal Grandfather     Cancer Paternal Grandfather         Mouth    Heart disease Paternal Grandmother      Social History     Socioeconomic History    Marital status:    Tobacco Use    Smoking status: Former     Current packs/day: 0.00     Average packs/day: 0.5 packs/day for 6.7 years (3.3 ttl pk-yrs)     Types: Cigarettes     Start date: 9/10/1969     Quit date: 1976     Years since quittin.0    Smokeless tobacco: Never    Tobacco comments:     High school + one year college   Vaping Use    Vaping status: Never Used   Substance and Sexual Activity    Alcohol use: Yes     Alcohol/week: 2.0 standard drinks of alcohol     Types: 2 Drinks containing 0.5 oz of alcohol per week     Comment: Ulcer. 2 COCKTAILS IN THE EVENING    Drug use: No    Sexual activity: Yes     Partners: Male     Birth control/protection: Post-menopausal      Current Outpatient Medications on File Prior to Visit   Medication Sig Dispense Refill    amLODIPine (NORVASC) 5 MG tablet Take 1 tablet by mouth Daily.      aspirin 81 MG EC tablet Take 1 tablet by mouth Daily.      atorvastatin (LIPITOR) 10 MG tablet Take 1 tablet by mouth every night at bedtime.      cholecalciferol (VITAMIN D3) 1000 units tablet Take 1 tablet by mouth Daily.      dicyclomine (BENTYL) 20 MG tablet As Needed.      famotidine  (PEPCID) 40 MG tablet       losartan (COZAAR) 50 MG tablet Take 1 tablet by mouth Daily.      Restasis 0.05 % ophthalmic emulsion       [DISCONTINUED] aspirin (ASPIR) 81 MG EC tablet Take 1 tablet by mouth 2 (Two) Times a Day. (Patient taking differently: Take 1 tablet by mouth Daily.) 60 tablet 0    [DISCONTINUED] oxyCODONE (Roxicodone) 5 MG immediate release tablet Take 1 tablet by mouth Every 6 (Six) Hours As Needed for Moderate Pain. 30 tablet 0    [DISCONTINUED] traMADol (ULTRAM) 50 MG tablet Take 1 tablet by mouth Every 8 (Eight) Hours As Needed for Moderate Pain. 20 tablet 0     No current facility-administered medications on file prior to visit.      No Known Allergies     Review of Systems   Constitutional:  Negative for activity change, appetite change, chills, diaphoresis, fatigue, fever and unexpected weight change.   HENT:  Negative for congestion, dental problem, drooling, ear discharge, ear pain, facial swelling, hearing loss, mouth sores, nosebleeds, postnasal drip, rhinorrhea, sinus pressure, sneezing, sore throat, tinnitus, trouble swallowing and voice change.    Eyes:  Negative for photophobia, pain, discharge, redness, itching and visual disturbance.   Respiratory:  Negative for apnea, cough, choking, chest tightness, shortness of breath, wheezing and stridor.    Cardiovascular:  Negative for chest pain, palpitations and leg swelling.   Gastrointestinal:  Negative for abdominal distention, abdominal pain, anal bleeding, blood in stool, constipation, diarrhea, nausea, rectal pain and vomiting.   Endocrine: Negative for cold intolerance, heat intolerance, polydipsia, polyphagia and polyuria.   Genitourinary:  Negative for decreased urine volume, difficulty urinating, dysuria, enuresis, flank pain, frequency, genital sores, hematuria and urgency.   Musculoskeletal:  Positive for arthralgias. Negative for back pain, gait problem, joint swelling, myalgias, neck pain and neck stiffness.   Skin:   "Negative for color change, pallor, rash and wound.   Allergic/Immunologic: Negative for environmental allergies, food allergies and immunocompromised state.   Neurological:  Negative for dizziness, tremors, seizures, syncope, facial asymmetry, speech difficulty, weakness, light-headedness, numbness and headaches.   Hematological:  Negative for adenopathy. Does not bruise/bleed easily.   Psychiatric/Behavioral:  Negative for agitation, behavioral problems, confusion, decreased concentration, dysphoric mood, hallucinations, self-injury, sleep disturbance and suicidal ideas. The patient is not nervous/anxious and is not hyperactive.         Objective      Physical Exam  /78   Ht 154.9 cm (61\")   Wt 72.8 kg (160 lb 6.4 oz)   LMP  (LMP Unknown)   BMI 30.31 kg/m²     Body mass index is 30.31 kg/m².    General:   Mental Status:  Alert   Appearance: Cooperative, in no acute distress   Build and Nutrition: Well-nourished well-developed female   Orientation: Alert and oriented to person, place and time   Posture: Normal   Gait: Nonantalgic    Integument:              Right knee: Wound is well-healed with no signs of infection     Lower Extremities:              Right Knee:                          Tenderness:    Medial/lateral joint line tenderness                          Effusion:          Trace                          Swelling:          None                          Crepitus:          None                          Range of motion:        Extension:       5°                                                              Flexion:           110°  Instability:        No varus laxity, no valgus laxity, negative anterior drawer  Deformities:     None    Imaging/Studies  Imaging Results (Last 24 Hours)       ** No results found for the last 24 hours. **          No new imaging today.    Assessment and Plan     Diagnoses and all orders for this visit:    1. S/P total knee arthroplasty, right (Primary)    2. Orthopedic " aftercare        1. S/P total knee arthroplasty, right    2. Orthopedic aftercare        I reviewed my findings with the patient.  Her right total knee arthroplasty seems to be functioning well, and I will see her back in 9 months, which will be a 1 year checkup with x-rays.  I will see her back sooner for any problems.  She will continue to work on range of motion.    Return in about 9 months (around 2/22/2025).      Irvin Del Cid MD  05/22/24  10:17 EDT    Dictated Utilizing Dragon Dictation

## 2024-10-31 ENCOUNTER — TRANSCRIBE ORDERS (OUTPATIENT)
Dept: ADMINISTRATIVE | Facility: HOSPITAL | Age: 69
End: 2024-10-31
Payer: MEDICARE

## 2024-10-31 DIAGNOSIS — Z12.31 VISIT FOR SCREENING MAMMOGRAM: Primary | ICD-10-CM

## 2024-11-26 LAB
NCCN CRITERIA FLAG: ABNORMAL
TYRER CUZICK SCORE: 3.6

## 2024-12-08 ENCOUNTER — DOCUMENTATION (OUTPATIENT)
Dept: GENETICS | Facility: HOSPITAL | Age: 69
End: 2024-12-08
Payer: MEDICARE

## 2024-12-08 NOTE — PROGRESS NOTES
Meets [facility]CCN Criteria for Genetic Testing  Declines genetic testing? Y   Reason for decline? Could Not Reach Patient   If Reason for Decline is Previous Testing    Amb CARE     Non-CARE     Non-CARE Confirmation    Navigator Confirmed?     Patient Reported?     Elevated Tyrer-Cuzick Score ? Or Equal to 20%    Declines referral?     Reason for decline?        
normal/well-groomed/no distress

## 2024-12-10 NOTE — PROGRESS NOTES
New Cardiology Patient Office Visit      Date: 2024  Patient Name: Funmilayo Cantu  : 1955   MRN: [id number]   PCP: Xin Cantu PA   Referring Provider: Xin Cantu PA     Chief Complaint:    Chief Complaint   Patient presents with    Chest Pain       History of Present Illness: Funmilayo Cantu is a 69 y.o. female who is here today for evaluation of her chest pain.    Patient has multiple risk factors and has a strong family history of coronary artery disease starting having some chest discomfort which does not last a whole lot longer.  Usually sometime it happens with exertion sometimes without exertion.  But her activities are very limited because of her right knee problem.    She denies any paroxysmal nocturnal dyspnea.  She denies any lower extremity edema.  She denies any bleeding.  She denies any weight loss or any weight gain.      Problem List   CARDIAC  Coronary Artery Disease:   High risk     Myocardium:   Dyspnea     Valvular:   No known valvular disease     Electrical:   Normal sinus rhythm     Pericardium:   Normal     CARDIAC RISK FACTORS  Hypertension  Dyslipidemia  10/21/2024:   HDL 62 LDL 94  Borderline diabetes  A1c 6.0  Family History: Premature CAD: Female < 65  Tobacco Use: Former Smoker  Menopausal state - Post-menopausal    NON-CARDIAC  Osteoarthritis  Osteopenia  GERD  Nephrolithiasis    SURGERIES  Right total knee arthroplasty  Bilateral cataract extraction  Cholecystectomy  Tonsillectomy  Kidney stone surgery  Foot surgery      Subjective      Review of Systems:   Review of Systems   Respiratory: Negative.     Cardiovascular:  Positive for chest pain.       Medications:   Current Outpatient Medications   Medication Sig Dispense Refill    amLODIPine (NORVASC) 5 MG tablet Take 1 tablet by mouth Daily.      aspirin 81 MG EC tablet Take 1 tablet by mouth Daily.      atorvastatin (LIPITOR) 10 MG tablet Take 1 tablet by mouth every night at  "bedtime.      cholecalciferol (VITAMIN D3) 1000 units tablet Take 1 tablet by mouth Daily.      dicyclomine (BENTYL) 20 MG tablet As Needed.      famotidine (PEPCID) 40 MG tablet       losartan (COZAAR) 50 MG tablet Take 1 tablet by mouth Daily.      Restasis 0.05 % ophthalmic emulsion        No current facility-administered medications for this visit.           The following portions of the patient's history were reviewed and updated as appropriate: allergies, current medications, past family history, past medical history, past social history, past surgical history and problem list.    Objective   Vital Signs:   Vitals:    12/12/24 0910 12/12/24 0915   BP: 128/66 122/58   BP Location: Right arm Left arm   Patient Position: Sitting Sitting   Cuff Size: Adult Adult   Pulse: 78    SpO2: 98%    Weight: 77.6 kg (171 lb)    Height: 154.9 cm (61\")      Body mass index is 32.31 kg/m².     Physical Exam:  Constitutional:       General: Not in acute distress.     Appearance: Healthy appearance. Not in distress.     Neck:     JVP:Not elevated     Carotid artery: Normal    Pulmonary:      Effort: Pulmonary effort is normal.      Breath sounds: Normal breath sounds. No wheezing. No rhonchi. No rales.     Cardiovascular:      Normal rate. Regular rhythm. Normal S1. Normal S2.      Murmurs: There is no significant murmur.      No gallop. No click. No rub.     Abdominal:      General: Bowel sounds are normal.      Palpations: Abdomen is soft.      Tenderness: There is no abdominal tenderness.    Extremities:     Pulses:Normal radial and pedal pulses     Edema:no edema      Labs:          Lab Results   Component Value Date    HGBA1C 6.00 (H) 01/23/2024           ECG 12 Lead    Date/Time: 12/12/2024 9:38 AM  Performed by: Kenji Nieto MD    Authorized by: Kenji Nieto MD  Comparison: compared with previous ECG from 1/23/2024  Similar to previous ECG  Rhythm: sinus rhythm  Other findings: non-specific ST-T wave " changes    Clinical impression: abnormal EKG          Smoking Cessation:   Tobacco Product History : Patient quit smoking long time ago     Advance Care Planning   ACP discussion was declined by the patient. Patient does not have an advance directive, declines further assistance.            Assessment / Plan      Assessment:   Diagnosis Plan   1. Precordial pain        2. Essential hypertension        3. Mixed hyperlipidemia             Plan:  Patient has multiple risk factors and has no evaluation in the last many years.  We will go ahead and check her stress test and echocardiogram for further delineation of her risk factors for coronary artery disease and hypertensive heart disease.  Her blood pressure has been under good control and she is on Norvasc along with losartan.  Her cholesterol has been under control for last 1 year.  She will continue with her current medications  I have advised her to start doing regular exercise hopefully without affecting her knee.            Follow Up:   Return in about 3 months (around 3/12/2025).    Kenji Nieto MD

## 2024-12-11 ENCOUNTER — HOSPITAL ENCOUNTER (OUTPATIENT)
Dept: MAMMOGRAPHY | Facility: HOSPITAL | Age: 69
Discharge: HOME | End: 2024-12-11
Admitting: PHYSICIAN ASSISTANT
Payer: MEDICARE

## 2024-12-11 DIAGNOSIS — Z12.31 VISIT FOR SCREENING MAMMOGRAM: ICD-10-CM

## 2024-12-11 PROCEDURE — 77063 BREAST TOMOSYNTHESIS BI: CPT

## 2024-12-11 PROCEDURE — 77067 SCR MAMMO BI INCL CAD: CPT

## 2024-12-12 ENCOUNTER — OFFICE VISIT (OUTPATIENT)
Dept: CARDIOLOGY | Facility: CLINIC | Age: 69
End: 2024-12-12
Payer: MEDICARE

## 2024-12-12 VITALS
DIASTOLIC BLOOD PRESSURE: 58 MMHG | SYSTOLIC BLOOD PRESSURE: 122 MMHG | HEART RATE: 78 BPM | OXYGEN SATURATION: 98 % | BODY MASS INDEX: 32.28 KG/M2 | WEIGHT: 171 LBS | HEIGHT: 61 IN

## 2024-12-12 DIAGNOSIS — I10 ESSENTIAL HYPERTENSION: ICD-10-CM

## 2024-12-12 DIAGNOSIS — E78.2 MIXED HYPERLIPIDEMIA: ICD-10-CM

## 2024-12-12 DIAGNOSIS — R07.2 PRECORDIAL PAIN: Primary | ICD-10-CM

## 2024-12-12 PROCEDURE — 1159F MED LIST DOCD IN RCRD: CPT | Performed by: INTERNAL MEDICINE

## 2024-12-12 PROCEDURE — 1160F RVW MEDS BY RX/DR IN RCRD: CPT | Performed by: INTERNAL MEDICINE

## 2024-12-12 PROCEDURE — 99204 OFFICE O/P NEW MOD 45 MIN: CPT | Performed by: INTERNAL MEDICINE

## 2024-12-12 PROCEDURE — 93000 ELECTROCARDIOGRAM COMPLETE: CPT | Performed by: INTERNAL MEDICINE

## 2024-12-12 PROCEDURE — 3078F DIAST BP <80 MM HG: CPT | Performed by: INTERNAL MEDICINE

## 2024-12-12 PROCEDURE — 3074F SYST BP LT 130 MM HG: CPT | Performed by: INTERNAL MEDICINE

## 2024-12-30 ENCOUNTER — HOSPITAL ENCOUNTER (OUTPATIENT)
Dept: MAMMOGRAPHY | Facility: HOSPITAL | Age: 69
Discharge: HOME | End: 2024-12-30
Payer: MEDICARE

## 2024-12-30 ENCOUNTER — HOSPITAL ENCOUNTER (OUTPATIENT)
Dept: ULTRASOUND IMAGING | Facility: HOSPITAL | Age: 69
Discharge: HOME | End: 2024-12-30
Payer: MEDICARE

## 2024-12-30 DIAGNOSIS — R92.8 ABNORMAL MAMMOGRAM: ICD-10-CM

## 2024-12-30 PROCEDURE — 88360 TUMOR IMMUNOHISTOCHEM/MANUAL: CPT | Performed by: RADIOLOGY

## 2024-12-30 PROCEDURE — 88305 TISSUE EXAM BY PATHOLOGIST: CPT | Performed by: RADIOLOGY

## 2024-12-30 PROCEDURE — 77065 DX MAMMO INCL CAD UNI: CPT

## 2024-12-30 PROCEDURE — 25010000002 LIDOCAINE 1 % SOLUTION: Performed by: RADIOLOGY

## 2024-12-30 PROCEDURE — 76642 ULTRASOUND BREAST LIMITED: CPT

## 2024-12-30 PROCEDURE — 77065 DX MAMMO INCL CAD UNI: CPT | Performed by: RADIOLOGY

## 2024-12-30 PROCEDURE — 76642 ULTRASOUND BREAST LIMITED: CPT | Performed by: RADIOLOGY

## 2024-12-30 PROCEDURE — 25010000002 LIDOCAINE 1% - EPINEPHRINE 1:100000 1 %-1:100000 SOLUTION: Performed by: RADIOLOGY

## 2024-12-30 PROCEDURE — A4648 IMPLANTABLE TISSUE MARKER: HCPCS

## 2024-12-30 PROCEDURE — 88342 IMHCHEM/IMCYTCHM 1ST ANTB: CPT | Performed by: RADIOLOGY

## 2024-12-30 PROCEDURE — G0279 TOMOSYNTHESIS, MAMMO: HCPCS | Performed by: RADIOLOGY

## 2024-12-30 PROCEDURE — G0279 TOMOSYNTHESIS, MAMMO: HCPCS

## 2024-12-30 RX ORDER — LIDOCAINE HYDROCHLORIDE AND EPINEPHRINE 10; 10 MG/ML; UG/ML
10 INJECTION, SOLUTION INFILTRATION; PERINEURAL ONCE
Status: COMPLETED | OUTPATIENT
Start: 2024-12-30 | End: 2024-12-30

## 2024-12-30 RX ORDER — LIDOCAINE HYDROCHLORIDE 10 MG/ML
5 INJECTION, SOLUTION INFILTRATION; PERINEURAL ONCE
Status: COMPLETED | OUTPATIENT
Start: 2024-12-30 | End: 2024-12-30

## 2024-12-30 RX ADMIN — Medication 1 ML: at 10:00

## 2024-12-30 RX ADMIN — LIDOCAINE HYDROCHLORIDE,EPINEPHRINE BITARTRATE 3 ML: 10; .01 INJECTION, SOLUTION INFILTRATION; PERINEURAL at 10:01

## 2024-12-30 NOTE — PROGRESS NOTES
Alert and oriented. Denies discomfort, no active bleeding, steri-strips intact, gauze dressing applied. Cold packs given. Questions answered, support given. Verbalizes and demonstrates understanding of post-care instructions, written copy given.

## 2024-12-31 ENCOUNTER — TELEPHONE (OUTPATIENT)
Dept: MAMMOGRAPHY | Facility: HOSPITAL | Age: 69
End: 2024-12-31
Payer: MEDICARE

## 2024-12-31 NOTE — TELEPHONE ENCOUNTER
Patient called in and requested Dr. Buitrago for surgical consult if that is the recommendation of the radiologist when her results are available.

## 2025-01-02 LAB
CYTO UR: NORMAL
LAB AP CASE REPORT: NORMAL
LAB AP CLINICAL INFORMATION: NORMAL
LAB AP DIAGNOSIS COMMENT: NORMAL
LAB AP SPECIAL STAINS: NORMAL
PATH REPORT.FINAL DX SPEC: NORMAL
PATH REPORT.GROSS SPEC: NORMAL

## 2025-01-03 ENCOUNTER — HOSPITAL ENCOUNTER (OUTPATIENT)
Dept: CARDIOLOGY | Facility: HOSPITAL | Age: 70
Discharge: HOME | End: 2025-01-03
Payer: MEDICARE

## 2025-01-03 ENCOUNTER — TELEPHONE (OUTPATIENT)
Dept: MAMMOGRAPHY | Facility: HOSPITAL | Age: 70
End: 2025-01-03
Payer: MEDICARE

## 2025-01-03 VITALS — OXYGEN SATURATION: 96 % | DIASTOLIC BLOOD PRESSURE: 84 MMHG | SYSTOLIC BLOOD PRESSURE: 152 MMHG | HEART RATE: 80 BPM

## 2025-01-03 DIAGNOSIS — R07.2 PRECORDIAL PAIN: ICD-10-CM

## 2025-01-03 LAB
BH CV REST NUCLEAR ISOTOPE DOSE: 9.9 MCI
BH CV STRESS BP STAGE 1: NORMAL
BH CV STRESS DURATION MIN STAGE 1: 4
BH CV STRESS DURATION SEC STAGE 1: 0
BH CV STRESS GRADE STAGE 1: 10
BH CV STRESS HR STAGE 1: 137
BH CV STRESS METS STAGE 1: 5
BH CV STRESS NUCLEAR ISOTOPE DOSE: 33 MCI
BH CV STRESS O2 STAGE 1: 96
BH CV STRESS PROTOCOL 1: NORMAL
BH CV STRESS RECOVERY BP: NORMAL MMHG
BH CV STRESS RECOVERY HR: 82 BPM
BH CV STRESS RECOVERY O2: 96 %
BH CV STRESS SPEED STAGE 1: 1.7
BH CV STRESS STAGE 1: 1
MAXIMAL PREDICTED HEART RATE: 151 BPM
PERCENT MAX PREDICTED HR: 90.73 %
SPECT HRT GATED+EF W RNC IV: 81 %
STRESS BASELINE BP: NORMAL MMHG
STRESS BASELINE HR: 80 BPM
STRESS O2 SAT REST: 96 %
STRESS PERCENT HR: 107 %
STRESS POST ESTIMATED WORKLOAD: 4.6 METS
STRESS POST EXERCISE DUR MIN: 4 MIN
STRESS POST EXERCISE DUR SEC: 0 SEC
STRESS POST O2 SAT PEAK: 96 %
STRESS POST PEAK BP: NORMAL MMHG
STRESS POST PEAK HR: 137 BPM
STRESS TARGET HR: 128 BPM

## 2025-01-03 PROCEDURE — 34310000005 TECHNETIUM SESTAMIBI: Performed by: INTERNAL MEDICINE

## 2025-01-03 PROCEDURE — 78452 HT MUSCLE IMAGE SPECT MULT: CPT

## 2025-01-03 PROCEDURE — 93017 CV STRESS TEST TRACING ONLY: CPT

## 2025-01-03 PROCEDURE — A9500 TC99M SESTAMIBI: HCPCS | Performed by: INTERNAL MEDICINE

## 2025-01-03 RX ADMIN — TECHNETIUM TC 99M SESTAMIBI 1 DOSE: 1 INJECTION INTRAVENOUS at 07:25

## 2025-01-03 RX ADMIN — TECHNETIUM TC 99M SESTAMIBI 1 DOSE: 1 INJECTION INTRAVENOUS at 09:15

## 2025-01-03 NOTE — TELEPHONE ENCOUNTER
Referring provider's office notified pathology returned as cancer and patient will be notified.

## 2025-01-03 NOTE — TELEPHONE ENCOUNTER
Patient notified of pathology results and recommendation. Verbalizes understanding. States having some discomfort, using analgesics with some relief. Denies signs and symptoms of infection.     Patient previously requested  Dr KAYY Buitrago. Patient notified of surgical consult appointment on 1/29/25 @ 1445 with arrival time of [address] [facility], [address]. Patient given office contact & location information.Told to bring photo ID, list of prescription & OTC medications, insurance information. May be accompanied by family member or friend for support. Patient verbalized understanding.     Reviewed what would be discussed at surgical consult visit, including detailed explanation of pathology report & imaging reports; treatment options & pros/cons, availability of breast nurse navigator. Patient encouraged to contact  BIS nurse with questions or concerns. Breast cancer information packet offered and accepted. Patient verbalized understanding. Patient information sent to breast cancer nurse navigators for evaluation.

## 2025-01-13 ENCOUNTER — HOSPITAL ENCOUNTER (OUTPATIENT)
Dept: CARDIOLOGY | Facility: HOSPITAL | Age: 70
Discharge: HOME | End: 2025-01-13
Admitting: INTERNAL MEDICINE
Payer: MEDICARE

## 2025-01-13 VITALS — HEIGHT: 61 IN | BODY MASS INDEX: 32.28 KG/M2 | WEIGHT: 171 LBS

## 2025-01-13 DIAGNOSIS — R07.2 PRECORDIAL PAIN: ICD-10-CM

## 2025-01-13 LAB
AV MEAN PRESS GRAD SYS DOP V1V2: 4.5 MMHG
AV VMAX SYS DOP: 145 CM/SEC
BH CV ECHO MEAS - AO MAX PG: 8.4 MMHG
BH CV ECHO MEAS - AO ROOT DIAM: 2.8 CM
BH CV ECHO MEAS - AO V2 VTI: 25.1 CM
BH CV ECHO MEAS - AVA(I,D): 2.24 CM2
BH CV ECHO MEAS - EDV(CUBED): 85.2 ML
BH CV ECHO MEAS - EDV(MOD-SP2): 49.9 ML
BH CV ECHO MEAS - EDV(MOD-SP4): 69.6 ML
BH CV ECHO MEAS - EF(MOD-SP2): 64.7 %
BH CV ECHO MEAS - EF(MOD-SP4): 56.6 %
BH CV ECHO MEAS - ESV(CUBED): 39.3 ML
BH CV ECHO MEAS - ESV(MOD-SP2): 17.6 ML
BH CV ECHO MEAS - ESV(MOD-SP4): 30.2 ML
BH CV ECHO MEAS - FS: 22.7 %
BH CV ECHO MEAS - IVS/LVPW: 1 CM
BH CV ECHO MEAS - IVSD: 1 CM
BH CV ECHO MEAS - LA DIMENSION: 3.2 CM
BH CV ECHO MEAS - LAT PEAK E' VEL: 7.4 CM/SEC
BH CV ECHO MEAS - LV DIASTOLIC VOL/BSA (35-75): 39.4 CM2
BH CV ECHO MEAS - LV MASS(C)D: 147.8 GRAMS
BH CV ECHO MEAS - LV MAX PG: 4.4 MMHG
BH CV ECHO MEAS - LV MEAN PG: 2 MMHG
BH CV ECHO MEAS - LV SYSTOLIC VOL/BSA (12-30): 17.1 CM2
BH CV ECHO MEAS - LV V1 MAX: 104.5 CM/SEC
BH CV ECHO MEAS - LV V1 VTI: 17.9 CM
BH CV ECHO MEAS - LVIDD: 4.4 CM
BH CV ECHO MEAS - LVIDS: 3.4 CM
BH CV ECHO MEAS - LVOT AREA: 3.1 CM2
BH CV ECHO MEAS - LVOT DIAM: 2 CM
BH CV ECHO MEAS - LVPWD: 1 CM
BH CV ECHO MEAS - MED PEAK E' VEL: 9.8 CM/SEC
BH CV ECHO MEAS - MR MAX PG: 20.3 MMHG
BH CV ECHO MEAS - MR MAX VEL: 225 CM/SEC
BH CV ECHO MEAS - MV A MAX VEL: 101 CM/SEC
BH CV ECHO MEAS - MV DEC SLOPE: 355 CM/SEC2
BH CV ECHO MEAS - MV DEC TIME: 0.19 SEC
BH CV ECHO MEAS - MV E MAX VEL: 74.4 CM/SEC
BH CV ECHO MEAS - MV E/A: 0.74
BH CV ECHO MEAS - MV MAX PG: 5.2 MMHG
BH CV ECHO MEAS - MV MEAN PG: 2 MMHG
BH CV ECHO MEAS - MV P1/2T: 67.7 MSEC
BH CV ECHO MEAS - MV V2 VTI: 22.4 CM
BH CV ECHO MEAS - MVA(P1/2T): 3.3 CM2
BH CV ECHO MEAS - MVA(VTI): 2.5 CM2
BH CV ECHO MEAS - PA ACC TIME: 0.08 SEC
BH CV ECHO MEAS - PA V2 MAX: 101 CM/SEC
BH CV ECHO MEAS - RAP SYSTOLE: 3 MMHG
BH CV ECHO MEAS - RVSP: 22 MMHG
BH CV ECHO MEAS - SV(LVOT): 56.1 ML
BH CV ECHO MEAS - SV(MOD-SP2): 32.3 ML
BH CV ECHO MEAS - SV(MOD-SP4): 39.4 ML
BH CV ECHO MEAS - SVI(LVOT): 31.7 ML/M2
BH CV ECHO MEAS - SVI(MOD-SP2): 18.3 ML/M2
BH CV ECHO MEAS - SVI(MOD-SP4): 22.3 ML/M2
BH CV ECHO MEAS - TAPSE (>1.6): 1.61 CM
BH CV ECHO MEAS - TR MAX PG: 19 MMHG
BH CV ECHO MEAS - TR MAX VEL: 218 CM/SEC
BH CV ECHO MEASUREMENTS AVERAGE E/E' RATIO: 8.65
BH CV XLRA - RV BASE: 3.1 CM
BH CV XLRA - RV LENGTH: 6.7 CM
BH CV XLRA - RV MID: 2.9 CM
BH CV XLRA - TDI S': 13.7 CM/SEC
LEFT ATRIUM VOLUME INDEX: 19.2 ML/M2
LV EF BIPLANE MOD: 61 %

## 2025-01-13 PROCEDURE — 93306 TTE W/DOPPLER COMPLETE: CPT | Performed by: INTERNAL MEDICINE

## 2025-01-13 PROCEDURE — 93306 TTE W/DOPPLER COMPLETE: CPT

## 2025-01-29 ENCOUNTER — HOSPITAL ENCOUNTER (OUTPATIENT)
Dept: PHYSICAL THERAPY | Facility: HOSPITAL | Age: 70
Setting detail: THERAPIES SERIES
Discharge: HOME | End: 2025-01-29
Payer: MEDICARE

## 2025-01-29 ENCOUNTER — PATIENT OUTREACH (OUTPATIENT)
Dept: OTHER | Facility: HOSPITAL | Age: 70
End: 2025-01-29
Payer: MEDICARE

## 2025-01-29 DIAGNOSIS — C50.912 MALIGNANT NEOPLASM OF LEFT FEMALE BREAST, UNSPECIFIED ESTROGEN RECEPTOR STATUS, UNSPECIFIED SITE OF BREAST: Primary | ICD-10-CM

## 2025-01-29 DIAGNOSIS — C50.212 MALIGNANT NEOPLASM OF UPPER-INNER QUADRANT OF LEFT BREAST IN FEMALE, ESTROGEN RECEPTOR POSITIVE: Primary | ICD-10-CM

## 2025-01-29 DIAGNOSIS — Z17.0 MALIGNANT NEOPLASM OF UPPER-INNER QUADRANT OF LEFT BREAST IN FEMALE, ESTROGEN RECEPTOR POSITIVE: Primary | ICD-10-CM

## 2025-01-29 NOTE — THERAPY DISCHARGE NOTE
Outpatient Physical Therapy Lymphedema Initial Evaluation & Discharge  [facility]     Patient Name: Funmilayo Cantu  : 1955  MRN: [id number]  Today's Date: 2025      Visit Date: 2025    Visit Dx:    ICD-10-CM ICD-9-CM   1. Malignant neoplasm of left female breast, unspecified estrogen receptor status, unspecified site of breast  C50.912 174.9       Patient Active Problem List   Diagnosis    Precordial pain    Essential hypertension    Fatigue    Hyperlipidemia    Mild obesity    Osteoarthritis    Osteopenia    Premature ventricular contractions    Degenerative arthritis of right knee    GERD without esophagitis    S/P total knee arthroplasty, right        Past Medical History:   Diagnosis Date    Fracture of wrist     GERD (gastroesophageal reflux disease)     Hip arthrosis 2021    Hyperlipidemia     Hypertension     Kidney stones     HX OF    Knee swelling     Low back strain     Mild obesity 2017    Neck strain ?    Nephrolithiasis     Osteoarthritis 2017    Osteopenia 2017    Premature ventricular contractions 2017    with appropriate suppression on stress echocardiography.    Tendinitis of knee ?    Maybe        Past Surgical History:   Procedure Laterality Date    CATARACT EXTRACTION Bilateral     CHOLECYSTECTOMY      COLONOSCOPY      DENTAL PROCEDURE      P[place] teeth extraction.    FOOT SURGERY      Remove ingrown toenail    KIDNEY STONE SURGERY      Lithotripsy x2.    KNEE ARTHROSCOPY Right     TONSILLECTOMY      TOTAL KNEE ARTHROPLASTY Right 2024    Procedure: TOTAL KNEE ARTHROPLASTY WITH CORI ROBOT - RIGHT;  Surgeon: Irvin Del Cid MD;  [facility];  Service: Robotics - Ortho;  Laterality: Right;        Patient History       Row Name 25 1600             History    Brief Description of Current Complaint BrCA presurgical evaluation  -CA         Pain     Pain at Best 0  -CA      Pain at Worst 0  -CA         Services     Prior Rehab/Home Health Experiences No  -CA      Are you currently receiving Home Health services No  -CA      Do you plan to receive Home Health services in the near future No  -CA         Daily Activities    Primary Language English  -CA      Are you able to read Yes  -CA      Are you able to write Yes  -CA      How does patient learn best? Pictures/Video;Demonstration;Reading;Listening  -CA      Pt Participated in POC and Goals Yes  -CA         Safety    Are you being hurt, hit, or frightened by anyone at home or in your life? No  -CA      Are you being neglected by a caregiver No  -CA                User Key  (r) = Recorded By, (t) = Taken By, (c) = Cosigned By      Initials Name Provider Type    CA Rita Harris, PT Physical Therapist                     Lymphedema       Row Name 01/29/25 1600             Subjective Pain    Able to rate subjective pain? yes  -CA      Pre-Treatment Pain Level 0  -CA      Post-Treatment Pain Level 0  -CA         Subjective    Subjective Comments Pt has been dx with L sided breast CA. She is currently planning a L lumpectomy with SLNB but is awaiting additional imaging before her final surgery plan is decided. She is active in her Faith and presents with her spouse who is supportive. Pt notes intermittent issues with arthritis.  -CA         Lymphedema Assessment    Lymphedema Classification LUE:;at risk/stage 0  -CA      Lymphedema Surgery Comments surgery is TBD  -CA         Posture/Observations    Alignment Options Forward head;Rounded shoulders  -CA      Forward Head Mild  -CA      Rounded Shoulders Mild  -CA         General ROM    RT Upper Ext Rt Shoulder ABduction;Rt Shoulder Flexion;Rt Shoulder External Rotation;Rt Shoulder Internal Rotation  -CA      LT Upper Ext Lt Shoulder Flexion;Lt Shoulder External Rotation;Lt Shoulder Internal Rotation;Lt Shoulder ABduction  -CA      GENERAL ROM COMMENTS WFL wrist/hand  -CA         Right Upper Ext    Rt Shoulder Abduction  AROM WFL  -CA      Rt Shoulder Flexion AROM WFL  -CA      Rt Shoulder External Rotation AROM WFL  -CA      Rt Shoulder Internal Rotation AROM WFL  -CA         Left Upper Ext    Lt Shoulder Abduction AROM WFL  -CA      Lt Shoulder Flexion AROM WFL  -CA      Lt Shoulder External Rotation AROM WFL  -CA      Lt Shoulder Internal Rotation AROM WFL  -CA         MMT (Manual Muscle Testing)    Rt Upper Ext Rt Shoulder Flexion;Rt Shoulder ABduction;Rt Shoulder Internal Rotation;Rt Shoulder External Rotation  -CA      Lt Upper Ext Lt Shoulder Flexion;Lt Shoulder ABduction;Lt Shoulder Internal Rotation;Lt Shoulder External Rotation  -CA         MMT Right Upper Ext    Rt Shoulder Flexion MMT, Gross Movement (4+/5) good plus  -CA      Rt Shoulder ABduction MMT, Gross Movement (4+/5) good plus  -CA      Rt Shoulder Internal Rotation MMT, Gross Movement (4+/5) good plus  -CA      Rt Shoulder External Rotation MMT, Gross Movement (4+/5) good plus  -CA         MMT Left Upper Ext    Lt Shoulder Flexion MMT, Gross Movement (4+/5) good plus  -CA      Lt Shoulder ABduction MMT, Gross Movement (4+/5) good plus  -CA      Lt Shoulder Internal Rotation MMT, Gross Movement (4+/5) good plus  -CA      Lt Shoulder External Rotation MMT, Gross Movement (4+/5) good plus  -CA         Hand  Strength     Strength Affected Side Bilateral  -CA          Strength Right    Right  Test 1 40  -CA       Strength Average Right 40  -CA          Strength Left    Left  Test 1 35  -CA       Strength Average Left 35  -CA         Lymphedema Edema Assessment    Edema Assessment Comment No edema present at this time.  -CA         Skin Changes/Observations    Location/Assessment Upper Extremity  -CA      Upper Extremity Conditions bilateral:;normal;intact  -CA      Upper Extremity Color/Pigment bilateral:;normal  -CA         Lymphedema Sensation    Lymphedema Sensation Reports RUE:;LUE:  -CA      Lymphedema Sensation Tests light touch   -CA      Lymphedema Light Touch RUE:;LUE:;WNL  -CA         L-Dex Bioimpedence Screening    L-Dex Measurement Extremity LUE  -CA      L-Dex Patient Position Standing  -CA      L-Dex UE Dominate Side Right  -CA      L-Dex UE At Risk Side Left  -CA      L-Dex UE Pre Surgical Value Yes  -CA      L-Dex UE Score 1.7  -CA      L-Dex UE Comment The patient had SOZO measurement which I reviewed today. The score is in normal limits, see scanned to EMR. Bioimpedance spectroscopy helps identify the onset of lymphedema in an arm or leg before patients experience noticeable swelling. Research has shown that the early detection of lymphedema using L-Dex combined with treatment can reduce progression to chronic lymphedema by 95% in breast cancer patients. Whenever possible, patients are tested for baseline L-Dex score before cancer treatment begins and then are reassessed during regular follow-up visits using the SOZO device. Otherwise, this can be started postoperatively and continued during regular follow-up visits. If the patient’s L-Dex score increases above normal levels, that is a sign that lymphedema is developing and a referral is made to occupational or physical therapy for further evaluation and early compression treatment. Lymphedema assessment with the SOZO L-Dex score is recommended to be done every 3 months for the first 3 years and then every 6 months for years 4 and 5 followed by annually afterwards.  -CA      Skeletal Muscle Mass (%) 21.3 %  -CA      Fat Mass (%) 40.4 %  -CA      Hy-dex -14.8  -CA                User Key  (r) = Recorded By, (t) = Taken By, (c) = Cosigned By      Initials Name Provider Type    Rita Blanchard PT Physical Therapist                    Therapy Education  Education Details: Pt educated on prehab evaluation assessments including bioimpedance. Pt was provided an HEP detailing information including lymphedema, risk reduction, and post op exercise.  Given: HEP, Symptoms/condition  management, Posture/body mechanics  Program: New  How Provided: Verbal, Written  Provided to: Patient (and spouse)  Level of Understanding: Verbalized     OP Exercises       Row Name 01/29/25 1600             Subjective    Subjective Comments Pt has been dx with L sided breast CA. She is currently planning a L lumpectomy with SLNB but is awaiting additional imaging before her final surgery plan is decided. She is active in her [employer] and presents with her spouse who is supportive. Pt notes intermittent issues with arthritis.  -CA         Subjective Pain    Able to rate subjective pain? yes  -CA      Pre-Treatment Pain Level 0  -CA      Post-Treatment Pain Level 0  -CA         Exercise 1    Exercise Name 1 Elbow flexion/extension  -CA      Additional Comments 3-4x/day, HEP level 1- post op day 1 to day 7  -CA         Exercise 2    Exercise Name 2 Fist/wrist circles  -CA      Reps 2 x10  -CA      Additional Comments 3-4x/day, HEP level 1- post op day 1 to day 7  -CA         Exercise 3    Exercise Name 3 Neck Flexion/extension/rotation/lateral flexion  -CA      Reps 3 x10  -CA      Additional Comments 3-4x/day, HEP level 1- post op day 1 to day 7  -CA         Exercise 4    Exercise Name 4 horizontal abduction with hands behind neck  -CA      Reps 4 x10  -CA      Time 4 5 seconds  -CA      Additional Comments 3-4x/day, HEP level 2- post op day 7 to day 14  -CA         Exercise 5    Exercise Name 5 lumbar trunk rotration  -CA      Reps 5 x10  -CA      Time 5 5 seconds  -CA      Additional Comments 3-4x/day, HEP level 2- post op day 7 to day 14  -CA         Exercise 6    Exercise Name 6 shoulder rolls back  -CA      Reps 6 x10  -CA      Time 6 5 seconds  -CA      Additional Comments 3-4x/day, HEP level 2- post op day 7 to day 14  -CA         Exercise 7    Exercise Name 7 horizontal abduction/adduction with elbows extended  -CA      Reps 7 x10  -CA      Time 7 5  -CA      Additional Comments 3-4x/day, HEP level 2- post op  day 7 to day 14  -CA         Exercise 8    Exercise Name 8 wall walks for shoulder flexion  -CA      Reps 8 x10  -CA      Additional Comments 3-4x/day, HEP level 3- post op day 14 until motion is returned  -CA         Exercise 9    Exercise Name 9 IR with hands behind back  -CA      Reps 9 x10  -CA      Additional Comments 3-4x/day, HEP level 3- post op day 14 until motion is returned  -CA         Exercise 10    Exercise Name 10 Horizontal abduction with hands behind head  -CA      Reps 10 x10  -CA      Additional Comments 3-4x/day, HEP level 3- post op day 14 until motion is returned  -CA         Exercise 11    Exercise Name 11 PNF D1 shoulder flexion  -CA      Reps 11 x10  -CA      Additional Comments 3-4x/day, HEP level 3- post op day 14 until motion is returned  -CA         Exercise 12    Exercise Name 12 Trunk stretch with shoulder abduction  -CA      Reps 12 x10  -CA      Time 12 5 seconds  -CA      Additional Comments 3-4x/day, HEP level 3- post op day 14 until motion is returned  -CA                User Key  (r) = Recorded By, (t) = Taken By, (c) = Cosigned By      Initials Name Provider Type    Rita Blanchard, PT Physical Therapist                     PT OP Goals       Row Name 01/29/25 1600          Long Term Goals    LTG Date to Achieve 01/29/25  -CA     LTG 1 Pt demonstrates awareness of post-operative movement restrictions and HEP to facilitate lymphatic regeneration and reduce the risk of seroma formation, axillary web syndrome, and lymphedema while ensuring shoulder joint mobility.  -CA     LTG 1 Progress Met  -CA     LTG 2 Pt demonstrates understanding of post-operative basic lymphedema precautions  -CA     LTG 2 Progress Met  -CA        Time Calculation    PT Goal Re-Cert Due Date 01/29/25  -CA               User Key  (r) = Recorded By, (t) = Taken By, (c) = Cosigned By      Initials Name Provider Type    Rita Blanchard, ANOOP Physical Therapist                     PT Assessment/Plan        Row Name 01/29/25 1600          PT Assessment    Assessment Comments This patient presents to PT pre-operatively for planned BrCA surgery scheduled in a couple weeks. Baseline ROM, postural, and bioimpedance measurements were taken today to be compared to measurements retaken 3-4 weeks post surgery. At that time, any reduced movement, decline in function, or postural issues will be addressed with skilled care and new goals will be established.  Personal risk factors for lymphedema post-operatively for the L upper extremity and trunk quadrant were also assessed today and basic lymphedema precautions were discussed. A more detailed discussion regarding personal lymphedema risk factors can take place post-operatively once the number of lymph nodes removed and the plan for further medical care is known.  -CA     Please refer to paper survey for additional self-reported information Yes  -CA     Rehab Potential Good  -CA     Patient/caregiver participated in establishment of treatment plan and goals Yes  -CA     Patient would benefit from skilled therapy intervention Yes  -CA        PT Plan    PT Frequency One time visit  -CA     Planned CPT's? PT EVAL MOD COMPLELITY: 36611;PT BIS XTRACELL FLUID ANALYSIS: 63431  -CA     PT Plan Comments This patient may return to PT 3-4 weeks post operatively for re-evaluation measurements to be compared to measurements taken today, at her pre-operative evaluation. In addition, she can be examined for possible post-BrCA surgery sequelae such as axillary web syndrome, scar adhesions, edema, worsened posture, scapular winging, pain, and reduced ROM and function. At that time, a future plan and goals will be established and skilled care continued if indicated. Currently, she has been provided with information before BrCA surgery in order to facilitate recovery and reduce the risk of post-operative sequelae.  -CA               User Key  (r) = Recorded By, (t) = Taken By, (c) = Cosigned  By      Initials Name Provider Type    Rita Blanchard, PT Physical Therapist                     Outcome Measure Options: Quick DASH, Timed Up and Go (TUG)  Quick DASH  Open a tight or new jar.: Mild Difficulty  Do heavy household chores (e.g., wash walls, wash floors): Mild Difficulty  Carry a shopping bag or briefcase: No Difficulty  Wash your back: Mild Difficulty  Use a knife to cut food: No Difficulty  Recreational activities in which you take some force or impact through your arm, should or hand (e.g. golf, hammering, tennis, etc.): No Difficulty  During the past week, to what extent has your arm, shoulder, or hand problem interfered with your normal social activites with family, friends, neighbors or groups?: Slightly  During the past week, were you limited in your work or other regular daily activities as a result of your arm, shoulder or hand problem?: Not limited at all  Arm, Shoulder, or hand pain: None  Tingling (pins and needles) in your arm, shoulder, or hand: Mild  During the past week, how much difficulty have you had sleeping because of the pain in your arm, shoulder or hand?: No difficulty  Number of Questions Answered: 11  Quick DASH Score: 11.36  Timed Up and Go (TUG)  TUG Test 1: 8.2 seconds  Timed Up and Go Comments: no deviation      Time Calculation:   Start Time: 1600  Stop Time: 1645  Time Calculation (min): 45 min  Untimed Charges  PT Eval/Re-eval Minutes: 45  Total Minutes  Untimed Charges Total Minutes: 45   Total Minutes: 45   Time calculation does not account for 15 minutes documentation time    Therapy Charges for Today       Code Description Service Date Service Provider Modifiers Qty    95184500153 HC PT EVAL MOD COMPLEXITY 4 1/29/2025 Rita Harris, PT GP 1    [id number] HC PT BIS XTRACELL FLUID ANALYSIS 1/29/2025 Rita Harris, PT  1            PT G-Codes  Outcome Measure Options: Quick DASH, Timed Up and Go (TUG)  Quick DASH Score: 11.36  TUG Test 1: 8.2 seconds             Rita Harris, PT  1/29/2025

## 2025-01-29 NOTE — SIGNIFICANT NOTE
Met patient and her  in consultation with Dr. DUMONT to discuss surgery for her recent breast cancer diagnosis. Dr. DUMONT reviewed the pathology of IG IDC T1 N0 M0 ER+ HI+ Her2- left breast Pt is a candidate for lumpectomy SLNB followed by radiation so breast MRI has been ordered. Genetics referral placed (STAT). NN took notes, provided resource and education materials, and reviewed all with patient and her . Bioimpedence eval completed by PT.      01/29/25 8860   Nurse Navigation   Current Status Active   Type of Visit New patient   Location of Visit [facility]   Visit Diagnosis Breast - malignant   Referral Source Health professional - outpatient   Treatments Surgery   Date of Diagnosis 01/02/25   Surgery - First Consult Appointment 01/29/25   Barriers to Care No Barriers Identified   Practical Needs Nurse Navigator Referral   Intervention Tasks Performed Education;Symptom management;Community resources;Cancer prevention/Screening;Supportive services referral   Supportive services referral Bioimpedance/Lymphedema;Genetics referral   Time Navigated Today (Min) 70   Total Time Navigated (Min) 70   Acuity Rating   Time spent with patient 3- greater than 45 minutes   Multimodality treatment coordination and education 2-  Arrange, transfer care, second opinion   Caregiver support 1- Family/significant other support available   Distress score 1- 0-3   Coordination of care  - appts 2- Multiple appts   Appt compliance 1- Compliant   ECOG/Karnofsky Score 1- ECOG -Less than or equal to 1,  Karnofsky 90 or greater   PHQ 9 score  1- <10 clinical   Referrals to support services 3- 2 or greater   Acuity score 15   Acuity level Medium acuity

## 2025-01-30 ENCOUNTER — TRANSCRIBE ORDERS (OUTPATIENT)
Facility: HOSPITAL | Age: 70
End: 2025-01-30
Payer: MEDICARE

## 2025-01-30 ENCOUNTER — CLINICAL SUPPORT (OUTPATIENT)
Facility: HOSPITAL | Age: 70
End: 2025-01-30
Payer: MEDICARE

## 2025-01-30 ENCOUNTER — LAB (OUTPATIENT)
Facility: HOSPITAL | Age: 70
End: 2025-01-30
Payer: MEDICARE

## 2025-01-30 ENCOUNTER — TRANSCRIBE ORDERS (OUTPATIENT)
Dept: PHYSICAL THERAPY | Facility: HOSPITAL | Age: 70
End: 2025-01-30
Payer: MEDICARE

## 2025-01-30 DIAGNOSIS — Z13.79 GENETIC TESTING: Primary | ICD-10-CM

## 2025-01-30 DIAGNOSIS — Z17.0 MALIGNANT NEOPLASM OF LEFT BREAST IN FEMALE, ESTROGEN RECEPTOR POSITIVE, UNSPECIFIED SITE OF BREAST: ICD-10-CM

## 2025-01-30 DIAGNOSIS — Z80.41 FAMILY HISTORY OF MALIGNANT NEOPLASM OF OVARY: ICD-10-CM

## 2025-01-30 DIAGNOSIS — C50.912 MALIGNANT NEOPLASM OF LEFT FEMALE BREAST, UNSPECIFIED ESTROGEN RECEPTOR STATUS, UNSPECIFIED SITE OF BREAST: Primary | ICD-10-CM

## 2025-01-30 DIAGNOSIS — C50.912 MALIGNANT NEOPLASM OF LEFT BREAST IN FEMALE, ESTROGEN RECEPTOR POSITIVE, UNSPECIFIED SITE OF BREAST: ICD-10-CM

## 2025-01-30 DIAGNOSIS — Z80.0 FAMILY HISTORY OF CANCER OF MOUTH: ICD-10-CM

## 2025-01-30 PROCEDURE — 96041 GENETIC COUNSELING SVC EA 30: CPT | Performed by: GENETIC COUNSELOR, MS

## 2025-01-30 NOTE — PROGRESS NOTES
Funmilayo Cantu, a 69 y.o. female, was referred for genetic counseling due to a personal history of breast cancer. She was recently diagnosed with a left breast cancer at age 69 and is in the process of making a surgical decision. Ms. Cantu retains her uterus and ovaries. She does not report a personal history of colon polyps. She was interested in discussing her risk for a hereditary cancer syndrome. Ms. Cantu was interested in pursuing a multigene panel, and therefore the CancerNext-Expanded panel was ordered through [facility] which analyzes BRCA1/2 and 74 additional genes associated with an increased cancer risk.     FAMILY HISTORY:  Sister:   Ovarian cancer, 49  Mother:  Non Hodgkin Lymphoma, 60s  Pat. Uncle:  Mesothelioma, 60s (occupational asbestos exposure)  Pat. Grandfather: Mouth cancer, 75  Daughter:  Recent genetic testing that identified a variant of uncertain significance (VUS) in MET gene -- copy of report not provided    We do not have medical records confirming the diagnoses in Ms. Cantu's family.    RISK ASSESSMENT: Ms. Cantu's personal history of breast cancer led to concern regarding a hereditary cancer syndrome.  She meets NCCN guidelines criteria for BRCA1/2 testing based on her personal history of breast cancer and family history of ovarian cancer in a first-degree relative. The standard approach to genetic testing is through a multigene panel.     GENETIC COUNSELING:  We reviewed the family history information in detail.  Cases of cancer follow three general patterns: sporadic, familial, and hereditary.  While most cancer is sporadic, some cases appear to occur in family clusters.  These cases are said to be familial and account for 10-20% of certain cancer cases.  Familial cases may be due to a combination of shared genes and environmental factors among family members.  In even fewer families (~10%), the cancer is said to be inherited, and the genes responsible for the cancer are known.       Family histories typical of hereditary cancer syndromes usually include multiple first- and second-degree relatives diagnosed with cancer types that define a syndrome.  These cases tend to be diagnosed at younger-than-expected ages and can be bilateral or multifocal.  The cancer in these families follows an autosomal dominant inheritance pattern, which indicates the likely presence of a mutation in a cancer susceptibility gene.  Children and siblings of an individual believed to carry this mutation have a 50% chance of inheriting that mutation, thereby inheriting the increased risk to develop cancer.  These mutations can be passed down from the maternal or the paternal lineage.    Hereditary breast cancer accounts for 5-10% of all cases of breast cancer.  A significant proportion of hereditary breast and ovarian cancer can be attributed to mutations in the BRCA1 and BRCA2 genes.  Mutations in these genes confer an increased risk for breast cancer, ovarian cancer, male breast cancer, prostate cancer, and pancreatic cancer. Women with a BRCA1 or BRCA2 mutation who have already been diagnosed with breast cancer have a 40-60% lifetime risk of a second breast cancer. Women with a BRCA1 or BRCA2 mutation have up to a 44% risk of ovarian cancer.      The standard approach to genetic testing is via a multigene panel.  Genes included on these panels have varying degrees of risk associated, and management and screening guidelines vary based on the specific gene.  Hereditary cancer syndromes can demonstrate incomplete penetrance and variable expression within families. There are genes that are evaluated that have been more recently described, and there may be less data regarding the risks and therefore may not have established management guidelines at this time. We reviewed that in some cases, the identification of a genetic mutation may impact treatment options for some types of cancer. We discussed the possibility of  results that are unexpected based on family history. We discussed these limitations at length. Based on Ms. Cantu's personal history and her desire to get more information regarding her personal risks and risks for her family, she opted to pursue testing through a panel evaluating several other genes known to increase the risk for cancer.     GENETIC TESTING:  The risks, benefits and limitations of genetic testing and implications for clinical management following testing were reviewed. DNA test results can influence decisions regarding screening and prevention.  Genetic testing can have significant psychological implications for both individuals and families. Also discussed was the possibility of employment and insurance discrimination based on genetic test results and the federal and states laws that are in place to prevent this (EMILY), as well as the limitations of these laws.         We discussed multigene panel testing, which would involve testing several genes associated with an increased cancer risk. The benefits and limitations of genetic testing were discussed and Ms. Cantu decided to pursue testing of the genes via the panel. The implications of a positive or negative test result were discussed.  We discussed the possibility that, in some cases, genetic test results may be ambiguous due to the identification of a genetic variant of uncertain significance (VUS). These variants may or may not be associated with an increased cancer risk. With multigene panel testing, it is not uncommon for a VUS to be identified.  If a VUS is identified, testing family members is not recommended and screening recommendations are made based on the family history.  The laboratories that perform genetic testing work to reclassify the VUS and send out an amended report if and when a VUS is reclassified.  The majority of variant findings are ultimately reclassified to a negative result. Given her personal and family history, a  negative test result does not eliminate all cancer risk to her relatives, although the risk would not be as high as it would with positive genetic testing.     PLAN:  Genetic testing via the BRCAplus panel and CancerNext-Expanded panel through [facility] was ordered. A blood sample was collected today 1/30/2025. Results from the high/moderate risk breast cancer genes (BRCAplus panel) are expected in 7-10 days, and results from the remainder of the panel are expected in 2-3 weeks.  Ms. Cantu is welcome to contact us in the meantime with any questions she may have at [phone].       Mehnaz Foster MS, [facility] [place], [facility]  Licensed Certified Genetic Counselor        Total time spent caring for the patient today was 40 minutes. This time includes chart review, time spent during the visit, and time spent after the visit on documentation and follow-up.

## 2025-02-06 ENCOUNTER — HOSPITAL ENCOUNTER (OUTPATIENT)
Facility: HOSPITAL | Age: 70
Discharge: HOME | End: 2025-02-06
Admitting: SURGERY
Payer: MEDICARE

## 2025-02-06 DIAGNOSIS — C50.212 CARCINOMA OF UPPER-INNER QUADRANT OF FEMALE BREAST, LEFT: ICD-10-CM

## 2025-02-06 PROCEDURE — C8937 CAD BREAST MRI: HCPCS

## 2025-02-06 PROCEDURE — C8908 MRI W/O FOL W/CONT, BREAST,: HCPCS

## 2025-02-06 PROCEDURE — A9577 INJ MULTIHANCE: HCPCS | Performed by: SURGERY

## 2025-02-06 PROCEDURE — 25510000002 GADOBENATE DIMEGLUMINE 529 MG/ML SOLUTION: Performed by: SURGERY

## 2025-02-06 RX ADMIN — GADOBENATE DIMEGLUMINE 16 ML: 529 INJECTION, SOLUTION INTRAVENOUS at 15:09

## 2025-02-07 ENCOUNTER — TELEPHONE (OUTPATIENT)
Dept: MRI IMAGING | Facility: HOSPITAL | Age: 70
End: 2025-02-07
Payer: MEDICARE

## 2025-02-07 NOTE — TELEPHONE ENCOUNTER
Patient was recommended from her [facility] for a right 2nd look ultrasound. Scheduled for 2/12/2025 at 9:00 with 8:45 arrival at [address]. No BT. Encouraged to call with any further questions.

## 2025-02-10 ENCOUNTER — DOCUMENTATION (OUTPATIENT)
Dept: GENETICS | Facility: HOSPITAL | Age: 70
End: 2025-02-10
Payer: MEDICARE

## 2025-02-10 ENCOUNTER — TELEPHONE (OUTPATIENT)
Dept: GENETICS | Facility: HOSPITAL | Age: 70
End: 2025-02-10
Payer: MEDICARE

## 2025-02-11 NOTE — PROGRESS NOTES
Patient contacted to discuss results of genetic testing. The high/moderate breast cancer risk portion of the panel was resulted out first in order to expedite surgical decision making, and testing was negative for mutations in HERLINDA, BARD1, BRCA1, BRCA2, CDH1, CHEK2, NF1, PALB2, PTEN, RAD51C, RAD51D, STK11 and TP53. The remainder of the CancerNext-Expanded panel, including lower risk genes, non-breast cancer related genes, and RNA analysis is still pending, and the patient will be contacted once those results are available. Full summary note, pedigree, and results will be sent to patient, referring provider, and treating physicians once full panel results are back.      Mehnaz Foster MS, [facility] [place]  Licensed Certified Genetic Counselor      Cc:  Kala Buitrago MD

## 2025-02-12 ENCOUNTER — HOSPITAL ENCOUNTER (OUTPATIENT)
Dept: ULTRASOUND IMAGING | Facility: HOSPITAL | Age: 70
Discharge: HOME | End: 2025-02-12
Payer: MEDICARE

## 2025-02-12 ENCOUNTER — HOSPITAL ENCOUNTER (OUTPATIENT)
Dept: MAMMOGRAPHY | Facility: HOSPITAL | Age: 70
Discharge: HOME | End: 2025-02-12
Payer: MEDICARE

## 2025-02-12 DIAGNOSIS — R92.8 ABNORMAL MRI, BREAST: ICD-10-CM

## 2025-02-12 DIAGNOSIS — C50.212 CARCINOMA OF UPPER-INNER QUADRANT OF FEMALE BREAST, LEFT: ICD-10-CM

## 2025-02-12 PROCEDURE — 25010000002 LIDOCAINE 1 % SOLUTION: Performed by: RADIOLOGY

## 2025-02-12 PROCEDURE — 77065 DX MAMMO INCL CAD UNI: CPT | Performed by: RADIOLOGY

## 2025-02-12 PROCEDURE — 19083 BX BREAST 1ST LESION US IMAG: CPT | Performed by: RADIOLOGY

## 2025-02-12 PROCEDURE — A4648 IMPLANTABLE TISSUE MARKER: HCPCS

## 2025-02-12 PROCEDURE — 25010000002 LIDOCAINE 1% - EPINEPHRINE 1:100000 1 %-1:100000 SOLUTION: Performed by: RADIOLOGY

## 2025-02-12 PROCEDURE — 88305 TISSUE EXAM BY PATHOLOGIST: CPT | Performed by: RADIOLOGY

## 2025-02-12 RX ORDER — LIDOCAINE HYDROCHLORIDE AND EPINEPHRINE 10; 10 MG/ML; UG/ML
10 INJECTION, SOLUTION INFILTRATION; PERINEURAL ONCE
Status: COMPLETED | OUTPATIENT
Start: 2025-02-12 | End: 2025-02-12

## 2025-02-12 RX ORDER — LIDOCAINE HYDROCHLORIDE 10 MG/ML
5 INJECTION, SOLUTION INFILTRATION; PERINEURAL ONCE
Status: COMPLETED | OUTPATIENT
Start: 2025-02-12 | End: 2025-02-12

## 2025-02-12 RX ADMIN — LIDOCAINE HYDROCHLORIDE,EPINEPHRINE BITARTRATE 10 ML: 10; .01 INJECTION, SOLUTION INFILTRATION; PERINEURAL at 10:14

## 2025-02-12 RX ADMIN — Medication 5 ML: at 10:12

## 2025-02-13 ENCOUNTER — TELEPHONE (OUTPATIENT)
Dept: MAMMOGRAPHY | Facility: HOSPITAL | Age: 70
End: 2025-02-13
Payer: MEDICARE

## 2025-02-13 ENCOUNTER — DOCUMENTATION (OUTPATIENT)
Dept: GENETICS | Facility: HOSPITAL | Age: 70
End: 2025-02-13
Payer: MEDICARE

## 2025-02-13 LAB
CYTO UR: NORMAL
LAB AP CASE REPORT: NORMAL
LAB AP CLINICAL INFORMATION: NORMAL
PATH REPORT.FINAL DX SPEC: NORMAL
PATH REPORT.GROSS SPEC: NORMAL

## 2025-02-13 NOTE — PROGRESS NOTES
Funmilayo Cantu, a 69 y.o. female, was referred for genetic counseling due to a personal history of breast cancer. She was recently diagnosed with a left breast cancer at age 69 and is in the process of making a surgical decision. Ms. Cantu retains her uterus and ovaries. She does not report a personal history of colon polyps. She was interested in discussing her risk for a hereditary cancer syndrome. Ms. Cantu was interested in pursuing a multigene panel, and therefore the CancerNext-Expanded panel was ordered through [facility] which analyzes BRCA1/2 and 74 additional genes associated with an increased cancer risk. The genes on this panel include AIP, ALK, APC, HERLINDA, AXIN2, BAP1, BARD1, BMPR1A, BRCA1, BRCA2, BRIP1, CDC73, CDH1, CDK4, CDKN1B, CDKN2A, CEBPA, CHEK2, CTNNA1, DDX41, DICER1, EGFR, EPCAM, ETV6, FH, FLCN, GATA2, GREM1, HOXB13, KIT, LZTR1, MAX, MBD4, MEN1, MET, MITF, MLH1, MSH2, MSH3, MSH6, MUTYH, NF1, NF2, NTHL1, PALB2, PDGFRA, PHOX2B, PMS2, POLD1, POLE, POT1, OOMXS7D, PTCH1, PTEN, RAD51C, RAD51D, RB1, RET, RUNX1, SDHA, SDHAF2,  SDHB, SDHC, SDHD, SMAD4, SMARCA4, SMARCB1, SMARCE1, STK11, SUFU, VNRT465, TP53, TSC1, TSC2, VHL, and WT1. Genetic testing was negative for known pathogenic (harmful) mutations in BRCA1/2 and 74 additional genes on this panel.  While no known pathogenic mutations were identified, a variant of uncertain significance (VUS) was identified in the MET gene.   VUSs are differences in DNA that may or may not affect the function of the gene. VUSs are frequently reported with multigene panel testing, given the number of genes being evaluated and the presence of genetic variation in the population.  The classification of a variant to either a benign genetic variant or pathogenic mutation depends on a number of factors.  The majority (estimated to be >90%) of VUSs are eventually reclassified as benign gene changes. It is not recommended that any unaffected relatives are tested for this VUS  at this time, and this result is not considered clinically actionable at this time.  These results were discussed with Ms. Cantu by telephone on 2/13/2025.     FAMILY HISTORY:  Sister:                          Ovarian cancer, 49  Mother:                        Non Hodgkin Lymphoma, 60s  Pat. Uncle:                  Mesothelioma, 60s (occupational asbestos exposure)  Pat. Grandfather: Mouth cancer, 75  Daughter:                    Recent genetic testing that identified a variant of uncertain significance (VUS) in MET gene -- copy of report not provided     We do not have medical records confirming the diagnoses in Ms. Cantu's family.     RISK ASSESSMENT: Ms. Cantu's personal history of breast cancer led to concern regarding a hereditary cancer syndrome.  She meets NCCN guidelines criteria for BRCA1/2 testing based on her personal history of breast cancer and family history of ovarian cancer in a first-degree relative. The standard approach to genetic testing is through a multigene panel.      GENETIC COUNSELING:  We reviewed the family history information in detail.  Cases of cancer follow three general patterns: sporadic, familial, and hereditary.  While most cancer is sporadic, some cases appear to occur in family clusters.  These cases are said to be familial and account for 10-20% of certain cancer cases.  Familial cases may be due to a combination of shared genes and environmental factors among family members.  In even fewer families (~10%), the cancer is said to be inherited, and the genes responsible for the cancer are known.       Family histories typical of hereditary cancer syndromes usually include multiple first- and second-degree relatives diagnosed with cancer types that define a syndrome.  These cases tend to be diagnosed at younger-than-expected ages and can be bilateral or multifocal.  The cancer in these families follows an autosomal dominant inheritance pattern, which indicates the likely presence of  a mutation in a cancer susceptibility gene.  Children and siblings of an individual believed to carry this mutation have a 50% chance of inheriting that mutation, thereby inheriting the increased risk to develop cancer.  These mutations can be passed down from the maternal or the paternal lineage.     Hereditary breast cancer accounts for 5-10% of all cases of breast cancer.  A significant proportion of hereditary breast and ovarian cancer can be attributed to mutations in the BRCA1 and BRCA2 genes.  Mutations in these genes confer an increased risk for breast cancer, ovarian cancer, male breast cancer, prostate cancer, and pancreatic cancer. Women with a BRCA1 or BRCA2 mutation who have already been diagnosed with breast cancer have a 40-60% lifetime risk of a second breast cancer. Women with a BRCA1 or BRCA2 mutation have up to a 44% risk of ovarian cancer.       The standard approach to genetic testing is via a multigene panel.  Genes included on these panels have varying degrees of risk associated, and management and screening guidelines vary based on the specific gene.  Hereditary cancer syndromes can demonstrate incomplete penetrance and variable expression within families. There are genes that are evaluated that have been more recently described, and there may be less data regarding the risks and therefore may not have established management guidelines at this time. We reviewed that in some cases, the identification of a genetic mutation may impact treatment options for some types of cancer. We discussed the possibility of results that are unexpected based on family history. We discussed these limitations at length. Based on Ms. Cantu's personal history and her desire to get more information regarding her personal risks and risks for her family, she opted to pursue testing through a panel evaluating several other genes known to increase the risk for cancer.      GENETIC TESTING:  The risks, benefits and  limitations of genetic testing and implications for clinical management following testing were reviewed. DNA test results can influence decisions regarding screening and prevention.  Genetic testing can have significant psychological implications for both individuals and families. Also discussed was the possibility of employment and insurance discrimination based on genetic test results and the federal and states laws that are in place to prevent this (EMILY), as well as the limitations of these laws.         We discussed multigene panel testing, which would involve testing several genes associated with an increased cancer risk. The benefits and limitations of genetic testing were discussed and Ms. Cantu decided to pursue testing of the genes via the panel. The implications of a positive or negative test result were discussed.  We discussed the possibility that, in some cases, genetic test results may be ambiguous due to the identification of a genetic variant of uncertain significance (VUS). These variants may or may not be associated with an increased cancer risk. With multigene panel testing, it is not uncommon for a VUS to be identified.  If a VUS is identified, testing family members is not recommended and screening recommendations are made based on the family history.  The laboratories that perform genetic testing work to reclassify the VUS and send out an amended report if and when a VUS is reclassified.  The majority of variant findings are ultimately reclassified to a negative result. Given her personal and family history, a negative test result does not eliminate all cancer risk to her relatives, although the risk would not be as high as it would with positive genetic testing.     TEST RESULTS:  Genetic testing was negative for known pathogenic mutations by sequencing, rearrangement testing, and RNA analysis for the genes on the CancerNext-Expanded panel.    A variant of uncertain significance (VUS) was  identified in the MET gene.  A VUS is a finding that may or may not impact the function of the gene.  VUSs are not clinically actionable findings, and therefore this result does not impact management at this time.  The majority of VUSs are ultimately reclassified to benign changes.  The identification of a VUS is common in multigene panel testing, given the number of genes being evaluated.  If this VUS is ever reclassified, a new report will be issued by the laboratory and released directly to the ordering physician and our office, and the patient would be contacted.  This assessment is based on the information provided at the time of the consultation.    CLINICAL MANAGEMENT GUIDELINES: Ms. Cantu's surveillance and management should be determined by her oncology team. Despite the genetic test results that were negative for known pathogenic mutations, Ms. Cantu's female relatives may have a somewhat increased lifetime risk for breast cancer based on family history. Relatives may have a personalized risk assessment performed to quantify their breast cancer risk using a family history-based risk model, such as the Tyrer-Cuzick model. Surveillance for individuals with a high lifetime risk of breast cancer (>20%), based on NCCN guidelines, would consist of semi-annual clinical breast exams and monthly self-breast exams starting by age 18 and annual mammography starting 10 years younger than the earliest diagnosis in the family, or age 40, whichever is earliest.  According to an American Cancer Society expert panel and NCCN guidelines, annual breast MRI should be offered to women whose lifetime risk of breast cancer is 20-25 percent or more, typically beginning at the same age as mammography.    PLAN:  Genetic counseling remains available to Ms. Cantu, and she is welcome to contact us with any questions she may have at [phone].         Mehnaz Foster, MS, [facility] [place], [facility]  Licensed Certified Genetic  Counselor      Cc:  Kala Buitrago MD

## 2025-02-13 NOTE — TELEPHONE ENCOUNTER
Patient notified of pathology results and recommendation. Verbalizes understanding. States having some discomfort, using analgesics with some relief.. Denies signs and symptoms of infection.     Patient established with Dr KAYY Buitrago. Patient will be contacted directly by surgeon's office concerning follow-up. She was encouraged to call back with any additional questions.

## 2025-02-17 ENCOUNTER — TRANSCRIBE ORDERS (OUTPATIENT)
Dept: ADMINISTRATIVE | Facility: HOSPITAL | Age: 70
End: 2025-02-17
Payer: MEDICARE

## 2025-02-17 DIAGNOSIS — C50.212 MALIGNANT NEOPLASM OF UPPER-INNER QUADRANT OF LEFT FEMALE BREAST, UNSPECIFIED ESTROGEN RECEPTOR STATUS: Primary | ICD-10-CM

## 2025-02-19 ENCOUNTER — PRE-ADMISSION TESTING (OUTPATIENT)
Dept: PREADMISSION TESTING | Facility: HOSPITAL | Age: 70
End: 2025-02-19
Payer: MEDICARE

## 2025-02-19 LAB
ALBUMIN SERPL-MCNC: 4 G/DL (ref 3.5–5.2)
ALBUMIN/GLOB SERPL: 1.5 G/DL
ALP SERPL-CCNC: 94 U/L (ref 39–117)
ALT SERPL W P-5'-P-CCNC: 16 U/L (ref 1–33)
ANION GAP SERPL CALCULATED.3IONS-SCNC: 8 MMOL/L (ref 5–15)
AST SERPL-CCNC: 18 U/L (ref 1–32)
BILIRUB SERPL-MCNC: 0.4 MG/DL (ref 0–1.2)
BUN SERPL-MCNC: 16 MG/DL (ref 8–23)
BUN/CREAT SERPL: 15.5 (ref 7–25)
CALCIUM SPEC-SCNC: 9.2 MG/DL (ref 8.6–10.5)
CHLORIDE SERPL-SCNC: 106 MMOL/L (ref 98–107)
CO2 SERPL-SCNC: 27 MMOL/L (ref 22–29)
CREAT SERPL-MCNC: 1.03 MG/DL (ref 0.57–1)
DEPRECATED RDW RBC AUTO: 46.4 FL (ref 37–54)
EGFRCR SERPLBLD CKD-EPI 2021: 59 ML/MIN/1.73
ERYTHROCYTE [DISTWIDTH] IN BLOOD BY AUTOMATED COUNT: 12.4 % (ref 12.3–15.4)
GLOBULIN UR ELPH-MCNC: 2.6 GM/DL
GLUCOSE SERPL-MCNC: 118 MG/DL (ref 65–99)
HCT VFR BLD AUTO: 44.1 % (ref 34–46.6)
HGB BLD-MCNC: 14.2 G/DL (ref 12–15.9)
MCH RBC QN AUTO: 33 PG (ref 26.6–33)
MCHC RBC AUTO-ENTMCNC: 32.2 G/DL (ref 31.5–35.7)
MCV RBC AUTO: 102.6 FL (ref 79–97)
PLATELET # BLD AUTO: 275 10*3/MM3 (ref 140–450)
PMV BLD AUTO: 8.8 FL (ref 6–12)
POTASSIUM SERPL-SCNC: 4.8 MMOL/L (ref 3.5–5.2)
PROT SERPL-MCNC: 6.6 G/DL (ref 6–8.5)
RBC # BLD AUTO: 4.3 10*6/MM3 (ref 3.77–5.28)
SODIUM SERPL-SCNC: 141 MMOL/L (ref 136–145)
WBC NRBC COR # BLD AUTO: 8.12 10*3/MM3 (ref 3.4–10.8)

## 2025-02-19 PROCEDURE — 85027 COMPLETE CBC AUTOMATED: CPT

## 2025-02-19 PROCEDURE — 36415 COLL VENOUS BLD VENIPUNCTURE: CPT

## 2025-02-19 PROCEDURE — 80053 COMPREHEN METABOLIC PANEL: CPT

## 2025-02-19 NOTE — PAT
An arrival time for procedure was not provided during PAT visit. If patient had any questions or concerns about their arrival time, they were instructed to contact their surgeon/physician.  Additionally, if the patient referred to an arrival time that was acquired from their my chart account, patient was encouraged to verify that time with their surgeon/physician. Arrival times are NOT provided in Pre Admission Testing Department.    Patient viewed general PAT education video as instructed in their preoperative information received from their surgeon.  Patient stated the general PAT education video was viewed in its entirety and survey completed.  Copies of PAT general education handouts (Incentive Spirometry, Meds to Beds Program, Patient Belongings, Pre-op skin preparation instructions, Blood Glucose testing, Visitor policy, Surgery FAQ, Code H) distributed to patient if not printed. Education related to the PAT pass and skin preparation for surgery (if applicable) completed in PAT as a reinforcement to PAT education video. Patient instructed to return PAT pass provided today as well as completed skin preparation sheet (if applicable) on the day of procedure.     Additionally if patient had not viewed video yet but intended to view it at home or in our waiting area, then referred them to the handout with QR code/link provided during PAT visit.  Encouraged patient/family to read PAT general education handouts thoroughly and notify PAT staff with any questions or concerns. Patient verbalized understanding of all information and priority content.    CHART FAXED TO [facility]

## 2025-02-26 ENCOUNTER — OFFICE VISIT (OUTPATIENT)
Dept: ORTHOPEDIC SURGERY | Facility: CLINIC | Age: 70
End: 2025-02-26
Payer: MEDICARE

## 2025-02-26 VITALS
DIASTOLIC BLOOD PRESSURE: 98 MMHG | WEIGHT: 172 LBS | HEIGHT: 61 IN | BODY MASS INDEX: 32.47 KG/M2 | SYSTOLIC BLOOD PRESSURE: 154 MMHG

## 2025-02-26 DIAGNOSIS — Z09 POSTOPERATIVE EXAMINATION: ICD-10-CM

## 2025-02-26 DIAGNOSIS — Z96.651 S/P TOTAL KNEE ARTHROPLASTY, RIGHT: Primary | ICD-10-CM

## 2025-02-26 NOTE — PROGRESS NOTES
[facility] Note    Subjective     Chief Complaint   Patient presents with    Follow-up     9 month follow up - 1 year S/P Total Knee Arthroplasty With Cori Robot - Right (DOS: 2/6/24)        HPI    It has been 9  month(s) since Ms. Cantu's last visit. She returns to clinic today for follow-up of right knee arthroplasty. The issue has been ongoing for 1 year(s). She rates her pain a 2/10 on the pain scale. Previous/current treatments: cane/walker, NSAIDS, and physical therapy. Current symptoms: swelling and stiffness. The pain is worse with  kneeling ; does not need treatment to improve the pain. Overall, she is doing better.  90% improvement compared to her preoperative symptoms.  Fully ambulatory without external aids.      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    Nephrolithiasis     Osteoarthritis 01/17/2017    Osteopenia 01/17/2017    Premature ventricular contractions 01/17/2017    with appropriate suppression on stress echocardiography.    Tendinitis of knee ?    Maybe      Past Surgical History:   Procedure Laterality Date    CATARACT EXTRACTION Bilateral     CHOLECYSTECTOMY      COLONOSCOPY      DENTAL PROCEDURE      [place] teeth extraction.    FOOT SURGERY  2023    Remove ingrown toenail    KIDNEY STONE  SURGERY      Lithotripsy x2.    KNEE ARTHROSCOPY Right     TONSILLECTOMY      TOTAL KNEE ARTHROPLASTY Right 2024    Procedure: TOTAL KNEE ARTHROPLASTY WITH CORI ROBOT - RIGHT;  Surgeon: Irvin Del Cid MD;  Location: [facility];  Service: Robotics - Ortho;  Laterality: Right;      Family History   Problem Relation Age of Onset    Ovarian cancer Sister 49    Cancer Sister         Ovarian    Breast cancer Other 30    Heart disease Mother     Cancer Mother         Non-Hodgkin’s Lymphoma    Heart attack Father 70    Hyperlipidemia Brother     Diabetes Brother     Heart attack Maternal Grandmother     Heart attack Maternal Grandfather     Cancer Paternal Grandfather         Mouth    Heart disease Paternal Grandmother      Social History     Socioeconomic History    Marital status:    Tobacco Use    Smoking status: Former     Current packs/day: 0.00     Average packs/day: 0.5 packs/day for 6.7 years (3.3 ttl pk-yrs)     Types: Cigarettes     Start date: 9/10/1969     Quit date: 1976     Years since quittin.8    Smokeless tobacco: Never    Tobacco comments:     High school + one year college   Vaping Use    Vaping status: Never Used   Substance and Sexual Activity    Alcohol use: Yes     Alcohol/week: 2.0 standard drinks of alcohol     Types: 2 Drinks containing 0.5 oz of alcohol per week     Comment: Ulcer. 2 COCKTAILS IN THE EVENING    Drug use: No    Sexual activity: Yes     Partners: Male     Birth control/protection: Post-menopausal      Current Outpatient Medications on File Prior to Visit   Medication Sig Dispense Refill    amLODIPine (NORVASC) 5 MG tablet Take 1 tablet by mouth Daily.      aspirin 81 MG EC tablet Take 1 tablet by mouth Daily.      atorvastatin (LIPITOR) 10 MG tablet Take 1 tablet by mouth every night at bedtime.      cholecalciferol (VITAMIN D3) 1000 units tablet Take 1 tablet by mouth Daily.      famotidine (PEPCID) 40 MG tablet       losartan (COZAAR) 50 MG tablet Take 1  tablet by mouth Daily.      Restasis 0.05 % ophthalmic emulsion       [DISCONTINUED] dicyclomine (BENTYL) 20 MG tablet As Needed.       No current facility-administered medications on file prior to visit.      No Known Allergies     Review of Systems   Constitutional:  Negative for activity change, appetite change, chills, diaphoresis, fatigue, fever and unexpected weight change.   HENT:  Negative for congestion, dental problem, drooling, ear discharge, ear pain, facial swelling, hearing loss, mouth sores, nosebleeds, postnasal drip, rhinorrhea, sinus pressure, sneezing, sore throat, tinnitus, trouble swallowing and voice change.    Eyes:  Negative for photophobia, pain, discharge, redness, itching and visual disturbance.   Respiratory:  Negative for apnea, cough, choking, chest tightness, shortness of breath, wheezing and stridor.    Cardiovascular:  Negative for chest pain, palpitations and leg swelling.   Gastrointestinal:  Negative for abdominal distention, abdominal pain, anal bleeding, blood in stool, constipation, diarrhea, nausea, rectal pain and vomiting.   Endocrine: Negative for cold intolerance, heat intolerance, polydipsia, polyphagia and polyuria.   Genitourinary:  Negative for decreased urine volume, difficulty urinating, dysuria, enuresis, flank pain, frequency, genital sores, hematuria and urgency.   Musculoskeletal:  Positive for arthralgias. Negative for back pain, gait problem, joint swelling, myalgias, neck pain and neck stiffness.   Skin:  Negative for color change, pallor, rash and wound.   Allergic/Immunologic: Negative for environmental allergies, food allergies and immunocompromised state.   Neurological:  Negative for dizziness, tremors, seizures, syncope, facial asymmetry, speech difficulty, weakness, light-headedness, numbness and headaches.   Hematological:  Negative for adenopathy. Does not bruise/bleed easily.   Psychiatric/Behavioral:  Negative for agitation, behavioral problems,  "confusion, decreased concentration, dysphoric mood, hallucinations, self-injury, sleep disturbance and suicidal ideas. The patient is not nervous/anxious and is not hyperactive.         Objective      Physical Exam  /98   Ht 154.9 cm (60.98\")   Wt 78 kg (172 lb)   LMP  (LMP Unknown)   BMI 32.52 kg/m²     Body mass index is 32.52 kg/m².         General:   Mental Status:  Alert   Appearance: Cooperative, in no acute distress   Build and Nutrition: Well-nourished well-developed female   Orientation: Alert and oriented to person, place and time   Posture: Normal   Gait: Nonantalgic/normal    Integument:   Right knee: Wound is well-healed with no signs of infection    Lower Extremities:   Right Knee:    Tenderness:  None    Effusion:  None    Swelling: None    Crepitus:  None    Range of motion:  Extension: 0°       Flexion: 120°  Instability:  No varus laxity, no valgus laxity, negative anterior drawer  Deformities:  None      Imaging/Studies  Imaging Results (Last 24 Hours)       Procedure Component Value Units Date/Time    XR Knee 3+ View With Concepcion Right [[id number]] Resulted: 02/26/25 0748     Updated: 02/26/25 0748    Narrative:      Right Knee Radiographs  Indication: status-post right total knee arthroplasty  Views: AP, lateral, and sunrise views of the right knee    Comparison: no change compared to prior study, 2/22/2024    Findings:   The components are well aligned, with no signs of loosening or failure.                 Assessment and Plan     Diagnoses and all orders for this visit:    1. S/P total knee arthroplasty, right (Primary)  -     XR Knee 3+ View With Concepcion Right    2. Postoperative examination        1. S/P total knee arthroplasty, right    2. Postoperative examination          I reviewed my findings with the patient.  Her right total knee arthroplasty is functioning well and she is pleased with results.  I will see her back in 4 years for what will be a 5-year checkup, but I will be " happy to see her back sooner for any problems.    Return in about 4 years (around 2/26/2029) for recheck with x-rays.      Irvin Del Cid MD  02/26/25  07:51 EST    Dictated Utilizing Dragon Dictation

## 2025-02-28 ENCOUNTER — HOSPITAL ENCOUNTER (OUTPATIENT)
Dept: MAMMOGRAPHY | Facility: HOSPITAL | Age: 70
Discharge: HOME | End: 2025-02-28
Payer: MEDICARE

## 2025-02-28 ENCOUNTER — HOSPITAL ENCOUNTER (OUTPATIENT)
Dept: ULTRASOUND IMAGING | Facility: HOSPITAL | Age: 70
Discharge: HOME | End: 2025-02-28
Payer: MEDICARE

## 2025-02-28 ENCOUNTER — HOSPITAL ENCOUNTER (OUTPATIENT)
Dept: NUCLEAR MEDICINE | Facility: HOSPITAL | Age: 70
Discharge: HOME | End: 2025-02-28

## 2025-02-28 DIAGNOSIS — C50.212 MALIGNANT NEOPLASM OF UPPER-INNER QUADRANT OF LEFT FEMALE BREAST, UNSPECIFIED ESTROGEN RECEPTOR STATUS: ICD-10-CM

## 2025-02-28 PROCEDURE — 38792 RA TRACER ID OF SENTINL NODE: CPT

## 2025-02-28 PROCEDURE — A9541 TC99M SULFUR COLLOID: HCPCS | Performed by: SURGERY

## 2025-02-28 PROCEDURE — 88307 TISSUE EXAM BY PATHOLOGIST: CPT | Performed by: SURGERY

## 2025-02-28 PROCEDURE — 25010000002 LIDOCAINE 1 % SOLUTION: Performed by: RADIOLOGY

## 2025-02-28 PROCEDURE — C1819 TISSUE LOCALIZATION-EXCISION: HCPCS

## 2025-02-28 PROCEDURE — 76098 X-RAY EXAM SURGICAL SPECIMEN: CPT

## 2025-02-28 PROCEDURE — 34310000005 TECHNETIUM FILTERED SULFUR COLLOID: Performed by: SURGERY

## 2025-02-28 RX ORDER — LIDOCAINE HYDROCHLORIDE 10 MG/ML
5 INJECTION, SOLUTION INFILTRATION; PERINEURAL ONCE
Status: COMPLETED | OUTPATIENT
Start: 2025-02-28 | End: 2025-02-28

## 2025-02-28 RX ADMIN — TECHNETIUM TC 99M SULFUR COLLOID 1 DOSE: KIT at 12:47

## 2025-02-28 RX ADMIN — Medication 1.5 ML: at 10:33

## 2025-03-03 ENCOUNTER — LAB REQUISITION (OUTPATIENT)
Dept: LAB | Facility: HOSPITAL | Age: 70
End: 2025-03-03
Payer: MEDICARE

## 2025-03-03 DIAGNOSIS — C50.212 MALIGNANT NEOPLASM OF UPPER-INNER QUADRANT OF LEFT FEMALE BREAST: ICD-10-CM

## 2025-03-05 LAB
CYTO UR: NORMAL
LAB AP CASE REPORT: NORMAL
LAB AP CLINICAL INFORMATION: NORMAL
LAB AP SYNOPTIC CHECKLIST: NORMAL
PATH REPORT.FINAL DX SPEC: NORMAL
PATH REPORT.GROSS SPEC: NORMAL

## 2025-03-06 ENCOUNTER — HOSPITAL ENCOUNTER (OUTPATIENT)
Facility: HOSPITAL | Age: 70
Setting detail: RADIATION/ONCOLOGY SERIES
End: 2025-03-06
Payer: MEDICARE

## 2025-03-06 ENCOUNTER — OFFICE VISIT (OUTPATIENT)
Age: 70
End: 2025-03-06
Payer: MEDICARE

## 2025-03-06 VITALS
OXYGEN SATURATION: 96 % | SYSTOLIC BLOOD PRESSURE: 145 MMHG | HEIGHT: 61 IN | HEART RATE: 84 BPM | DIASTOLIC BLOOD PRESSURE: 88 MMHG | RESPIRATION RATE: 16 BRPM | BODY MASS INDEX: 32.1 KG/M2 | TEMPERATURE: 98 F | WEIGHT: 170 LBS

## 2025-03-06 VITALS
DIASTOLIC BLOOD PRESSURE: 88 MMHG | OXYGEN SATURATION: 96 % | BODY MASS INDEX: 32.1 KG/M2 | TEMPERATURE: 98 F | HEIGHT: 61 IN | SYSTOLIC BLOOD PRESSURE: 145 MMHG | HEART RATE: 84 BPM | RESPIRATION RATE: 16 BRPM | WEIGHT: 170 LBS

## 2025-03-06 DIAGNOSIS — Z17.0 MALIGNANT NEOPLASM OF UPPER-INNER QUADRANT OF LEFT BREAST IN FEMALE, ESTROGEN RECEPTOR POSITIVE: Primary | ICD-10-CM

## 2025-03-06 DIAGNOSIS — M85.80 OSTEOPENIA, UNSPECIFIED LOCATION: Primary | ICD-10-CM

## 2025-03-06 DIAGNOSIS — Z78.0 POSTMENOPAUSAL: ICD-10-CM

## 2025-03-06 DIAGNOSIS — C50.212 MALIGNANT NEOPLASM OF UPPER-INNER QUADRANT OF LEFT BREAST IN FEMALE, ESTROGEN RECEPTOR POSITIVE: Primary | ICD-10-CM

## 2025-03-06 PROCEDURE — 1126F AMNT PAIN NOTED NONE PRSNT: CPT | Performed by: INTERNAL MEDICINE

## 2025-03-06 PROCEDURE — 99204 OFFICE O/P NEW MOD 45 MIN: CPT | Performed by: INTERNAL MEDICINE

## 2025-03-06 PROCEDURE — 3079F DIAST BP 80-89 MM HG: CPT | Performed by: INTERNAL MEDICINE

## 2025-03-06 PROCEDURE — 3077F SYST BP >= 140 MM HG: CPT | Performed by: INTERNAL MEDICINE

## 2025-03-06 NOTE — PATIENT INSTRUCTIONS
We are sending an oncotype test today. Follow up in 3 weeks with results if high risk. You should get a phone call prior to that visit to let you know 1) results are available and 2) whether to keep that appointment     IF low risk (no chemo) <26   Cancel 3 week follow up  Contact radiation oncology to schedule radiation start  RTC 2 weeks after radiation completes to discuss hormone blocker     IF high risk (chemo recommended) 26 or higher  Keep follow up with me in 3 weeks to discuss     IF you have not gotten phone call prior to scheduled visit, please call Dr. Doran's nurse for results.

## 2025-03-06 NOTE — PROGRESS NOTES
CONSULTATION NOTE      :                                                          1955  DATE OF CONSULTATION:                       3/6/2025   REQUESTING PHYSICIAN:                   Kala Buitrago MD  REASON FOR CONSULTATION:           Malignant neoplasm of upper-inner quadrant of left breast in female, estrogen receptor positive  Clinical- Stage IA (cT1b, cN0, cM0, G2, ER+, MS+, HER2-)  Pathologic- Stage IA (pT1b, pN0(sn), cM0, G2, ER+, MS+, HER2-)           BRIEF HISTORY:  The patient is a very pleasant 69 y.o. female  with recent diagnosis of breast cancer.  She presented with an abnormal mammogram in 2024 showing suspicious density in the upper inner quadrant of the left breast approximately 6 cm from the nipple.  Biopsy showed moderately invasive ductal carcinoma, ER positive, MS positive, HER2 negative.  MRI confirmed 0.6 cm suspicious mass at 11:00 in the left breast.  There is also a separate suspicious area in the right lower outer breast.  Biopsy of the right breast lesion was benign.  Patient underwent lumpectomy and sentinel lymph node biopsy 2025.  Final pathology confirmed 0.8 cm focus of moderately differentiated ductal carcinoma.  Margins were clear closest posteriorly at less than 1 mm.  Shedd lymph node was negative.  She is healing well.      Allergy: No Known Allergies    Social History:   Social History     Socioeconomic History    Marital status:    Tobacco Use    Smoking status: Former     Current packs/day: 0.00     Average packs/day: 0.5 packs/day for 6.7 years (3.3 ttl pk-yrs)     Types: Cigarettes     Start date: 9/10/1969     Quit date: 1976     Years since quittin.8    Smokeless tobacco: Never    Tobacco comments:     High school + one year college   Vaping Use    Vaping status: Never Used   Substance and Sexual Activity    Alcohol use: Yes     Alcohol/week: 2.0 standard drinks of alcohol     Types: 2 Drinks containing 0.5 oz of alcohol  per week     Comment: Ulcer. 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    Nephrolithiasis     Osteoarthritis 01/17/2017    Osteopenia 01/17/2017    Premature ventricular contractions 01/17/2017    with appropriate suppression on stress echocardiography.    Tendinitis of knee ?    Maybe       Family History: family history includes Breast cancer (age of onset: 30) in an other family member; Cancer in her mother, paternal grandfather, and sister; Diabetes in her brother; Heart attack in her maternal grandfather and maternal grandmother; Heart attack (age of onset: 70) in her father; Heart disease in her mother and paternal grandmother; Hyperlipidemia in her brother; Ovarian cancer (age of onset: 49) in her sister.     Surgical History:   Past Surgical History:   Procedure Laterality Date    BREAST LUMPECTOMY      CATARACT EXTRACTION Bilateral     CHOLECYSTECTOMY      COLONOSCOPY      DENTAL PROCEDURE      [place] teeth extraction.    FOOT SURGERY  2023    Remove ingrown toenail    KIDNEY STONE SURGERY      Lithotripsy x2.    KNEE ARTHROSCOPY Right     TONSILLECTOMY      TOTAL KNEE ARTHROPLASTY Right 02/06/2024    Procedure: TOTAL KNEE ARTHROPLASTY WITH CORI ROBOT - RIGHT;  Surgeon: Irvin Del Cid MD;  Location: [facility];  Service: Robotics - Ortho;  Laterality: Right;        Review of Systems:   Review of Systems   Constitutional:  Positive for fatigue. Negative for appetite change, chills, fever and unexpected weight change.   HENT:   Positive for sore throat. Negative for trouble swallowing.      "    Nasal congestion; headache     Eyes: Negative.    Respiratory:  Positive for cough (congested cough but nonproductive) and wheezing. Negative for chest tightness and shortness of breath.    Cardiovascular:  Negative for chest pain and leg swelling.   Gastrointestinal:  Negative for abdominal pain, diarrhea, nausea and vomiting.   Endocrine: Negative for hot flashes.   Genitourinary: Negative.     Musculoskeletal:  Positive for arthralgias (right knee stiffness) and back pain (with prolonged lying down).   Skin:  Positive for wound (left breast incision x 2; surgical glue in place).   Neurological:  Positive for headaches (past 2 nights but relates to sinus issues). Negative for dizziness and light-headedness.   Hematological:  Bruises/bleeds easily.   Psychiatric/Behavioral:  Positive for depression (recent loss of brother) and sleep disturbance.        Age at menarche: 13  Age at menopause: 46  Hormone Replacement: OCPs x 10 years  Personal hx of breast cancer:No  Family hx of breast cancer:Yes  Age of first live birth: 23  Radiation to the chest before the age of 30:No      Objective   VITAL SIGNS:   Vitals:    03/06/25 0928   BP: 145/88   Pulse: 84   Resp: 16   Temp: 98 °F (36.7 °C)   TempSrc: Oral   SpO2: 96%   Weight: 77.1 kg (170 lb)  Comment: stated   Height: 154.9 cm (60.98\")   PainSc: 3    PainLoc: Breast        Karnofsky score: 90       Physical Exam:   Physical Exam  Vitals and nursing note reviewed.   Constitutional:       Appearance: She is well-developed.   HENT:      Head: Normocephalic and atraumatic.   Cardiovascular:      Rate and Rhythm: Normal rate and regular rhythm.      Heart sounds: Normal heart sounds. No murmur heard.  Pulmonary:      Effort: Pulmonary effort is normal.      Breath sounds: Normal breath sounds. No wheezing or rales.   Chest:          Comments: 3 cm seroma cavity L UIQ with healing incisions.  Abdominal:      General: Bowel sounds are normal. There is no distension.      " Palpations: Abdomen is soft.      Tenderness: There is no abdominal tenderness.   Musculoskeletal:         General: No tenderness. Normal range of motion.      Cervical back: Normal range of motion and neck supple.   Lymphadenopathy:      Cervical: No cervical adenopathy.      Upper Body:      Right upper body: No supraclavicular adenopathy.      Left upper body: No supraclavicular adenopathy.   Skin:     General: Skin is warm and dry.   Neurological:      Mental Status: She is alert and oriented to person, place, and time.      Sensory: No sensory deficit.   Psychiatric:         Behavior: Behavior normal.         Thought Content: Thought content normal.         Judgment: Judgment normal.          The following portions of the patient's history were reviewed and updated as appropriate: allergies, current medications, past family history, past medical history, past social history, past surgical history, and problem list.    Assessment:   Assessment      Carcinoma of the left upper inner breast.  Stage IA (T1b, M0, M0).  This is moderately differentiated, strongly hormone receptor positive and HER2 negative.  We reviewed the role of radiotherapy as part of breast conserving treatment.  All questions were answered.  Informed consent was obtained today.  She will need some additional time for healing before radiotherapy can begin.  She also has a medical oncology consultation with Dr. Doran pending for later today to determine if she will first require adjuvant chemotherapy or if she can have only antiestrogen therapy following her radiotherapy course.    RECOMMENDATIONS: She will return in approximately 2 weeks for reevaluation.  Simulation can be performed at that time if she is healed sufficiently and if she is not planned for adjuvant cytotoxic chemotherapy.  The left breast tissue will receive a dose of 40.05 Cantu delivered over 3 weeks with deep inspiration breath-hold technique.  An additional 10 Gray boost  will be given to the lumpectomy cavity with electrons over 1 additional week of treatment since she had a close posterior surgical margin.    I spent a total of 45 minutes on todays visit, with more than 35 minutes in direct face to face communication, and the remainder of the time spent in reviewing the relevant history, records, available imaging, and for documentation.    Follow Up:   Return in about 2 weeks (around 3/20/2025) for Office Visit, Simulation.  Diagnoses and all orders for this visit:    1. Malignant neoplasm of upper-inner quadrant of left breast in female, estrogen receptor positive (Primary)         Gilberto Hearn MD

## 2025-03-06 NOTE — PROGRESS NOTES
[facility]ology [facility]  Office number [phone]    Fax number [phone]     New Patient Office Visit      Date: 2025     Patient Name: Funmilayo Cantu  MRN: [id number]  : 1955    Referring Physician: Dr. Kala Buitrago    Chief Complaint: Left breast cancer    Cancer Staging: IA    History of Present Illness: Funmilayo Cantu is a pleasant 69 y.o. female who presents today for evaluation of left breast cancer.     Screening mammogram 24 showed a 6 mm focal asymmetry in the LUIQ prompting further workup.     Left breast biopsy 24 showed invasive ductal carcinoma with lobular features, grade 2 (%, %, HER 2 negative 0+).     Left breast lumpectomy 25 showed 8 mm invasive ductal carcinoma grade 2 with margins negative by < 1 mm. [place] lymph nodes negative (0/1).     She is recovering well from surgery and presents to multidisciplinary breast clinic for an opinion regarding adjuvant management of recently diagnosed breast cancer.  Postoperative breast pain is well controlled.  Denies leg swelling, shortness of breath, fever, cough, or other new symptoms.     Review of Systems:   I have reviewed the review of systems completed by the patient. This is negative for clinically significant symptoms except as noted below. This document has been scanned into the patient's chart.    Past Medical History:   Past Medical History:   Diagnosis Date    Breast cancer     Fracture of wrist     GERD (gastroesophageal reflux disease)     Hip arthrosis 2021    Hyperlipidemia     Hypertension     Kidney stones     HX OF    Knee swelling     Low back strain 1995    Mild obesity 2017    Neck strain ?    Nephrolithiasis     Osteoarthritis 2017    Osteopenia 2017    Premature ventricular contractions 2017    with appropriate suppression on stress echocardiography.    Tendinitis of knee ?    Maybe   No personal history of myocardial  infarction, cerebrovascular event, or venous thromboembolism.  Osteopenia    Past Surgical History:   Past Surgical History:   Procedure Laterality Date    BREAST LUMPECTOMY      CATARACT EXTRACTION Bilateral     CHOLECYSTECTOMY      COLONOSCOPY      DENTAL PROCEDURE      [place] teeth extraction.    FOOT SURGERY      Remove ingrown toenail    KIDNEY STONE SURGERY      Lithotripsy x2.    KNEE ARTHROSCOPY Right     TONSILLECTOMY      TOTAL KNEE ARTHROPLASTY Right 2024    Procedure: TOTAL KNEE ARTHROPLASTY WITH CORI ROBOT - RIGHT;  Surgeon: Irvin Del Cid MD;  [facility];  Service: Robotics - Ortho;  Laterality: Right;       Family History:   Family History   Problem Relation Age of Onset    Heart disease Mother     Cancer Mother         Non-Hodgkin’s Lymphoma    Heart attack Father 70    Ovarian cancer Sister 49    Cancer Sister         Ovarian    Hyperlipidemia Brother     Diabetes Brother     Heart attack Maternal Grandmother     Heart attack Maternal Grandfather     Heart disease Paternal Grandmother     Cancer Paternal Grandfather         Mouth    Breast cancer Other 30       Social History:   Social History     Socioeconomic History    Marital status:    Tobacco Use    Smoking status: Former     Current packs/day: 0.00     Average packs/day: 0.5 packs/day for 6.7 years (3.3 ttl pk-yrs)     Types: Cigarettes     Start date: 9/10/1969     Quit date: 1976     Years since quittin.8    Smokeless tobacco: Never    Tobacco comments:     High school + one year college   Vaping Use    Vaping status: Never Used   Substance and Sexual Activity    Alcohol use: Yes     Alcohol/week: 2.0 standard drinks of alcohol     Types: 2 Drinks containing 0.5 oz of alcohol per week     Comment: Ulcer. 2 COCKTAILS IN THE EVENING    Drug use: No    Sexual activity: Yes     Partners: Male     Birth control/protection: Post-menopausal       Medications:     Current Outpatient Medications:     amLODIPine  "(NORVASC) 5 MG tablet, Take 1 tablet by mouth Daily., Disp: , Rfl:     aspirin 81 MG EC tablet, Take 1 tablet by mouth Daily., Disp: , Rfl:     atorvastatin (LIPITOR) 10 MG tablet, Take 1 tablet by mouth every night at bedtime., Disp: , Rfl:     cholecalciferol (VITAMIN D3) 1000 units tablet, Take 1 tablet by mouth Daily., Disp: , Rfl:     famotidine (PEPCID) 40 MG tablet, , Disp: , Rfl:     HYDROcodone-acetaminophen (NORCO) 5-325 MG per tablet, Take 1 tablet by mouth Every 8 (Eight) Hours As Needed. (Patient not taking: Reported on 3/6/2025), Disp: 7 tablet, Rfl: 0    losartan (COZAAR) 50 MG tablet, Take 1 tablet by mouth Daily., Disp: , Rfl:     Restasis 0.05 % ophthalmic emulsion, , Disp: , Rfl:     Allergies:   No Known Allergies    Objective     Vital Signs:   Vitals:    03/06/25 1114   BP: 145/88   Pulse: 84   Resp: 16   Temp: 98 °F (36.7 °C)   SpO2: 96%   Weight: 77.1 kg (170 lb)   Height: 154.9 cm (61\")   PainSc: 0-No pain    Body mass index is 32.12 kg/m².   Pain Score    03/06/25 1114   PainSc: 0-No pain       ECOG Performance Status: 0 - Asymptomatic    Physical Exam:   General: No acute distress. Well appearing   HEENT: Normocephalic, atraumatic. Sclera anicteric.   Neck: supple, no adenopathy.   Cardiovascular: regular rate and rhythm. No murmurs.   Respiratory: Normal rate. Clear to auscultation bilaterally  Abdomen: Soft, nontender, non distended with normoactive bowel sounds  Lymph: no cervical, supraclavicular or axillary adenopathy  Neuro: Alert and oriented x 3. No focal deficits.   Ext: Symmetric, no swelling.     Laboratory/Imaging Reviewed:   Lab Requisition on 02/28/2025   Component Date Value Ref Range Status    Case Report 02/28/2025    Final                    Value:Surgical Pathology Report                         Case: [id number]                                  Authorizing Provider:  Kala Buitrago MD   Collected:           02/28/2025 12:54 PM          Ordering Location:     " [facility]   Received:            03/03/2025 07:24 AM                                 LABORATORY                                                                   Pathologist:           Duc Rowe MD                                                        Specimens:   1) - Breast, Left                                                                                   2) - Breast, Left                                                                          Clinical Information 02/28/2025    Final                    Value:Malignant neoplasm of upper-inner quadrant of left female breast      Final Diagnosis 02/28/2025    Final                    Value:1.  LEFT BREAST, NEEDLE LOCALIZED LUMPECTOMY:  Invasive ductal carcinoma, intermediate-grade, 8 mm in maximum extent.  Limited associated ductal carcinoma in situ, intermediate grade, solid pattern  Invasive and in situ carcinoma present less than 1 mm from posterior margin of specimen.  See tumor synoptic for additional details.    2.  LYMPH NODE, LEFT AXILLARY, SENTINEL NODE EXCISION:  1 lymph node negative for metastatic carcinoma (0/1).      Synoptic Checklist 02/28/2025    Final                    Value:INVASIVE CARCINOMA OF THE BREAST: Resection                            INVASIVE CARCINOMA OF THE BREAST: RESECTION - All Specimens                            8th Edition - Protocol posted: 6/19/2024                                                        SPECIMEN                               Procedure:    Excision (less than total mastectomy)                                Specimen Laterality:    Left                                                         TUMOR                               Tumor Site:    Clock position                                :    11 o'clock                              Histologic Type:    Invasive carcinoma of no special type (ductal)                              Histologic Grade ([place] Histologic Score):                                    Glandular (Acinar) / Tubular Differentiation:    Score 3                                Nuclear Pleomorphism:    Score 2                                Mitotic Rate:    Score 1                                Overall Grade:    Grade 2 (scores of 6 or 7)                              Tumor Size:    Greatest dimension of largest invasive focus (Millimeters): 8 mm                               Additional Dimension (Millimeters):    5 mm                               Additional Dimension (Millimeters):    5 mm                             Tumor Focality:    Single focus of invasive carcinoma                              Ductal Carcinoma In Situ (DCIS):    Present                                :    Negative for extensive intraductal component (EIC)                                Architectural Patterns:    Solid                                Nuclear Grade:    Grade II (intermediate)                                Necrosis:    Not identified                              Lobular Carcinoma In Situ (LCIS):    Not identified                              Lymphatic and / or Vascular Invasion:    Not identified                              Dermal Lymphatic and / or Vascular Invasion:    No skin present                              Treatment Effect in the Breast:    No known presurgical therapy                                                         MARGINS                             Margin Status for Invasive Carcinoma:    All margins negative for invasive carcinoma                                Distance from Invasive Carcinoma to Closest Margin:    Less than: 1 mm                               Closest Margin(s) to Invasive Carcinoma:    Posterior                              Margin Status for DCIS:    All margins negative for DCIS                                Distance from DCIS to Closest Margin:    Less than: 1 mm                               Closest Margin(s) to DCIS:    Posterior                      "                                    REGIONAL LYMPH NODES                             Regional Lymph Node Status:                                   :    All regional lymph nodes negative for tumor                                Total Number of Lymph Nodes Examined (sentinel and non-sentinel):    1                                Number of Jean Nodes Examined:    1                                                         pTNM CLASSIFICATION (AJCC 8th Edition)                               Reporting of pT, pN, and (when applicable) pM categories is based on information available to the pathologist at the time the report is issued. As per the AJCC (Chapter 1, 8th Ed.) it is the managing physician's responsibility to establish the final pathologic stage based upon all pertinent information, including but potentially not limited to this pathology report.                             pT Category:    pT1b                              pN Category:    pN0                              N Suffix:    (sn)                                                         SPECIAL STUDIES                               Estrogen Receptor (ER) Status:    Positive (greater than 10% of cells demonstrate nuclear positivity)                                  Percentage of Cells with Nuclear Positivity:    %                                Progesterone Receptor (PgR) Status:    Positive                                  Percentage of Cells with Nuclear Positivity:    %                                HER2 (by immunohistochemistry):    Negative (Score 0)                                Testing Performed on Case Number:    [id number]       Gross Description 02/28/2025    Final                    Value:1. Breast, Left.  Received fresh and placed in formalin labeled \"left breast cancer\" is an 18 g, 5.8 cm superior to inferior, 4.1 cm medial to lateral, 1.4 cm anterior to posterior needle localized lumpectomy which is oriented by the surgeon with " "a short stitch designated superior, a long stitch designated anterior, and a wire designated lateral.  Skin is not present.  The specimen is inked as follows: Anterior-green, inferior-blue, lateral-orange, medial-yellow, posterior-black, superior-red.  Sectioning from superior to inferior into 11 slices reveals a 0.8 x 0.8 x 0.5 cm firm, white mass located within slices #5-6.  The mass is 0.1 cm from the posterior margin, 0.5 cm from the anterior margin, 1.2 cm to medial margin, 1.7 cm from the lateral margin, 2.0 cm from the inferior margin, and 1.6 cm from the superior margin.  The remaining fat to fibrous ratio is 80:20.  No other gross lesions are identified.  Representative sections are submitted as follows to include the entire                           lesion:  1A-perpendicular superior margin (slice #1)  1B-1C-slice #5, bisected  1D-1E-slice #6, bisected  1F-perpendicular inferior margin (slice #11).  Cold ischemic time is 22 minutes and total time in formalin is greater than 6 and less than 72 hours.    2. Breast, Left.  Received in formalin labeled \"left sentinel node biopsy\" is a 1.5 x 1.3 x 0.9 cm lymph node with surrounding fat.  The excess fat is removed and the lymph node a section perpendicular to the long axis at 2 mm intervals.  The specimen is submitted entirely in blocks 2A-2B.  LDP        Microscopic Description 02/28/2025    Final                    Value:The slides are reviewed and demonstrate histopathologic features supporting the above rendered diagnosis.           Mammo Breast Specimen    Result Date: 2/28/2025  Narrative: SPECIMEN RADIOGRAPH:  Specimen radiograph demonstrates the clip, the mass and the wire. Visualized margins appear uninvolved.  Results were communicated to the operating room at the time the specimen was received by myself.  2/28/2025 1:15 PM by Melisa Lozada MD on Workstation: [id number]      NM [place] Node Injection Only    Result Date: 2/28/2025  Narrative: This " "procedure was auto-finalized with no dictation required.    XR Knee 3+ View With [place] Right    Result Date: 2/26/2025  Narrative: Right Knee Radiographs Indication: status-post right total knee arthroplasty Views: AP, lateral, and sunrise views of the right knee Comparison: no change compared to prior study, 2/22/2024 Findings: The components are well aligned, with no signs of loosening or failure.     US Guided Breast Biopsy With & Without Device initial, Mammo Post Device Placement Right    Result Date: 2/13/2025  Narrative:   14G [place] ULTRASOUND GUIDED CORE BIOPSY   HISTORY: 69-year-old female with non-mass-like enhancement in the right breast extending from the nipple to 2 1/2 cm deep into the lower outer quadrant of the right breast for which second look ultrasound demonstrated a hypoechoic area at 9:00 in the periareolar region which measures 11 x 4 x 3 mm for which ultrasound-guided core biopsy was performed.  PROCEDURE: Written and verbal consent was obtained for ultrasound guided core biopsy of an 11 mm hypoechoic area in the right breast at 9:00 in the periareolar region.  \"Time out\" was observed to verify the patient's identity and correct location of the breast abnormality. The presence of the lesion was confirmed ultrasound and a lateral approach was chosen. The breast was prepped and draped in the usual sterile fashion. 10 mL 1% lidocaine with epinephrine and 5 mL 1% lidocaine without epinephrine were utilized for local anesthesia. A small skin incision was made with the coaxial needle and the biopsy needle was placed into the hypoechoic. A total of 5 core samples were obtained. The specimens were placed in formalin and forwarded to the Pathology Department. A coil clip post biopsy marking clip was placed. Post procedure mammogram for marker placement was performed. The coil clip is in satisfactory position.  Upon completion of the procedure, compression was applied to the biopsy site until all " appreciable bleeding subsided and a sterile dressing was applied. Post biopsy instructions were reviewed with the patient by our clinical breast imaging staff. A written copy of these instructions was also given to the patient. The patient tolerated the procedure well and no immediate complications occurred.  SUMMARY: 14-gauge ultrasound guided core biopsy of an 11 mm hypoechoic area in the right breast at 9:00 in the periareolar region.  A coil clip post biopsy marking clip was placed. The coil clip is in satisfactory position.  PATHOLOGY: Benign breast tissue with prominent stromal fibrosis. No atypia identified.      Impression: Pathology results are benign and are concordant with imaging.  RECOMMENDATION: No further management is noted for this finding. Further management of the patient's known left breast cancer should be based upon clinical assessment.  The patient will be called with final biopsy results and recommendations by our breast care nurse.     2/13/2025 10:16 AM by Dr. Dennis Snider MD on Workstation: [id number]      MRI Breast Bilateral Diagnostic W WO Contrast    Result Date: 2/6/2025  Narrative: BILATERAL BREAST MRI   HISTORY: 69-year-old female status post left breast ultrasound-guided core biopsy of a 6 mm mass at 11:00, 6 cm from the nipple, on 12/30/2024 with results of invasive ductal carcinoma with lobular features, grade 2. Preoperative planning.  TECHNIQUE:  MRI was performed on a 1.5 Prabha magnet utilizing a 16-channel Kristin breast coil.  Pre-contrast spin-echo T1 weighted and T2 weighted sequences were obtained in the axial plane.  Routine dynamic images were performed following the administration of 16 ml of Multihance contrast.  Four postcontrast runs were obtained. No contrast complications occurred.  Delayed high resolution post contrast T1 weighted sagittal images were also obtained.  A CAD system ([employer]) was utilized for data analysis.   COMPARISON: Prior screening mammograms dated  12/11/2024, prior left diagnostic mammogram, left breast ultrasound, and left breast ultrasound-guided core biopsy performed on 12/30/2024. Additional prior screening mammograms dating back to 7/7/2020.   FINDINGS: Contrast is noted in the heart and great vessels. Moderate background parenchymal enhancement is noted bilaterally. There is a nodular parenchymal pattern bilaterally without a focal dominant nodule noted. Scattered fibroglandular breast tissue is noted bilaterally.  Signal void artifact is noted in the left breast at 11:00, 6 cm from the nipple, consistent with the biopsy tissue marker at the site of the biopsy-proven malignancy. No surrounding abnormal enhancement is noted. No suspicious masslike nor non-mass-like enhancement is noted in the left breast. Benign-appearing cysts are noted in the left breast.  Within the lower outer quadrant of the right breast, immediately deep to the nipple, there is non-mass-like enhancement spanning 2.5 cm from the nipple into the anterior breast tissue. Otherwise, no suspicious masslike nor non-mass-like enhancement is noted in the right breast. A benign-appearing cyst is noted in the right breast at 6:00, anterior one third depth.  No suspicious axillary nor internal mammary adenopathy is noted bilaterally.       Impression: BI-RADS 0, additional imaging advised.  RECOMMENDATIONS: There is non-mass-like enhancement extending from the right nipple into the lower outer quadrant of the right breast spanning 2.5 cm. MRI directed ultrasound is advised for further evaluation. Signal void artifact is noted in the left breast at 11:00, 6 cm from the nipple, consistent with the biopsy-proven malignancy. No associated abnormal enhancement is noted, suggesting the residual malignancy is obscured by the biopsy marker artifact. Further management of the biopsy-proven left breast malignancy should be based on clinical assessment.  BI-RADS CATEGORY: BI-RADS 0, additional imaging  advised.    This report was finalized on 2/6/2025 4:08 PM by Dr. Dennis Snider MD.       Procedures    Assessment / Plan      Assessment/Plan:   Left breast cancer, stage IA, ER positive/HER 2 negative  I reviewed the patient's history, imaging and pathology.   We discussed staging, prognosis and general principles of breast cancer therapy.  She has an early stage, estrogen sensitive breast cancer and has undergone curative intent surgery.  She will benefit from 5-10 years of endocrine therapy to reduce her risk for disease recurrence given that this is an estrogen sensitive tumor.   -We discussed that a proportion of women with hormone sensitive, lymph node negative breast cancer benefit from the addition of chemotherapy to endocrine therapy.  We discussed the utility of the Oncotype DX recurrence score to stratify the potential added benefit for an individual patient.  -She is motivated to consider the addition of chemotherapy and consents to Oncotype testing.    There are no diagnoses linked to this encounter.   Patient Instructions   We are sending an oncotype test today. Follow up in 3 weeks with results if high risk. You should get a phone call prior to that visit to let you know 1) results are available and 2) whether to keep that appointment     IF low risk (no chemo) <26   Cancel 3 week follow up  Contact radiation oncology to schedule radiation start  RTC 2 weeks after radiation completes to discuss hormone blocker     IF high risk (chemo recommended) 26 or higher  Keep follow up with me in 3 weeks to discuss     IF you have not gotten phone call prior to scheduled visit, please call Dr. Doran's nurse for results.       Follow Up:   No follow-ups on file.     Liseth Doran MD  Hematology and Oncology

## 2025-03-19 ENCOUNTER — TELEPHONE (OUTPATIENT)
Dept: ONCOLOGY | Facility: CLINIC | Age: 70
End: 2025-03-19
Payer: MEDICARE

## 2025-03-19 NOTE — TELEPHONE ENCOUNTER
Advised patient that oncotype was submitted on 3-6-25 on the website but once it was finished, the submit button did not take the submission so when this RN reviewed status on 3-18-25 it was noted to not have been submitted so it was submitted on 3-18-25.  Patient advised that Dr. Doran was notified and stated that it is good she is not far out from surgery and advised to ask for it to be expedited with Oncotype and to push out follow up one week.  Patient verbalized understanding and agreed with plan.  Message sent to Oncotype representative Alexander GAUTAM, to ask for order to be expedited.  Tere in radiation notified.

## 2025-03-26 ENCOUNTER — TELEPHONE (OUTPATIENT)
Dept: ONCOLOGY | Facility: CLINIC | Age: 70
End: 2025-03-26
Payer: MEDICARE

## 2025-03-26 LAB
CYTO UR: NORMAL
LAB AP CASE REPORT: NORMAL
LAB AP CLINICAL INFORMATION: NORMAL
LAB AP GENOMIC HEALTH, ADDENDUM: NORMAL
LAB AP SYNOPTIC CHECKLIST: NORMAL
PATH REPORT.FINAL DX SPEC: NORMAL
PATH REPORT.GROSS SPEC: NORMAL

## 2025-03-26 NOTE — TELEPHONE ENCOUNTER
Advised patient per Dr. Doran that Oncotype RS = 6 and she is considered low risk, so MD does not recommend chemotherapy.  Advised patient to contact radiation oncology to start radiation and she can either keep the follow up tomorrow with Dr. Doran to discuss the hormone blocker or move her follow up out to 2 weeks after radiation completes.  Patient verbalized understanding and stated she would like to push follow up with Dr. Doran out to 2 weeks after radiation completes.  Advised patient this office will push follow up out 5 weeks and if it needs to be adjusted later, she can contact this office.  Patient verbalized understanding and agreed.

## 2025-03-27 ENCOUNTER — HOSPITAL ENCOUNTER (OUTPATIENT)
Facility: HOSPITAL | Age: 70
Setting detail: RADIATION/ONCOLOGY SERIES
Discharge: HOME | End: 2025-03-27
Payer: MEDICARE

## 2025-03-27 PROCEDURE — 77332 RADIATION TREATMENT AID(S): CPT | Performed by: RADIOLOGY

## 2025-03-27 PROCEDURE — 77290 THER RAD SIMULAJ FIELD CPLX: CPT | Performed by: RADIOLOGY

## 2025-04-01 ENCOUNTER — HOSPITAL ENCOUNTER (OUTPATIENT)
Facility: HOSPITAL | Age: 70
Setting detail: RADIATION/ONCOLOGY SERIES
End: 2025-04-01
Payer: MEDICARE

## 2025-04-03 ENCOUNTER — OFFICE VISIT (OUTPATIENT)
Dept: CARDIOLOGY | Facility: CLINIC | Age: 70
End: 2025-04-03
Payer: MEDICARE

## 2025-04-03 VITALS
SYSTOLIC BLOOD PRESSURE: 140 MMHG | OXYGEN SATURATION: 99 % | BODY MASS INDEX: 32.1 KG/M2 | HEART RATE: 79 BPM | HEIGHT: 61 IN | DIASTOLIC BLOOD PRESSURE: 66 MMHG | WEIGHT: 170 LBS

## 2025-04-03 DIAGNOSIS — I10 ESSENTIAL HYPERTENSION: Primary | ICD-10-CM

## 2025-04-03 DIAGNOSIS — E78.2 MIXED HYPERLIPIDEMIA: ICD-10-CM

## 2025-04-03 NOTE — PROGRESS NOTES
Follow-up Visit      Date: 2025  Patient Name: Funmilayo Cantu  : 1955   MRN: [id number]     Chief Complaint:    Chief Complaint   Patient presents with    Precordial pain       History of Present Illness: Funmilayo Cantu is a 69 y.o. female who is here today for follow-up on chest pain.    Patient has been doing fine.  Patient recently had a stress test that has been normal she is able to do most of the activities without any problem.  She denies any lower extremity edema.  She denies any paroxysmal nocturnal dyspnea.  Patient denies any bendopnea.    Patient denies any weight loss or any weight gain.  Patient denies any symptoms of claudication.  Patient denies any symptoms of sleep apnea.      Problem List     CARDIAC  Coronary Artery Disease:   Stress test, 1/3/2025:Normal     Myocardium:   Echo, 2025: LVEF 60%     Valvular:   No known valvular disease     Electrical:   Normal sinus rhythm     Pericardium:   Normal     CARDIAC RISK FACTORS  Hypertension  Dyslipidemia  Borderline diabetes  Family History: Premature CAD: Female < 65  Tobacco Use: Former Smoker  Menopausal state - Post-menopausal    NON-CARDIAC  Osteoarthritis  Osteopenia  GERD  Nephrolithiasis    SURGERIES  Right total knee arthroplasty  Bilateral cataract extraction  Cholecystectomy  Tonsillectomy  Kidney stone surgery  Foot surgery        Subjective      Review of Systems:   Review of Systems   Respiratory: Negative.     Cardiovascular: Negative.        Medications:     Current Outpatient Medications:     amLODIPine (NORVASC) 5 MG tablet, Take 1 tablet by mouth Daily., Disp: , Rfl:     aspirin 81 MG EC tablet, Take 1 tablet by mouth Daily., Disp: , Rfl:     atorvastatin (LIPITOR) 10 MG tablet, Take 1 tablet by mouth every night at bedtime., Disp: , Rfl:     cholecalciferol (VITAMIN D3) 1000 units tablet, Take 1 tablet by mouth Daily., Disp: , Rfl:     famotidine (PEPCID) 40 MG tablet, , Disp: , Rfl:     losartan  "(COZAAR) 50 MG tablet, Take 1 tablet by mouth Daily., Disp: , Rfl:     Restasis 0.05 % ophthalmic emulsion, , Disp: , Rfl:     Allergies:   No Known Allergies    Objective     Physical Exam:  Vitals:    04/03/25 1555   BP: 140/66   Pulse: 79   SpO2: 99%   Weight: 77.1 kg (170 lb)   Height: 154.9 cm (61\")     Body mass index is 32.12 kg/m².    Constitutional:       General: Not in acute distress.     Appearance: Healthy appearance. Not in distress.     Neck:     JVP:Not elevated     Carotid artery: Normal    Pulmonary:      Effort: Pulmonary effort is normal.      Breath sounds: Normal breath sounds. No wheezing. No rhonchi. No rales.     Cardiovascular:      Normal rate. Regular rhythm. Normal S1. Normal S2.      Murmurs: There is no significant murmur.      No gallop. No click. No rub.     Abdominal:      General: Bowel sounds are normal.      Palpations: Abdomen is soft.      Tenderness: There is no abdominal tenderness.    Extremities:     Pulses:Normal radial and pedal pulses     Edema:no edema    Smoking Cessation:   Tobacco Product History : Patient quit smoking in 1976     Lab Review:   Lab Results   Component Value Date    GLUCOSE 118 (H) 02/19/2025    BUN 16 02/19/2025    CREATININE 1.03 (H) 02/19/2025    EGFRIFNONA 64 01/13/2017    BCR 15.5 02/19/2025    K 4.8 02/19/2025    CO2 27.0 02/19/2025    CALCIUM 9.2 02/19/2025    ALBUMIN 4.0 02/19/2025    AST 18 02/19/2025    ALT 16 02/19/2025     Lab Results   Component Value Date    WBC 8.12 02/19/2025    HGB 14.2 02/19/2025    HCT 44.1 02/19/2025    .6 (H) 02/19/2025     02/19/2025     Lab Results   Component Value Date    TSH 2.144 01/13/2017           Assessment / Plan      Assessment:   Diagnosis Plan   1. Essential hypertension        2. Mixed hyperlipidemia             Plan:  Patient blood pressure has been good.  Patient will continue taking her Norvasc and Cozaar.  Patient cholesterol is being managed with Lipitor 10 mg daily.  The goal is " to keep LDL less than 100.  Importance of exercise and diet has been discussed in detail with her.      Follow Up:       Return in about 16 months (around 8/3/2026).    Kenji Nieto MD

## 2025-04-10 ENCOUNTER — HOSPITAL ENCOUNTER (OUTPATIENT)
Facility: HOSPITAL | Age: 70
Discharge: HOME | End: 2025-04-10

## 2025-04-10 ENCOUNTER — DOCUMENTATION (OUTPATIENT)
Dept: NUTRITION | Facility: HOSPITAL | Age: 70
End: 2025-04-10
Payer: MEDICARE

## 2025-04-10 VITALS — WEIGHT: 173 LBS | BODY MASS INDEX: 32.69 KG/M2

## 2025-04-10 LAB
RAD ONC ARIA COURSE ID: 1
RAD ONC ARIA COURSE INTENT: NORMAL
RAD ONC ARIA COURSE LAST TREATMENT DATE: NORMAL
RAD ONC ARIA COURSE START DATE: NORMAL
RAD ONC ARIA COURSE TREATMENT ELAPSED DAYS: 0
RAD ONC ARIA FIRST TREATMENT DATE: NORMAL
RAD ONC ARIA PLAN FRACTIONS TREATED TO DATE: 1
RAD ONC ARIA PLAN ID: NORMAL
RAD ONC ARIA PLAN NAME: NORMAL
RAD ONC ARIA PLAN PRESCRIBED DOSE PER FRACTION: 2.67 GY
RAD ONC ARIA PLAN PRIMARY REFERENCE POINT: NORMAL
RAD ONC ARIA PLAN TOTAL FRACTIONS PRESCRIBED: 15
RAD ONC ARIA PLAN TOTAL PRESCRIBED DOSE: 4005 CGY
RAD ONC ARIA REFERENCE POINT DOSAGE GIVEN TO DATE: 2.67 GY
RAD ONC ARIA REFERENCE POINT ID: NORMAL
RAD ONC ARIA REFERENCE POINT SESSION DOSAGE GIVEN: 2.67 GY

## 2025-04-10 PROCEDURE — 77387 GUIDANCE FOR RADJ TX DLVR: CPT | Performed by: RADIOLOGY

## 2025-04-10 PROCEDURE — 77280 THER RAD SIMULAJ FIELD SMPL: CPT | Performed by: RADIOLOGY

## 2025-04-10 PROCEDURE — 77412 RADIATION TX DELIVERY LVL 3: CPT | Performed by: RADIOLOGY

## 2025-04-10 NOTE — PROGRESS NOTES
"Outpatient Oncology Nutrition     Reason for Visit:     Oncology Nutrition Screening and Patient Education    Patient Name:  Funmilayo Cantu    :  1955    MRN:  [id number]    Date of Encounter: 04/10/2025    Nutrition Assessment     Diagnosis: Malignant neoplasm of upper-inner quadrant of left breast in female, estrogen receptor positive     Surgery: lumpectomy and sentinel lymph node biopsy 2025     Chemotherapy: follow up out to 2 weeks after radiation completes with Dr. Doran    Radiation: 40.05 Cantu delivered over 3 weeks with deep inspiration breath-hold technique. An additional 10 Gray boost will be given to the lumpectomy cavity     Patient Active Problem List:    Patient Active Problem List   Diagnosis    Precordial pain    Essential hypertension    Fatigue    Hyperlipidemia    Mild obesity    Osteoarthritis    Osteopenia    Premature ventricular contractions    Degenerative arthritis of right knee    GERD without esophagitis    S/P total knee arthroplasty, right    Malignant neoplasm of upper-inner quadrant of left breast in female, estrogen receptor positive       Food / Nutrition Related History       Hydration Status   Discussed the importance of hydration, reviewed hydrating fluids, and recommended she increase her intake. RD also discussed electrolytes and electrolyte powder.     Goal: 85 ounces    Enteral Feeding       Anthropometric Measurements     Height:    Ht Readings from Last 1 Encounters:   25 154.9 cm (61\")       Weight:    Wt Readings from Last 1 Encounters:   25 77.1 kg (170 lb)       BMI:  32.12 Overweight    Weight Change:  Stable since Dec 2024; ~171#  4/10/25- 173#    Review of Lab Data (Time Frame - 1 month / 2 month)   Reviewed 4/10/25    Medication Review   Reviewed MAR 4/10/25    Nutrition Focused Physical Findings       Nutrition Impact Symptoms   No problems with eating       Physical Activity      Normal with no limitations    Current Nutritional " Intake     Oral diet: Regular    Oral nutritional supplements: none at this time    Intake: oral intake has been normal     Malnutrition Risk Assessment     Recent weight loss over the past 6 months:  0 = No    Eating poorly because of a decreased appetite:  0 = No    Malnutrition Screening Score:     MST = 0 or 1 Patient not at risk for malnutrition    Nutrition Diagnosis     Problem    Etiology    Signs / Symptoms      Nutrition Intervention   Reviewed the importance of good nutrition during her treatment course focusing on adequate calorie, protein, nutrient and fluid intake. Advised her to be consuming smaller more frequent meals/snacks throughout the day to aid with potential nausea management. Emphasized the importance of protein and its role in the diet; reviewed high protein foods; and recommended she have a protein source at each meal/snack. Encouraged her to try drinking an ONS as needed to aid with calorie / protein intake.  asked about Ensure, TIMOTHY and patient and her  discussed different option for protein shakes / ONS's.     Patient education material provided-  Nutrition and Lifestyle Changes  Nutritional Considerations in Breast Cancer    Goal   To achieve adequate nutritional and hydration intake.    To aid with nutrition impact symptom management as needed.    Monitoring / Evaluation   Answered their questions and both voiced understanding of information discussed. RD's contact information provided and encouraged to call with questions. Will follow up as indicated.    Jesusita Maya, TIMOTHY, LD

## 2025-04-11 ENCOUNTER — HOSPITAL ENCOUNTER (OUTPATIENT)
Facility: HOSPITAL | Age: 70
Discharge: HOME | End: 2025-04-11

## 2025-04-11 LAB
RAD ONC ARIA COURSE ID: 1
RAD ONC ARIA COURSE INTENT: NORMAL
RAD ONC ARIA COURSE LAST TREATMENT DATE: NORMAL
RAD ONC ARIA COURSE START DATE: NORMAL
RAD ONC ARIA COURSE TREATMENT ELAPSED DAYS: 1
RAD ONC ARIA FIRST TREATMENT DATE: NORMAL
RAD ONC ARIA PLAN FRACTIONS TREATED TO DATE: 2
RAD ONC ARIA PLAN ID: NORMAL
RAD ONC ARIA PLAN NAME: NORMAL
RAD ONC ARIA PLAN PRESCRIBED DOSE PER FRACTION: 2.67 GY
RAD ONC ARIA PLAN PRIMARY REFERENCE POINT: NORMAL
RAD ONC ARIA PLAN TOTAL FRACTIONS PRESCRIBED: 15
RAD ONC ARIA PLAN TOTAL PRESCRIBED DOSE: 4005 CGY
RAD ONC ARIA REFERENCE POINT DOSAGE GIVEN TO DATE: 5.34 GY
RAD ONC ARIA REFERENCE POINT ID: NORMAL
RAD ONC ARIA REFERENCE POINT SESSION DOSAGE GIVEN: 2.67 GY

## 2025-04-11 PROCEDURE — 77387 GUIDANCE FOR RADJ TX DLVR: CPT | Performed by: RADIOLOGY

## 2025-04-11 PROCEDURE — 77412 RADIATION TX DELIVERY LVL 3: CPT | Performed by: RADIOLOGY

## 2025-04-14 ENCOUNTER — HOSPITAL ENCOUNTER (OUTPATIENT)
Facility: HOSPITAL | Age: 70
Discharge: HOME | End: 2025-04-14
Payer: MEDICARE

## 2025-04-14 LAB
RAD ONC ARIA COURSE ID: 1
RAD ONC ARIA COURSE INTENT: NORMAL
RAD ONC ARIA COURSE LAST TREATMENT DATE: NORMAL
RAD ONC ARIA COURSE START DATE: NORMAL
RAD ONC ARIA COURSE TREATMENT ELAPSED DAYS: 4
RAD ONC ARIA FIRST TREATMENT DATE: NORMAL
RAD ONC ARIA PLAN FRACTIONS TREATED TO DATE: 3
RAD ONC ARIA PLAN ID: NORMAL
RAD ONC ARIA PLAN NAME: NORMAL
RAD ONC ARIA PLAN PRESCRIBED DOSE PER FRACTION: 2.67 GY
RAD ONC ARIA PLAN PRIMARY REFERENCE POINT: NORMAL
RAD ONC ARIA PLAN TOTAL FRACTIONS PRESCRIBED: 15
RAD ONC ARIA PLAN TOTAL PRESCRIBED DOSE: 4005 CGY
RAD ONC ARIA REFERENCE POINT DOSAGE GIVEN TO DATE: 8.01 GY
RAD ONC ARIA REFERENCE POINT ID: NORMAL
RAD ONC ARIA REFERENCE POINT SESSION DOSAGE GIVEN: 2.67 GY

## 2025-04-14 PROCEDURE — 77387 GUIDANCE FOR RADJ TX DLVR: CPT | Performed by: RADIOLOGY

## 2025-04-14 PROCEDURE — 77412 RADIATION TX DELIVERY LVL 3: CPT | Performed by: RADIOLOGY

## 2025-04-15 ENCOUNTER — HOSPITAL ENCOUNTER (OUTPATIENT)
Facility: HOSPITAL | Age: 70
Discharge: HOME | End: 2025-04-15

## 2025-04-15 LAB
RAD ONC ARIA COURSE ID: 1
RAD ONC ARIA COURSE INTENT: NORMAL
RAD ONC ARIA COURSE LAST TREATMENT DATE: NORMAL
RAD ONC ARIA COURSE START DATE: NORMAL
RAD ONC ARIA COURSE TREATMENT ELAPSED DAYS: 5
RAD ONC ARIA FIRST TREATMENT DATE: NORMAL
RAD ONC ARIA PLAN FRACTIONS TREATED TO DATE: 4
RAD ONC ARIA PLAN ID: NORMAL
RAD ONC ARIA PLAN NAME: NORMAL
RAD ONC ARIA PLAN PRESCRIBED DOSE PER FRACTION: 2.67 GY
RAD ONC ARIA PLAN PRIMARY REFERENCE POINT: NORMAL
RAD ONC ARIA PLAN TOTAL FRACTIONS PRESCRIBED: 15
RAD ONC ARIA PLAN TOTAL PRESCRIBED DOSE: 4005 CGY
RAD ONC ARIA REFERENCE POINT DOSAGE GIVEN TO DATE: 10.68 GY
RAD ONC ARIA REFERENCE POINT ID: NORMAL
RAD ONC ARIA REFERENCE POINT SESSION DOSAGE GIVEN: 2.67 GY

## 2025-04-15 PROCEDURE — 77412 RADIATION TX DELIVERY LVL 3: CPT | Performed by: RADIOLOGY

## 2025-04-15 PROCEDURE — 77387 GUIDANCE FOR RADJ TX DLVR: CPT | Performed by: RADIOLOGY

## 2025-04-16 ENCOUNTER — HOSPITAL ENCOUNTER (OUTPATIENT)
Facility: HOSPITAL | Age: 70
Discharge: HOME | End: 2025-04-16

## 2025-04-16 LAB
RAD ONC ARIA COURSE ID: 1
RAD ONC ARIA COURSE INTENT: NORMAL
RAD ONC ARIA COURSE LAST TREATMENT DATE: NORMAL
RAD ONC ARIA COURSE START DATE: NORMAL
RAD ONC ARIA COURSE TREATMENT ELAPSED DAYS: 6
RAD ONC ARIA FIRST TREATMENT DATE: NORMAL
RAD ONC ARIA PLAN FRACTIONS TREATED TO DATE: 5
RAD ONC ARIA PLAN ID: NORMAL
RAD ONC ARIA PLAN NAME: NORMAL
RAD ONC ARIA PLAN PRESCRIBED DOSE PER FRACTION: 2.67 GY
RAD ONC ARIA PLAN PRIMARY REFERENCE POINT: NORMAL
RAD ONC ARIA PLAN TOTAL FRACTIONS PRESCRIBED: 15
RAD ONC ARIA PLAN TOTAL PRESCRIBED DOSE: 4005 CGY
RAD ONC ARIA REFERENCE POINT DOSAGE GIVEN TO DATE: 13.35 GY
RAD ONC ARIA REFERENCE POINT ID: NORMAL
RAD ONC ARIA REFERENCE POINT SESSION DOSAGE GIVEN: 2.67 GY

## 2025-04-16 PROCEDURE — 77336 RADIATION PHYSICS CONSULT: CPT | Performed by: RADIOLOGY

## 2025-04-16 PROCEDURE — 77387 GUIDANCE FOR RADJ TX DLVR: CPT | Performed by: RADIOLOGY

## 2025-04-16 PROCEDURE — 77412 RADIATION TX DELIVERY LVL 3: CPT | Performed by: RADIOLOGY

## 2025-04-17 ENCOUNTER — HOSPITAL ENCOUNTER (OUTPATIENT)
Facility: HOSPITAL | Age: 70
Discharge: HOME | End: 2025-04-17

## 2025-04-17 ENCOUNTER — DOCUMENTATION (OUTPATIENT)
Dept: NUTRITION | Facility: HOSPITAL | Age: 70
End: 2025-04-17
Payer: MEDICARE

## 2025-04-17 VITALS — BODY MASS INDEX: 31.93 KG/M2 | WEIGHT: 169 LBS

## 2025-04-17 LAB
RAD ONC ARIA COURSE ID: 1
RAD ONC ARIA COURSE INTENT: NORMAL
RAD ONC ARIA COURSE LAST TREATMENT DATE: NORMAL
RAD ONC ARIA COURSE START DATE: NORMAL
RAD ONC ARIA COURSE TREATMENT ELAPSED DAYS: 7
RAD ONC ARIA FIRST TREATMENT DATE: NORMAL
RAD ONC ARIA PLAN FRACTIONS TREATED TO DATE: 6
RAD ONC ARIA PLAN ID: NORMAL
RAD ONC ARIA PLAN NAME: NORMAL
RAD ONC ARIA PLAN PRESCRIBED DOSE PER FRACTION: 2.67 GY
RAD ONC ARIA PLAN PRIMARY REFERENCE POINT: NORMAL
RAD ONC ARIA PLAN TOTAL FRACTIONS PRESCRIBED: 15
RAD ONC ARIA PLAN TOTAL PRESCRIBED DOSE: 4005 CGY
RAD ONC ARIA REFERENCE POINT DOSAGE GIVEN TO DATE: 16.02 GY
RAD ONC ARIA REFERENCE POINT ID: NORMAL
RAD ONC ARIA REFERENCE POINT SESSION DOSAGE GIVEN: 2.67 GY

## 2025-04-17 PROCEDURE — 77387 GUIDANCE FOR RADJ TX DLVR: CPT | Performed by: RADIOLOGY

## 2025-04-17 PROCEDURE — 77412 RADIATION TX DELIVERY LVL 3: CPT | Performed by: RADIOLOGY

## 2025-04-17 NOTE — PROGRESS NOTES
ONC Nutrition    Diagnosis: Malignant neoplasm of upper-inner quadrant of left breast in female, estrogen receptor positive      Surgery: lumpectomy and sentinel lymph node biopsy 2/28/2025      Chemotherapy: follow up out to 2 weeks after radiation completes with Dr. Doran     Radiation: 40.05 Cantu delivered over 3 weeks with deep inspiration breath-hold technique. An additional 10 Gray boost will be given to the lumpectomy cavity     Weight:  4/10/25- 173#   4/17/25- 169#    Patient completed 6/15 + 5 boost treatments today.      Patient reports the past week has gone good. She reports the smaller more frequent meals are going good, as well as, the hydrating fluids. Patient complains of a slight increase in fatigue but no other nutrition impact symptoms at this time.     All questions/concerns addressed at this time.    Will follow as indicated.     Jesusita Maya RD, LD

## 2025-04-18 PROCEDURE — 77387 GUIDANCE FOR RADJ TX DLVR: CPT | Performed by: RADIOLOGY

## 2025-04-18 PROCEDURE — 77412 RADIATION TX DELIVERY LVL 3: CPT | Performed by: RADIOLOGY

## 2025-04-20 LAB
RAD ONC ARIA COURSE ID: 1
RAD ONC ARIA COURSE INTENT: NORMAL
RAD ONC ARIA COURSE LAST TREATMENT DATE: NORMAL
RAD ONC ARIA COURSE START DATE: NORMAL
RAD ONC ARIA COURSE TREATMENT ELAPSED DAYS: 8
RAD ONC ARIA FIRST TREATMENT DATE: NORMAL
RAD ONC ARIA PLAN FRACTIONS TREATED TO DATE: 7
RAD ONC ARIA PLAN ID: NORMAL
RAD ONC ARIA PLAN NAME: NORMAL
RAD ONC ARIA PLAN PRESCRIBED DOSE PER FRACTION: 2.67 GY
RAD ONC ARIA PLAN PRIMARY REFERENCE POINT: NORMAL
RAD ONC ARIA PLAN TOTAL FRACTIONS PRESCRIBED: 15
RAD ONC ARIA PLAN TOTAL PRESCRIBED DOSE: 4005 CGY
RAD ONC ARIA REFERENCE POINT DOSAGE GIVEN TO DATE: 18.69 GY
RAD ONC ARIA REFERENCE POINT ID: NORMAL
RAD ONC ARIA REFERENCE POINT SESSION DOSAGE GIVEN: 2.67 GY

## 2025-04-21 ENCOUNTER — HOSPITAL ENCOUNTER (OUTPATIENT)
Facility: HOSPITAL | Age: 70
Discharge: HOME | End: 2025-04-21
Payer: MEDICARE

## 2025-04-21 LAB
RAD ONC ARIA COURSE ID: 1
RAD ONC ARIA COURSE INTENT: NORMAL
RAD ONC ARIA COURSE LAST TREATMENT DATE: NORMAL
RAD ONC ARIA COURSE START DATE: NORMAL
RAD ONC ARIA COURSE TREATMENT ELAPSED DAYS: 11
RAD ONC ARIA FIRST TREATMENT DATE: NORMAL
RAD ONC ARIA PLAN FRACTIONS TREATED TO DATE: 8
RAD ONC ARIA PLAN ID: NORMAL
RAD ONC ARIA PLAN NAME: NORMAL
RAD ONC ARIA PLAN PRESCRIBED DOSE PER FRACTION: 2.67 GY
RAD ONC ARIA PLAN PRIMARY REFERENCE POINT: NORMAL
RAD ONC ARIA PLAN TOTAL FRACTIONS PRESCRIBED: 15
RAD ONC ARIA PLAN TOTAL PRESCRIBED DOSE: 4005 CGY
RAD ONC ARIA REFERENCE POINT DOSAGE GIVEN TO DATE: 21.36 GY
RAD ONC ARIA REFERENCE POINT ID: NORMAL
RAD ONC ARIA REFERENCE POINT SESSION DOSAGE GIVEN: 2.67 GY

## 2025-04-21 PROCEDURE — 77387 GUIDANCE FOR RADJ TX DLVR: CPT | Performed by: RADIOLOGY

## 2025-04-21 PROCEDURE — 77412 RADIATION TX DELIVERY LVL 3: CPT | Performed by: RADIOLOGY

## 2025-04-22 ENCOUNTER — HOSPITAL ENCOUNTER (OUTPATIENT)
Facility: HOSPITAL | Age: 70
Discharge: HOME | End: 2025-04-22

## 2025-04-22 LAB
RAD ONC ARIA COURSE ID: 1
RAD ONC ARIA COURSE INTENT: NORMAL
RAD ONC ARIA COURSE LAST TREATMENT DATE: NORMAL
RAD ONC ARIA COURSE START DATE: NORMAL
RAD ONC ARIA COURSE TREATMENT ELAPSED DAYS: 12
RAD ONC ARIA FIRST TREATMENT DATE: NORMAL
RAD ONC ARIA PLAN FRACTIONS TREATED TO DATE: 9
RAD ONC ARIA PLAN ID: NORMAL
RAD ONC ARIA PLAN NAME: NORMAL
RAD ONC ARIA PLAN PRESCRIBED DOSE PER FRACTION: 2.67 GY
RAD ONC ARIA PLAN PRIMARY REFERENCE POINT: NORMAL
RAD ONC ARIA PLAN TOTAL FRACTIONS PRESCRIBED: 15
RAD ONC ARIA PLAN TOTAL PRESCRIBED DOSE: 4005 CGY
RAD ONC ARIA REFERENCE POINT DOSAGE GIVEN TO DATE: 24.03 GY
RAD ONC ARIA REFERENCE POINT ID: NORMAL
RAD ONC ARIA REFERENCE POINT SESSION DOSAGE GIVEN: 2.67 GY

## 2025-04-22 PROCEDURE — 77412 RADIATION TX DELIVERY LVL 3: CPT | Performed by: RADIOLOGY

## 2025-04-22 PROCEDURE — 77387 GUIDANCE FOR RADJ TX DLVR: CPT | Performed by: RADIOLOGY

## 2025-04-23 ENCOUNTER — HOSPITAL ENCOUNTER (OUTPATIENT)
Facility: HOSPITAL | Age: 70
Discharge: HOME | End: 2025-04-23

## 2025-04-23 LAB
RAD ONC ARIA COURSE ID: 1
RAD ONC ARIA COURSE INTENT: NORMAL
RAD ONC ARIA COURSE LAST TREATMENT DATE: NORMAL
RAD ONC ARIA COURSE START DATE: NORMAL
RAD ONC ARIA COURSE TREATMENT ELAPSED DAYS: 13
RAD ONC ARIA FIRST TREATMENT DATE: NORMAL
RAD ONC ARIA PLAN FRACTIONS TREATED TO DATE: 10
RAD ONC ARIA PLAN ID: NORMAL
RAD ONC ARIA PLAN NAME: NORMAL
RAD ONC ARIA PLAN PRESCRIBED DOSE PER FRACTION: 2.67 GY
RAD ONC ARIA PLAN PRIMARY REFERENCE POINT: NORMAL
RAD ONC ARIA PLAN TOTAL FRACTIONS PRESCRIBED: 15
RAD ONC ARIA PLAN TOTAL PRESCRIBED DOSE: 4005 CGY
RAD ONC ARIA REFERENCE POINT DOSAGE GIVEN TO DATE: 26.7 GY
RAD ONC ARIA REFERENCE POINT ID: NORMAL
RAD ONC ARIA REFERENCE POINT SESSION DOSAGE GIVEN: 2.67 GY

## 2025-04-23 PROCEDURE — 77336 RADIATION PHYSICS CONSULT: CPT | Performed by: RADIOLOGY

## 2025-04-23 PROCEDURE — 77387 GUIDANCE FOR RADJ TX DLVR: CPT | Performed by: RADIOLOGY

## 2025-04-23 PROCEDURE — 77412 RADIATION TX DELIVERY LVL 3: CPT | Performed by: RADIOLOGY

## 2025-04-24 ENCOUNTER — HOSPITAL ENCOUNTER (OUTPATIENT)
Facility: HOSPITAL | Age: 70
Discharge: HOME | End: 2025-04-24

## 2025-04-24 ENCOUNTER — DOCUMENTATION (OUTPATIENT)
Dept: NUTRITION | Facility: HOSPITAL | Age: 70
End: 2025-04-24
Payer: MEDICARE

## 2025-04-24 VITALS — WEIGHT: 169 LBS | BODY MASS INDEX: 31.93 KG/M2

## 2025-04-24 LAB
RAD ONC ARIA COURSE ID: 1
RAD ONC ARIA COURSE INTENT: NORMAL
RAD ONC ARIA COURSE LAST TREATMENT DATE: NORMAL
RAD ONC ARIA COURSE START DATE: NORMAL
RAD ONC ARIA COURSE TREATMENT ELAPSED DAYS: 14
RAD ONC ARIA FIRST TREATMENT DATE: NORMAL
RAD ONC ARIA PLAN FRACTIONS TREATED TO DATE: 11
RAD ONC ARIA PLAN ID: NORMAL
RAD ONC ARIA PLAN NAME: NORMAL
RAD ONC ARIA PLAN PRESCRIBED DOSE PER FRACTION: 2.67 GY
RAD ONC ARIA PLAN PRIMARY REFERENCE POINT: NORMAL
RAD ONC ARIA PLAN TOTAL FRACTIONS PRESCRIBED: 15
RAD ONC ARIA PLAN TOTAL PRESCRIBED DOSE: 4005 CGY
RAD ONC ARIA REFERENCE POINT DOSAGE GIVEN TO DATE: 29.37 GY
RAD ONC ARIA REFERENCE POINT ID: NORMAL
RAD ONC ARIA REFERENCE POINT SESSION DOSAGE GIVEN: 2.67 GY

## 2025-04-24 PROCEDURE — 77412 RADIATION TX DELIVERY LVL 3: CPT | Performed by: RADIOLOGY

## 2025-04-24 PROCEDURE — 77387 GUIDANCE FOR RADJ TX DLVR: CPT | Performed by: RADIOLOGY

## 2025-04-24 PROCEDURE — 77300 RADIATION THERAPY DOSE PLAN: CPT | Performed by: RADIOLOGY

## 2025-04-24 PROCEDURE — 77290 THER RAD SIMULAJ FIELD CPLX: CPT | Performed by: RADIOLOGY

## 2025-04-24 PROCEDURE — 77334 RADIATION TREATMENT AID(S): CPT | Performed by: RADIOLOGY

## 2025-04-24 NOTE — PROGRESS NOTES
ONC Nutrition     Diagnosis: Malignant neoplasm of upper-inner quadrant of left breast in female, estrogen receptor positive      Surgery: lumpectomy and sentinel lymph node biopsy 2/28/2025      Chemotherapy: follow up out to 2 weeks after radiation completes with Dr. Doran     Radiation: 40.05 Cantu delivered over 3 weeks with deep inspiration breath-hold technique. An additional 10 Gray boost will be given to the lumpectomy cavity      Weight:  4/10/25- 173#   4/17/25- 169#  4/24/25- 169#     Patient completed 11/15 + 5 boost treatments today.       Patient reports the past week has gone good. She does report headaches most days. She says her hydrating fluids isn't great, RD and patient discussed options to help increase hydrating fluids and provided examples of electrolytes.      All questions/concerns addressed at this time.     Will follow as indicated.      Jesusita Maya, TIMOTHY, LD

## 2025-04-25 ENCOUNTER — HOSPITAL ENCOUNTER (OUTPATIENT)
Facility: HOSPITAL | Age: 70
Discharge: HOME | End: 2025-04-25

## 2025-04-25 LAB
RAD ONC ARIA COURSE ID: 1
RAD ONC ARIA COURSE INTENT: NORMAL
RAD ONC ARIA COURSE LAST TREATMENT DATE: NORMAL
RAD ONC ARIA COURSE START DATE: NORMAL
RAD ONC ARIA COURSE TREATMENT ELAPSED DAYS: 15
RAD ONC ARIA FIRST TREATMENT DATE: NORMAL
RAD ONC ARIA PLAN FRACTIONS TREATED TO DATE: 12
RAD ONC ARIA PLAN ID: NORMAL
RAD ONC ARIA PLAN NAME: NORMAL
RAD ONC ARIA PLAN PRESCRIBED DOSE PER FRACTION: 2.67 GY
RAD ONC ARIA PLAN PRIMARY REFERENCE POINT: NORMAL
RAD ONC ARIA PLAN TOTAL FRACTIONS PRESCRIBED: 15
RAD ONC ARIA PLAN TOTAL PRESCRIBED DOSE: 4005 CGY
RAD ONC ARIA REFERENCE POINT DOSAGE GIVEN TO DATE: 32.04 GY
RAD ONC ARIA REFERENCE POINT ID: NORMAL
RAD ONC ARIA REFERENCE POINT SESSION DOSAGE GIVEN: 2.67 GY

## 2025-04-25 PROCEDURE — 77412 RADIATION TX DELIVERY LVL 3: CPT | Performed by: RADIOLOGY

## 2025-04-25 PROCEDURE — 77387 GUIDANCE FOR RADJ TX DLVR: CPT | Performed by: RADIOLOGY

## 2025-04-28 ENCOUNTER — HOSPITAL ENCOUNTER (OUTPATIENT)
Facility: HOSPITAL | Age: 70
Discharge: HOME | End: 2025-04-28
Payer: MEDICARE

## 2025-04-28 LAB
RAD ONC ARIA COURSE ID: 1
RAD ONC ARIA COURSE INTENT: NORMAL
RAD ONC ARIA COURSE LAST TREATMENT DATE: NORMAL
RAD ONC ARIA COURSE START DATE: NORMAL
RAD ONC ARIA COURSE TREATMENT ELAPSED DAYS: 18
RAD ONC ARIA FIRST TREATMENT DATE: NORMAL
RAD ONC ARIA PLAN FRACTIONS TREATED TO DATE: 13
RAD ONC ARIA PLAN ID: NORMAL
RAD ONC ARIA PLAN NAME: NORMAL
RAD ONC ARIA PLAN PRESCRIBED DOSE PER FRACTION: 2.67 GY
RAD ONC ARIA PLAN PRIMARY REFERENCE POINT: NORMAL
RAD ONC ARIA PLAN TOTAL FRACTIONS PRESCRIBED: 15
RAD ONC ARIA PLAN TOTAL PRESCRIBED DOSE: 4005 CGY
RAD ONC ARIA REFERENCE POINT DOSAGE GIVEN TO DATE: 34.71 GY
RAD ONC ARIA REFERENCE POINT ID: NORMAL
RAD ONC ARIA REFERENCE POINT SESSION DOSAGE GIVEN: 2.67 GY

## 2025-04-28 PROCEDURE — 77412 RADIATION TX DELIVERY LVL 3: CPT | Performed by: RADIOLOGY

## 2025-04-28 PROCEDURE — 77387 GUIDANCE FOR RADJ TX DLVR: CPT | Performed by: RADIOLOGY

## 2025-04-29 ENCOUNTER — HOSPITAL ENCOUNTER (OUTPATIENT)
Facility: HOSPITAL | Age: 70
Discharge: HOME | End: 2025-04-29

## 2025-04-29 LAB
RAD ONC ARIA COURSE ID: 1
RAD ONC ARIA COURSE INTENT: NORMAL
RAD ONC ARIA COURSE LAST TREATMENT DATE: NORMAL
RAD ONC ARIA COURSE START DATE: NORMAL
RAD ONC ARIA COURSE TREATMENT ELAPSED DAYS: 19
RAD ONC ARIA FIRST TREATMENT DATE: NORMAL
RAD ONC ARIA PLAN FRACTIONS TREATED TO DATE: 14
RAD ONC ARIA PLAN ID: NORMAL
RAD ONC ARIA PLAN NAME: NORMAL
RAD ONC ARIA PLAN PRESCRIBED DOSE PER FRACTION: 2.67 GY
RAD ONC ARIA PLAN PRIMARY REFERENCE POINT: NORMAL
RAD ONC ARIA PLAN TOTAL FRACTIONS PRESCRIBED: 15
RAD ONC ARIA PLAN TOTAL PRESCRIBED DOSE: 4005 CGY
RAD ONC ARIA REFERENCE POINT DOSAGE GIVEN TO DATE: 37.38 GY
RAD ONC ARIA REFERENCE POINT ID: NORMAL
RAD ONC ARIA REFERENCE POINT SESSION DOSAGE GIVEN: 2.67 GY

## 2025-04-29 PROCEDURE — 77387 GUIDANCE FOR RADJ TX DLVR: CPT | Performed by: RADIOLOGY

## 2025-04-29 PROCEDURE — 77412 RADIATION TX DELIVERY LVL 3: CPT | Performed by: RADIOLOGY

## 2025-04-30 ENCOUNTER — OFFICE VISIT (OUTPATIENT)
Dept: ONCOLOGY | Facility: CLINIC | Age: 70
End: 2025-04-30
Payer: MEDICARE

## 2025-04-30 ENCOUNTER — HOSPITAL ENCOUNTER (OUTPATIENT)
Facility: HOSPITAL | Age: 70
Discharge: HOME | End: 2025-04-30

## 2025-04-30 VITALS
RESPIRATION RATE: 12 BRPM | WEIGHT: 170.6 LBS | DIASTOLIC BLOOD PRESSURE: 82 MMHG | OXYGEN SATURATION: 97 % | HEIGHT: 61 IN | BODY MASS INDEX: 32.21 KG/M2 | HEART RATE: 62 BPM | TEMPERATURE: 97.5 F | SYSTOLIC BLOOD PRESSURE: 134 MMHG

## 2025-04-30 DIAGNOSIS — Z79.811 AROMATASE INHIBITOR USE: ICD-10-CM

## 2025-04-30 DIAGNOSIS — M85.80 OSTEOPENIA, UNSPECIFIED LOCATION: Primary | ICD-10-CM

## 2025-04-30 DIAGNOSIS — Z17.0 MALIGNANT NEOPLASM OF UPPER-INNER QUADRANT OF LEFT BREAST IN FEMALE, ESTROGEN RECEPTOR POSITIVE: ICD-10-CM

## 2025-04-30 DIAGNOSIS — C50.212 MALIGNANT NEOPLASM OF UPPER-INNER QUADRANT OF LEFT BREAST IN FEMALE, ESTROGEN RECEPTOR POSITIVE: ICD-10-CM

## 2025-04-30 LAB
RAD ONC ARIA COURSE ID: 1
RAD ONC ARIA COURSE INTENT: NORMAL
RAD ONC ARIA COURSE LAST TREATMENT DATE: NORMAL
RAD ONC ARIA COURSE START DATE: NORMAL
RAD ONC ARIA COURSE TREATMENT ELAPSED DAYS: 20
RAD ONC ARIA FIRST TREATMENT DATE: NORMAL
RAD ONC ARIA PLAN FRACTIONS TREATED TO DATE: 15
RAD ONC ARIA PLAN ID: NORMAL
RAD ONC ARIA PLAN NAME: NORMAL
RAD ONC ARIA PLAN PRESCRIBED DOSE PER FRACTION: 2.67 GY
RAD ONC ARIA PLAN PRIMARY REFERENCE POINT: NORMAL
RAD ONC ARIA PLAN TOTAL FRACTIONS PRESCRIBED: 15
RAD ONC ARIA PLAN TOTAL PRESCRIBED DOSE: 4005 CGY
RAD ONC ARIA REFERENCE POINT DOSAGE GIVEN TO DATE: 40.05 GY
RAD ONC ARIA REFERENCE POINT ID: NORMAL
RAD ONC ARIA REFERENCE POINT SESSION DOSAGE GIVEN: 2.67 GY

## 2025-04-30 PROCEDURE — 77412 RADIATION TX DELIVERY LVL 3: CPT | Performed by: RADIOLOGY

## 2025-04-30 PROCEDURE — 77336 RADIATION PHYSICS CONSULT: CPT | Performed by: RADIOLOGY

## 2025-04-30 PROCEDURE — 77387 GUIDANCE FOR RADJ TX DLVR: CPT | Performed by: RADIOLOGY

## 2025-04-30 RX ORDER — ANASTROZOLE 1 MG/1
1 TABLET ORAL DAILY
Qty: 30 TABLET | Refills: 3 | Status: SHIPPED | OUTPATIENT
Start: 2025-04-30

## 2025-04-30 NOTE — PROGRESS NOTES
Hematology and Oncology [place]  Office number [phone]    Fax number [phone]     Follow up     Date: 2025     Patient Name: Funmilayo Cantu  MRN: [id number]  : 1955    Referring Physician: Dr. Kala Buitrago    Chief Complaint: Left breast cancer    Cancer Staging: IA    History of Present Illness: Funmilayo Cantu is a pleasant 69 y.o. female who presents today for evaluation of left breast cancer.     Screening mammogram 24 showed a 6 mm focal asymmetry in the LUIQ prompting further workup.     Left breast biopsy 24 showed invasive ductal carcinoma with lobular features, grade 2 (%, %, HER 2 negative 0+).     Left breast lumpectomy 25 showed 8 mm invasive ductal carcinoma grade 2 with margins negative by < 1 mm. [place] lymph nodes negative (0/1). Oncotype 6    Treatment history:  Radiation to complete 25  Anastrozole, planning 2025     Interval history:  She returns for discussion of AI initiation.  Tolerating radiation well. Minimal radiation skin changes. Otherwise doing well.      Past Medical History:   Past Medical History:   Diagnosis Date    Breast cancer     Fracture of wrist     GERD (gastroesophageal reflux disease)     Hip arthrosis 2021    Hyperlipidemia     Hypertension     Kidney stones     HX OF    Knee swelling     Low back strain     Mild obesity 2017    Neck strain ?    Nephrolithiasis     Osteoarthritis 2017    Osteopenia 2017    Premature ventricular contractions 2017    with appropriate suppression on stress echocardiography.    Tendinitis of knee ?    Maybe   No personal history of myocardial infarction, cerebrovascular event, or venous thromboembolism.  Osteopenia    Past Surgical History:   Past Surgical History:   Procedure Laterality Date    BREAST LUMPECTOMY      CATARACT EXTRACTION Bilateral     CHOLECYSTECTOMY      COLONOSCOPY      DENTAL PROCEDURE      [place] teeth extraction.     FOOT SURGERY      Remove ingrown toenail    KIDNEY STONE SURGERY      Lithotripsy x2.    KNEE ARTHROSCOPY Right     TONSILLECTOMY      TOTAL KNEE ARTHROPLASTY Right 2024    Procedure: TOTAL KNEE ARTHROPLASTY WITH CORI ROBOT - RIGHT;  Surgeon: Irvin Del Cid MD;  Location: [facility];  Service: Robotics - Ortho;  Laterality: Right;       Family History:   Family History   Problem Relation Age of Onset    Heart disease Mother     Cancer Mother         Non-Hodgkin’s Lymphoma    Heart attack Father 70    Ovarian cancer Sister 49    Cancer Sister         Ovarian    Hyperlipidemia Brother     Diabetes Brother     Heart attack Maternal Grandmother     Heart attack Maternal Grandfather     Heart disease Paternal Grandmother     Cancer Paternal Grandfather         Mouth    Breast cancer Other 30       Social History:   Social History     Socioeconomic History    Marital status:    Tobacco Use    Smoking status: Former     Current packs/day: 0.00     Average packs/day: 0.5 packs/day for 6.7 years (3.3 ttl pk-yrs)     Types: Cigarettes     Start date: 9/10/1969     Quit date: 1976     Years since quittin.9    Smokeless tobacco: Never    Tobacco comments:     High school + one year college   Vaping Use    Vaping status: Never Used   Substance and Sexual Activity    Alcohol use: Yes     Alcohol/week: 2.0 standard drinks of alcohol     Types: 2 Drinks containing 0.5 oz of alcohol per week     Comment: Ulcer. 2 COCKTAILS IN THE EVENING    Drug use: No    Sexual activity: Yes     Partners: Male     Birth control/protection: Post-menopausal       Medications:     Current Outpatient Medications:     amLODIPine (NORVASC) 5 MG tablet, Take 1 tablet by mouth Daily., Disp: , Rfl:     aspirin 81 MG EC tablet, Take 1 tablet by mouth Daily., Disp: , Rfl:     atorvastatin (LIPITOR) 10 MG tablet, Take 1 tablet by mouth every night at bedtime., Disp: , Rfl:     cholecalciferol (VITAMIN D3) 1000 units tablet, Take  "1 tablet by mouth Daily., Disp: , Rfl:     famotidine (PEPCID) 40 MG tablet, , Disp: , Rfl:     losartan (COZAAR) 50 MG tablet, Take 1 tablet by mouth Daily., Disp: , Rfl:     Restasis 0.05 % ophthalmic emulsion, , Disp: , Rfl:     Allergies:   No Known Allergies    Objective     Vital Signs:   Vitals:    04/30/25 1008   BP: 134/82   Pulse: 62   Resp: 12   Temp: 97.5 °F (36.4 °C)   TempSrc: Infrared   SpO2: 97%   Weight: 77.4 kg (170 lb 9.6 oz)   Height: 154.9 cm (61\")  Comment: per pt   PainSc: 4    PainLoc: Breast  Comment: flares up after treatment    Body mass index is 32.23 kg/m².   Pain Score    04/30/25 1008   PainSc: 4    PainLoc: Breast  Comment: flares up after treatment       ECOG Performance Status: 0 - Asymptomatic    Physical Exam:   General: No acute distress. Well appearing   HEENT: Normocephalic, atraumatic. Sclera anicteric.   Neck: supple, no adenopathy.   Cardiovascular: regular rate and rhythm. No murmurs.   Respiratory: Normal rate. Clear to auscultation bilaterally  Abdomen: Soft, nontender, non distended with normoactive bowel sounds  Lymph: no cervical, supraclavicular or axillary adenopathy  Neuro: Alert and oriented x 3. No focal deficits.     Laboratory/Imaging Reviewed:   Hospital Outpatient Visit on 04/30/2025   Component Date Value Ref Range Status    Course ID 04/30/2025 1   Final    Course Intent 04/30/2025 Post-op   Final    Course Start Date 04/30/2025 3/6/2025 12:43 PM   Final    Course First Treatment Date 04/30/2025 4/10/2025  3:10 PM   Final    Course Last Treatment Date 04/30/2025 4/30/2025  8:17 AM   Final    Course Elapsed Days 04/30/2025 20   Final    Reference Point ID 04/30/2025 Left Breast DIBH   Final    Reference Point Dosage Given to Da* 04/30/2025 40.05  Gy Final    Reference Point Session Dosage Giv* 04/30/2025 2.67  Gy Final    Plan ID 04/30/2025 LTBreast DIBH   Final    Plan Name 04/30/2025 LT Breast DIBH   Final    Plan Fractions Treated to Date 04/30/2025 15   " Final    Plan Total Fractions Prescribed 04/30/2025 15   Final    Plan Prescribed Dose Per Fraction 04/30/2025 2.67  Gy Final    Plan Total Prescribed Dose 04/30/2025 4,005  cGy Final    Plan Primary Reference Point 04/30/2025 Left Breast DIB   Final   Hospital Outpatient Visit on 04/29/2025   Component Date Value Ref Range Status    Course ID 04/29/2025 1   Final    Course Intent 04/29/2025 Post-op   Final    Course Start Date 04/29/2025 3/6/2025 12:43 PM   Final    Course First Treatment Date 04/29/2025 4/10/2025  3:10 PM   Final    Course Last Treatment Date 04/29/2025 4/29/2025  8:31 AM   Final    Course Elapsed Days 04/29/2025 19   Final    Reference Point ID 04/29/2025 Left Breast DIBH   Final    Reference Point Dosage Given to Da* 04/29/2025 37.38  Gy Final    Reference Point Session Dosage Giv* 04/29/2025 2.67  Gy Final    Plan ID 04/29/2025 LTBreast DIBH   Final    Plan Name 04/29/2025 LT Breast [facility]   Final    Plan Fractions Treated to Date 04/29/2025 14   Final    Plan Total Fractions Prescribed 04/29/2025 15   Final    Plan Prescribed Dose Per Fraction 04/29/2025 2.67  Gy Final    Plan Total Prescribed Dose 04/29/2025 4,005  cGy Final    Plan Primary Reference Point 04/29/2025 Left Breast [facility]   Final   Hospital Outpatient Visit on 04/28/2025   Component Date Value Ref Range Status    Course ID 04/28/2025 1   Final    Course Intent 04/28/2025 Post-op   Final    Course Start Date 04/28/2025 3/6/2025 12:43 PM   Final    Course First Treatment Date 04/28/2025 4/10/2025  3:10 PM   Final    Course Last Treatment Date 04/28/2025 4/28/2025  8:19 AM   Final    Course Elapsed Days 04/28/2025 18   Final    Reference Point ID 04/28/2025 Left Breast DIBH   Final    Reference Point Dosage Given to Da* 04/28/2025 34.71  Gy Final    Reference Point Session Dosage Giv* 04/28/2025 2.67  Gy Final    Plan ID 04/28/2025 LTBreast DIBH   Final    Plan Name 04/28/2025 LT Breast DIBH   Final    Plan Fractions Treated to Date  04/28/2025 13   Final    Plan Total Fractions Prescribed 04/28/2025 15   Final    Plan Prescribed Dose Per Fraction 04/28/2025 2.67  Gy Final    Plan Total Prescribed Dose 04/28/2025 4,005  cGy Final    Plan Primary Reference Point 04/28/2025 Left Breast DIBH   Final   Hospital Outpatient Visit on 04/25/2025   Component Date Value Ref Range Status    Course ID 04/25/2025 1   Final    Course Intent 04/25/2025 Post-op   Final    Course Start Date 04/25/2025 3/6/2025 12:43 PM   Final    Course First Treatment Date 04/25/2025 4/10/2025  3:10 PM   Final    Course Last Treatment Date 04/25/2025 4/25/2025  8:21 AM   Final    Course Elapsed Days 04/25/2025 15   Final    Reference Point ID 04/25/2025 Left Breast DIBH   Final    Reference Point Dosage Given to Da* 04/25/2025 32.04  Gy Final    Reference Point Session Dosage Giv* 04/25/2025 2.67  Gy Final    Plan ID 04/25/2025 LTBreast DIBH   Final    Plan Name 04/25/2025 LT Breast DIBH   Final    Plan Fractions Treated to Date 04/25/2025 12   Final    Plan Total Fractions Prescribed 04/25/2025 15   Final    Plan Prescribed Dose Per Fraction 04/25/2025 2.67  Gy Final    Plan Total Prescribed Dose 04/25/2025 4,005  cGy Final    Plan Primary Reference Point 04/25/2025 Left Breast DIBH   Final   Orders Only on 04/24/2025   Component Date Value Ref Range Status    Course ID 04/24/2025 1   Final    Course Intent 04/24/2025 Post-op   Final    Course Start Date 04/24/2025 3/6/2025 12:43 PM   Final    Course First Treatment Date 04/24/2025 4/10/2025  3:10 PM   Final    Course Last Treatment Date 04/24/2025 4/24/2025  8:15 AM   Final    Course Elapsed Days 04/24/2025 14   Final    Reference Point ID 04/24/2025 Left Breast DIBH   Final    Reference Point Dosage Given to Da* 04/24/2025 29.37  Gy Final    Reference Point Session Dosage Giv* 04/24/2025 2.67  Gy Final    Plan ID 04/24/2025 LTBreast DIBH   Final    Plan Name 04/24/2025 LT Breast [facility]   Final    Plan Fractions Treated to Date  04/24/2025 11   Final    Plan Total Fractions Prescribed 04/24/2025 15   Final    Plan Prescribed Dose Per Fraction 04/24/2025 2.67  Gy Final    Plan Total Prescribed Dose 04/24/2025 4,005  cGy Final    Plan Primary Reference Point 04/24/2025 Left Breast DIBH   Final   Hospital Outpatient Visit on 04/23/2025   Component Date Value Ref Range Status    Course ID 04/23/2025 1   Final    Course Intent 04/23/2025 Post-op   Final    Course Start Date 04/23/2025 3/6/2025 12:43 PM   Final    Course First Treatment Date 04/23/2025 4/10/2025  3:10 PM   Final    Course Last Treatment Date 04/23/2025 4/23/2025  8:22 AM   Final    Course Elapsed Days 04/23/2025 13   Final    Reference Point ID 04/23/2025 Left Breast DIBH   Final    Reference Point Dosage Given to Da* 04/23/2025 26.7  Gy Final    Reference Point Session Dosage Giv* 04/23/2025 2.67  Gy Final    Plan ID 04/23/2025 LTBreast DIBH   Final    Plan Name 04/23/2025 LT Breast DIBH   Final    Plan Fractions Treated to Date 04/23/2025 10   Final    Plan Total Fractions Prescribed 04/23/2025 15   Final    Plan Prescribed Dose Per Fraction 04/23/2025 2.67  Gy Final    Plan Total Prescribed Dose 04/23/2025 4,005  cGy Final    Plan Primary Reference Point 04/23/2025 Left Breast DIBH   Final   Hospital Outpatient Visit on 04/22/2025   Component Date Value Ref Range Status    Course ID 04/22/2025 1   Final    Course Intent 04/22/2025 Post-op   Final    Course Start Date 04/22/2025 3/6/2025 12:43 PM   Final    Course First Treatment Date 04/22/2025 4/10/2025  3:10 PM   Final    Course Last Treatment Date 04/22/2025 4/22/2025  8:19 AM   Final    Course Elapsed Days 04/22/2025 12   Final    Reference Point ID 04/22/2025 Left Breast DIBH   Final    Reference Point Dosage Given to Da* 04/22/2025 24.03  Gy Final    Reference Point Session Dosage Giv* 04/22/2025 2.67  Gy Final    Plan ID 04/22/2025 LTBreast DIBH   Final    Plan Name 04/22/2025 LT Breast DIBH   Final    Plan Fractions  Treated to Date 04/22/2025 9   Final    Plan Total Fractions Prescribed 04/22/2025 15   Final    Plan Prescribed Dose Per Fraction 04/22/2025 2.67  Gy Final    Plan Total Prescribed Dose 04/22/2025 4,005  cGy Final    Plan Primary Reference Point 04/22/2025 Left Breast DIBH   Final   Hospital Outpatient Visit on 04/21/2025   Component Date Value Ref Range Status    Course ID 04/21/2025 1   Final    Course Intent 04/21/2025 Post-op   Final    Course Start Date 04/21/2025 3/6/2025 12:43 PM   Final    Course First Treatment Date 04/21/2025 4/10/2025  3:10 PM   Final    Course Last Treatment Date 04/21/2025 4/21/2025  8:27 AM   Final    Course Elapsed Days 04/21/2025 11   Final    Reference Point ID 04/21/2025 Left Breast DIBH   Final    Reference Point Dosage Given to Da* 04/21/2025 21.36  Gy Final    Reference Point Session Dosage Giv* 04/21/2025 2.67  Gy Final    Plan ID 04/21/2025 LTBreast DIBH   Final    Plan Name 04/21/2025 LT Breast DIBH   Final    Plan Fractions Treated to Date 04/21/2025 8   Final    Plan Total Fractions Prescribed 04/21/2025 15   Final    Plan Prescribed Dose Per Fraction 04/21/2025 2.67  Gy Final    Plan Total Prescribed Dose 04/21/2025 4,005  cGy Final    Plan Primary Reference Point 04/21/2025 Left Breast DIBH   Final   Orders Only on 04/20/2025   Component Date Value Ref Range Status    Course ID 04/20/2025 1   Final    Course Intent 04/20/2025 Post-op   Final    Course Start Date 04/20/2025 3/6/2025 12:43 PM   Final    Course First Treatment Date 04/20/2025 4/10/2025  3:10 PM   Final    Course Last Treatment Date 04/20/2025 4/18/2025  8:14 AM   Final    Course Elapsed Days 04/20/2025 8   Final    Reference Point ID 04/20/2025 Left Breast DIBH   Final    Reference Point Dosage Given to Da* 04/20/2025 18.69  Gy Final    Reference Point Session Dosage Giv* 04/20/2025 2.67  Gy Final    Plan ID 04/20/2025 LTBreast DIBH   Final    Plan Name 04/20/2025 LT Breast [facility]   Final    Plan Fractions  Treated to Date 04/20/2025 7   Final    Plan Total Fractions Prescribed 04/20/2025 15   Final    Plan Prescribed Dose Per Fraction 04/20/2025 2.67  Gy Final    Plan Total Prescribed Dose 04/20/2025 4,005  cGy Final    Plan Primary Reference Point 04/20/2025 Left Breast DIBH   Final   Orders Only on 04/17/2025   Component Date Value Ref Range Status    Course ID 04/17/2025 1   Final    Course Intent 04/17/2025 Post-op   Final    Course Start Date 04/17/2025 3/6/2025 12:43 PM   Final    Course First Treatment Date 04/17/2025 4/10/2025  3:10 PM   Final    Course Last Treatment Date 04/17/2025 4/17/2025  8:21 AM   Final    Course Elapsed Days 04/17/2025 7   Final    Reference Point ID 04/17/2025 Left Breast DIBH   Final    Reference Point Dosage Given to Da* 04/17/2025 16.02  Gy Final    Reference Point Session Dosage Giv* 04/17/2025 2.67  Gy Final    Plan ID 04/17/2025 LTBreast DIBH   Final    Plan Name 04/17/2025 LT Breast DIBH   Final    Plan Fractions Treated to Date 04/17/2025 6   Final    Plan Total Fractions Prescribed 04/17/2025 15   Final    Plan Prescribed Dose Per Fraction 04/17/2025 2.67  Gy Final    Plan Total Prescribed Dose 04/17/2025 4,005  cGy Final    Plan Primary Reference Point 04/17/2025 Left Breast DIBH   Final   There may be more visits with results that are not included.       No results found.    Procedures    Assessment / Plan      Assessment/Plan:   Left breast cancer, stage IA, ER positive/HER 2 negative  Aromatase inhibitor use  I reviewed the patient's history, imaging and pathology.   We discussed staging, prognosis and general principles of breast cancer therapy.  She has an early stage, estrogen sensitive breast cancer and has undergone curative intent surgery.  She will benefit from 5-10 years of endocrine therapy to reduce her risk for disease recurrence given that this is an estrogen sensitive tumor.   -Oncotype 6, no adjuvant chemo recommended.   -We reviewed the rationale for  adjuvant endocrine therapy being increased likelihood of cure of early stage breast cancer.  We discussed schedule, planned duration of 5 years, and potential side effects including but not limited to fracture, bone loss, arthralgias, risk for accelerated cardiovascular disease/stroke, and vasomotor symptoms. Informed consent was obtained, and the patient elected to proceed with adjuvant Arimidex. Script sent, initiate 2 weeks after radiation completes.     3. Osteopenia  Bone health  -Aromatase inhibitor use is associated with risk for accelerated age related bone loss.  -She will need baseline DEXA at the time of endocrine therapy initiation.  Pending  -Plan to repeat DEXA every 2-years while on endocrine therapy.    -I encourage adequate calcium intake targeting 500 -1000 mg daily through diet or supplement, vitamin D supplementation of at least 1000 IU daily. Weightbearing exercise as tolerated can also help maintain bone density.    Follow Up:   3 mo      Liseth Doran MD  Hematology and Oncology

## 2025-05-01 ENCOUNTER — HOSPITAL ENCOUNTER (OUTPATIENT)
Facility: HOSPITAL | Age: 70
Setting detail: RADIATION/ONCOLOGY SERIES
End: 2025-05-01
Payer: MEDICARE

## 2025-05-01 ENCOUNTER — HOSPITAL ENCOUNTER (OUTPATIENT)
Facility: HOSPITAL | Age: 70
Discharge: HOME | End: 2025-05-01

## 2025-05-01 ENCOUNTER — DOCUMENTATION (OUTPATIENT)
Dept: NUTRITION | Facility: HOSPITAL | Age: 70
End: 2025-05-01
Payer: MEDICARE

## 2025-05-01 VITALS — WEIGHT: 169 LBS | BODY MASS INDEX: 31.93 KG/M2

## 2025-05-01 LAB
RAD ONC ARIA COURSE ID: 1
RAD ONC ARIA COURSE INTENT: NORMAL
RAD ONC ARIA COURSE LAST TREATMENT DATE: NORMAL
RAD ONC ARIA COURSE START DATE: NORMAL
RAD ONC ARIA COURSE TREATMENT ELAPSED DAYS: 21
RAD ONC ARIA FIRST TREATMENT DATE: NORMAL
RAD ONC ARIA PLAN FRACTIONS TREATED TO DATE: 1
RAD ONC ARIA PLAN ID: NORMAL
RAD ONC ARIA PLAN NAME: NORMAL
RAD ONC ARIA PLAN PRESCRIBED DOSE PER FRACTION: 2 GY
RAD ONC ARIA PLAN PRIMARY REFERENCE POINT: NORMAL
RAD ONC ARIA PLAN TOTAL FRACTIONS PRESCRIBED: 5
RAD ONC ARIA PLAN TOTAL PRESCRIBED DOSE: 1000 CGY
RAD ONC ARIA REFERENCE POINT DOSAGE GIVEN TO DATE: 2 GY
RAD ONC ARIA REFERENCE POINT ID: NORMAL
RAD ONC ARIA REFERENCE POINT SESSION DOSAGE GIVEN: 2 GY

## 2025-05-01 PROCEDURE — 77412 RADIATION TX DELIVERY LVL 3: CPT | Performed by: RADIOLOGY

## 2025-05-01 NOTE — PROGRESS NOTES
ONC Nutrition     Diagnosis: Malignant neoplasm of upper-inner quadrant of left breast in female, estrogen receptor positive      Surgery: lumpectomy and sentinel lymph node biopsy 2/28/2025      Chemotherapy: follow up out to 2 weeks after radiation completes with Dr. Doran     Radiation: 40.05 Cantu delivered over 3 weeks with deep inspiration breath-hold technique. An additional 10 Gray boost will be given to the lumpectomy cavity      Weight:  4/10/25- 173#   4/17/25- 169#  4/24/25- 169#  5/1/25- 169#     Patient completed 15/15 + 1/5 boost treatments today.       Patient reports the past week has gone fine. She does report continued headaches but admits her fluid intake hasn't improved much. Her  reports they did at least buy smartwater. She also reports minor nausea, as well as, increased fatigue. RD re-emphasized the importance of adequate hydration and small, frequent meals. She reports noticing the nausea comes if she waits too long in between meals.      All questions/concerns addressed at this time.     Will follow as indicated.      Jesusita Maya, TIMOTHY, LD

## 2025-05-02 ENCOUNTER — HOSPITAL ENCOUNTER (OUTPATIENT)
Facility: HOSPITAL | Age: 70
Discharge: HOME | End: 2025-05-02

## 2025-05-02 LAB
RAD ONC ARIA COURSE ID: 1
RAD ONC ARIA COURSE INTENT: NORMAL
RAD ONC ARIA COURSE LAST TREATMENT DATE: NORMAL
RAD ONC ARIA COURSE START DATE: NORMAL
RAD ONC ARIA COURSE TREATMENT ELAPSED DAYS: 22
RAD ONC ARIA FIRST TREATMENT DATE: NORMAL
RAD ONC ARIA PLAN FRACTIONS TREATED TO DATE: 2
RAD ONC ARIA PLAN ID: NORMAL
RAD ONC ARIA PLAN NAME: NORMAL
RAD ONC ARIA PLAN PRESCRIBED DOSE PER FRACTION: 2 GY
RAD ONC ARIA PLAN PRIMARY REFERENCE POINT: NORMAL
RAD ONC ARIA PLAN TOTAL FRACTIONS PRESCRIBED: 5
RAD ONC ARIA PLAN TOTAL PRESCRIBED DOSE: 1000 CGY
RAD ONC ARIA REFERENCE POINT DOSAGE GIVEN TO DATE: 4 GY
RAD ONC ARIA REFERENCE POINT ID: NORMAL
RAD ONC ARIA REFERENCE POINT SESSION DOSAGE GIVEN: 2 GY

## 2025-05-02 PROCEDURE — 77412 RADIATION TX DELIVERY LVL 3: CPT | Performed by: RADIOLOGY

## 2025-05-05 ENCOUNTER — HOSPITAL ENCOUNTER (OUTPATIENT)
Facility: HOSPITAL | Age: 70
Discharge: HOME | End: 2025-05-05
Payer: MEDICARE

## 2025-05-05 LAB
RAD ONC ARIA COURSE ID: 1
RAD ONC ARIA COURSE INTENT: NORMAL
RAD ONC ARIA COURSE LAST TREATMENT DATE: NORMAL
RAD ONC ARIA COURSE START DATE: NORMAL
RAD ONC ARIA COURSE TREATMENT ELAPSED DAYS: 25
RAD ONC ARIA FIRST TREATMENT DATE: NORMAL
RAD ONC ARIA PLAN FRACTIONS TREATED TO DATE: 3
RAD ONC ARIA PLAN ID: NORMAL
RAD ONC ARIA PLAN NAME: NORMAL
RAD ONC ARIA PLAN PRESCRIBED DOSE PER FRACTION: 2 GY
RAD ONC ARIA PLAN PRIMARY REFERENCE POINT: NORMAL
RAD ONC ARIA PLAN TOTAL FRACTIONS PRESCRIBED: 5
RAD ONC ARIA PLAN TOTAL PRESCRIBED DOSE: 1000 CGY
RAD ONC ARIA REFERENCE POINT DOSAGE GIVEN TO DATE: 6 GY
RAD ONC ARIA REFERENCE POINT ID: NORMAL
RAD ONC ARIA REFERENCE POINT SESSION DOSAGE GIVEN: 2 GY

## 2025-05-05 PROCEDURE — 77412 RADIATION TX DELIVERY LVL 3: CPT | Performed by: RADIOLOGY

## 2025-05-06 ENCOUNTER — HOSPITAL ENCOUNTER (OUTPATIENT)
Facility: HOSPITAL | Age: 70
Discharge: HOME | End: 2025-05-06

## 2025-05-06 LAB
RAD ONC ARIA COURSE ID: 1
RAD ONC ARIA COURSE INTENT: NORMAL
RAD ONC ARIA COURSE LAST TREATMENT DATE: NORMAL
RAD ONC ARIA COURSE START DATE: NORMAL
RAD ONC ARIA COURSE TREATMENT ELAPSED DAYS: 26
RAD ONC ARIA FIRST TREATMENT DATE: NORMAL
RAD ONC ARIA PLAN FRACTIONS TREATED TO DATE: 4
RAD ONC ARIA PLAN ID: NORMAL
RAD ONC ARIA PLAN NAME: NORMAL
RAD ONC ARIA PLAN PRESCRIBED DOSE PER FRACTION: 2 GY
RAD ONC ARIA PLAN PRIMARY REFERENCE POINT: NORMAL
RAD ONC ARIA PLAN TOTAL FRACTIONS PRESCRIBED: 5
RAD ONC ARIA PLAN TOTAL PRESCRIBED DOSE: 1000 CGY
RAD ONC ARIA REFERENCE POINT DOSAGE GIVEN TO DATE: 8 GY
RAD ONC ARIA REFERENCE POINT ID: NORMAL
RAD ONC ARIA REFERENCE POINT SESSION DOSAGE GIVEN: 2 GY

## 2025-05-06 PROCEDURE — 77412 RADIATION TX DELIVERY LVL 3: CPT | Performed by: RADIOLOGY

## 2025-05-07 ENCOUNTER — HOSPITAL ENCOUNTER (OUTPATIENT)
Facility: HOSPITAL | Age: 70
Discharge: HOME | End: 2025-05-07

## 2025-05-07 LAB
RAD ONC ARIA COURSE ID: 1
RAD ONC ARIA COURSE INTENT: NORMAL
RAD ONC ARIA COURSE LAST TREATMENT DATE: NORMAL
RAD ONC ARIA COURSE START DATE: NORMAL
RAD ONC ARIA COURSE TREATMENT ELAPSED DAYS: 27
RAD ONC ARIA FIRST TREATMENT DATE: NORMAL
RAD ONC ARIA PLAN FRACTIONS TREATED TO DATE: 5
RAD ONC ARIA PLAN ID: NORMAL
RAD ONC ARIA PLAN NAME: NORMAL
RAD ONC ARIA PLAN PRESCRIBED DOSE PER FRACTION: 2 GY
RAD ONC ARIA PLAN PRIMARY REFERENCE POINT: NORMAL
RAD ONC ARIA PLAN TOTAL FRACTIONS PRESCRIBED: 5
RAD ONC ARIA PLAN TOTAL PRESCRIBED DOSE: 1000 CGY
RAD ONC ARIA REFERENCE POINT DOSAGE GIVEN TO DATE: 10 GY
RAD ONC ARIA REFERENCE POINT ID: NORMAL
RAD ONC ARIA REFERENCE POINT SESSION DOSAGE GIVEN: 2 GY

## 2025-05-07 PROCEDURE — 77336 RADIATION PHYSICS CONSULT: CPT | Performed by: RADIOLOGY

## 2025-05-07 PROCEDURE — 77412 RADIATION TX DELIVERY LVL 3: CPT | Performed by: RADIOLOGY

## 2025-05-13 LAB
RAD ONC ARIA COURSE END DATE: NORMAL
RAD ONC ARIA COURSE ID: 1
RAD ONC ARIA COURSE INTENT: NORMAL
RAD ONC ARIA COURSE LAST TREATMENT DATE: NORMAL
RAD ONC ARIA COURSE START DATE: NORMAL
RAD ONC ARIA COURSE TREATMENT ELAPSED DAYS: 27
RAD ONC ARIA FIRST TREATMENT DATE: NORMAL
RAD ONC ARIA PLAN FRACTIONS TREATED TO DATE: 15
RAD ONC ARIA PLAN FRACTIONS TREATED TO DATE: 5
RAD ONC ARIA PLAN ID: NORMAL
RAD ONC ARIA PLAN ID: NORMAL
RAD ONC ARIA PLAN NAME: NORMAL
RAD ONC ARIA PLAN NAME: NORMAL
RAD ONC ARIA PLAN PRESCRIBED DOSE PER FRACTION: 2 GY
RAD ONC ARIA PLAN PRESCRIBED DOSE PER FRACTION: 2.67 GY
RAD ONC ARIA PLAN PRIMARY REFERENCE POINT: NORMAL
RAD ONC ARIA PLAN PRIMARY REFERENCE POINT: NORMAL
RAD ONC ARIA PLAN TOTAL FRACTIONS PRESCRIBED: 15
RAD ONC ARIA PLAN TOTAL FRACTIONS PRESCRIBED: 5
RAD ONC ARIA PLAN TOTAL PRESCRIBED DOSE: 1000 CGY
RAD ONC ARIA PLAN TOTAL PRESCRIBED DOSE: 4005 CGY
RAD ONC ARIA REFERENCE POINT DOSAGE GIVEN TO DATE: 10 GY
RAD ONC ARIA REFERENCE POINT DOSAGE GIVEN TO DATE: 40.05 GY
RAD ONC ARIA REFERENCE POINT ID: NORMAL
RAD ONC ARIA REFERENCE POINT ID: NORMAL

## 2025-06-17 ENCOUNTER — HOSPITAL ENCOUNTER (OUTPATIENT)
Dept: RADIATION ONCOLOGY | Facility: HOSPITAL | Age: 70
Setting detail: RADIATION/ONCOLOGY SERIES
Discharge: HOME | End: 2025-06-17
Payer: MEDICARE

## 2025-06-17 ENCOUNTER — OFFICE VISIT (OUTPATIENT)
Dept: RADIATION ONCOLOGY | Facility: HOSPITAL | Age: 70
End: 2025-06-17
Payer: MEDICARE

## 2025-06-17 VITALS
WEIGHT: 170 LBS | BODY MASS INDEX: 32.1 KG/M2 | OXYGEN SATURATION: 98 % | RESPIRATION RATE: 16 BRPM | HEIGHT: 61 IN | HEART RATE: 76 BPM | SYSTOLIC BLOOD PRESSURE: 148 MMHG | TEMPERATURE: 97.7 F | DIASTOLIC BLOOD PRESSURE: 76 MMHG

## 2025-06-17 DIAGNOSIS — Z17.0 MALIGNANT NEOPLASM OF UPPER-INNER QUADRANT OF LEFT BREAST IN FEMALE, ESTROGEN RECEPTOR POSITIVE: Primary | ICD-10-CM

## 2025-06-17 DIAGNOSIS — C50.212 MALIGNANT NEOPLASM OF UPPER-INNER QUADRANT OF LEFT BREAST IN FEMALE, ESTROGEN RECEPTOR POSITIVE: Primary | ICD-10-CM

## 2025-06-17 NOTE — PROGRESS NOTES
"FOLLOW UP NOTE    PATIENT:                                                      Funmilayo Cantu  MEDICAL RECORD #:                        [id number]  :                                                          1955  COMPLETION DATE:   2025  DIAGNOSIS:     Malignant neoplasm of upper-inner quadrant of left breast in female, estrogen receptor positive  - Stage IA (cT1b, cN0, cM0, G2, ER+, OK+, HER2-)  - Stage IA (pT1b, pN0(sn), cM0, G2, ER+, OK+, HER2-)        BRIEF HISTORY:    Funmilayo Cantu (Lynn\) 69-year-old female with a recent diagnosis of +/+/- invasive ductal carcinoma of the left breast managed by a left lumpectomy and sentinel lymph node biopsy on 2025 by Dr. Dorota Terrazas.Final pathology confirmed 0.8 cm focus of moderately differentiated ductal carcinoma.  Margins were clear closest posteriorly at less than 1 mm.  [place] lymph node was negative.  She underwent adjuvant whole breast radiation.  The left breast received 40.05GY/15 fractions with an additional 10 GY/5 fraction boost to the lumpectomy cavity.  She tolerated treatment well.  She reports at the end of following treatment she experienced fatigue and mild erythema of the left breast skin.  Erythema has resolved.  Patient reports some mild lingering fatigue.  No other side effects experienced.  The patient reports she is doing very well and remains physically active.  She initiated endocrine therapy with anastrozole 2025.  She reports tolerating well and only has intermittent hot flashes.  No swelling of her left upper extremity noted and has full range of motion.  Denies any aches or pains.  No other concerns at this time.    MEDICATIONS: Medication reconciliation for the patient was reviewed and confirmed in the electronic medical record.    Review of Systems:   Per HPI    Physical Exam:   Physical Exam  Constitutional:       Appearance: Normal appearance.   HENT:      Head: Normocephalic.      Mouth/Throat:      Mouth: Mucous " "membranes are moist.   Cardiovascular:      Rate and Rhythm: Normal rate.   Pulmonary:      Effort: Pulmonary effort is normal.   Chest:      Comments: Left breast and axillary surgical incisions are well-healed.  No erythema or moist desquamation present.  Mild hyperpigmentation of the left areola.  No masses or seromas.  No left IM, SCV, or axillary lymphadenopathy.  Abdominal:      General: Abdomen is flat.   Musculoskeletal:         General: Normal range of motion.      Cervical back: Normal range of motion.   Skin:     General: Skin is warm.   Neurological:      General: No focal deficit present.      Mental Status: She is alert and oriented to person, place, and time.   Psychiatric:         Mood and Affect: Mood normal.         Behavior: Behavior normal.         VITAL SIGNS:   Vitals:    06/17/25 0828   BP: 148/76   Pulse: 76   Resp: 16   Temp: 97.7 °F (36.5 °C)   TempSrc: Skin   SpO2: 98%   Weight: 77.1 kg (170 lb)   Height: 154.9 cm (61\")   PainSc: 0-No pain                 KPS %:  90    The following portions of the patient's history were reviewed and updated as appropriate: allergies, current medications, past family history, past medical history, past social history, past surgical history and problem list.            IMPRESSION:  Carcinoma of the left upper inner breast.  Stage IA (T1b, M0, M0).  This is moderately differentiated, strongly hormone receptor positive and HER2 negative.     Following left breast lumpectomy, she underwent adjuvant radiotherapy on the left as part of her breast conserving treatment, completing 4 weeks ago.  She tolerated treatment well.  She developed the anticipated grade 1 fatigue and grade 1 radiation dermatitis which have appropriately subsided.  Clinical exam of the left breast today is benign.  We discussed the role of local breast/scar massage, as well as stretching and range of motion exercises of the left upper extremity to minimize the risk of treatment related " fibrosis or lymphedema.  Recommend the use of topical Vitamin E.  Recommend maintaining a healthy lifestyle with a well balanced diet, calcium and vitamin D for osteoporosis prevention, and exercise as well as continue with recommended health and cancer screening guidelines.  The patient is now on adjuvant endocrine therapy with anastrazole, which she is tolerating.  We discussed follow-up intervals, including biannual diagnostic mammograms to alternate between the left and bilateral breasts, biannual clinical breast exams, and monthly self breast exams.      RECOMMENDATIONS: Return to clinic in 1 year for follow-up office visit.      I spent a total of 34 minutes on todays visit, with more than 15 minutes in direct face to face communication, and the remainder of the time spent in reviewing the relevant history, records, available imaging, and for documentation.             RADHA Bains    Errors in dictation may reflect use of voice recognition software and not all errors in transcription may have been detected prior to signing.

## 2025-06-23 NOTE — RADIATION TREATMENT SUMMARY
RADIATION ONCOLOGY COMPLETION NOTE    PATIENT:   Funmilayo Cantu  MEDICAL RECORD:  [id number]  :    1955  COMPLETION DATE: 2025  DIAGNOSIS:   Left breast cancer  Clinical stage IA (cT1b, cN0, cM0, G2, ER+, MS+, HER2-)  Pathologic stage IA (pT1b, pN0(sn), cM0, G2, ER+, MS+, HER2-)      BRIEF HISTORY:  This 69 y.o. patient completed radiotherapy.  She has a diagnosis of carcinoma of the left upper inner breast.  Stage IA (T1b, M0, M0).  This is moderately differentiated, strongly hormone receptor positive and HER2 negative.  She received postoperative radiotherapy as part of breast conserving treatment.      TREATMENT COURSE:  The left breast tissue received a dose of 40.05 Cantu delivered in 15 daily fractions of 2.67 Gray using custom shaped tangential fields.  The tumor bed received an additional boost of 10 Gray using electrons delivered in 5 fractions of 2 Gray.    DATES OF TREATMENT: 4/10/2025 through 2000    TOLERANCE:   typical for treatment site     STATUS:  no evidence of disease recurrence    DISPOSITION:  Follow up in Radiation Oncology in approximately 1 month.        Gilberto Hearn MD

## 2025-06-24 ENCOUNTER — HOSPITAL ENCOUNTER (OUTPATIENT)
Dept: BONE DENSITY | Facility: HOSPITAL | Age: 70
Discharge: HOME | End: 2025-06-24
Admitting: INTERNAL MEDICINE
Payer: MEDICARE

## 2025-06-24 DIAGNOSIS — Z78.0 POSTMENOPAUSAL: ICD-10-CM

## 2025-06-24 DIAGNOSIS — M85.80 OSTEOPENIA, UNSPECIFIED LOCATION: ICD-10-CM

## 2025-06-24 PROCEDURE — 77080 DXA BONE DENSITY AXIAL: CPT

## 2025-07-30 ENCOUNTER — OFFICE VISIT (OUTPATIENT)
Dept: ONCOLOGY | Facility: CLINIC | Age: 70
End: 2025-07-30
Payer: MEDICARE

## 2025-07-30 VITALS
BODY MASS INDEX: 32.1 KG/M2 | WEIGHT: 170 LBS | TEMPERATURE: 97.3 F | OXYGEN SATURATION: 95 % | HEART RATE: 85 BPM | SYSTOLIC BLOOD PRESSURE: 136 MMHG | HEIGHT: 61 IN | DIASTOLIC BLOOD PRESSURE: 89 MMHG

## 2025-07-30 DIAGNOSIS — M54.2 NECK PAIN: Primary | ICD-10-CM

## 2025-07-30 PROBLEM — M81.0 OSTEOPOROSIS: Status: ACTIVE | Noted: 2025-07-30

## 2025-07-30 NOTE — PROGRESS NOTES
Hematology and Oncology [place]  Office number [phone]    Fax number [phone]     Follow up     Date: 25    Patient Name: Funmilayo Cantu  MRN: [id number]  : 1955    Referring Physician: Dr. Kala Buitrago    Chief Complaint: Left breast cancer    Cancer Staging: IA    History of Present Illness: Funmilayo Cantu is a pleasant 69 y.o. female who presents today for evaluation of left breast cancer.     Screening mammogram 24 showed a 6 mm focal asymmetry in the LUIQ prompting further workup.     Left breast biopsy 24 showed invasive ductal carcinoma with lobular features, grade 2 (%, %, HER 2 negative 0+).     Left breast lumpectomy 25 showed 8 mm invasive ductal carcinoma grade 2 with margins negative by < 1 mm. [place] lymph nodes negative (0/1). Oncotype 6    Treatment history:  Radiation to complete 25  Anastrozole,  2025 to present  Reclast planning 2025     Interval history:  Neck pain x 2 weeks.    Occasional pain in left breast Usually when rolling over at night  Hot flashes at night, otherwise tolerating AI well.     Past Medical History:   Past Medical History:   Diagnosis Date    Breast cancer     Fracture of wrist     GERD (gastroesophageal reflux disease)     Hip arthrosis 2021    Hyperlipidemia     Hypertension     Kidney stones     HX OF    Knee swelling     Low back strain     Mild obesity 2017    Neck strain ?    Nephrolithiasis     Osteoarthritis 2017    Osteopenia 2017    Premature ventricular contractions 2017    with appropriate suppression on stress echocardiography.    Tendinitis of knee ?    Maybe   No personal history of myocardial infarction, cerebrovascular event, or venous thromboembolism.  Osteopenia    Past Surgical History:   Past Surgical History:   Procedure Laterality Date    BREAST LUMPECTOMY      CATARACT EXTRACTION Bilateral     CHOLECYSTECTOMY      COLONOSCOPY       DENTAL PROCEDURE      [place] teeth extraction.    FOOT SURGERY      Remove ingrown toenail    KIDNEY STONE SURGERY      Lithotripsy x2.    KNEE ARTHROSCOPY Right     TONSILLECTOMY      TOTAL KNEE ARTHROPLASTY Right 2024    Procedure: TOTAL KNEE ARTHROPLASTY WITH CORI ROBOT - RIGHT;  Surgeon: Irvin Del Cid MD;  Location: [facility];  Service: Robotics - Ortho;  Laterality: Right;       Family History:   Family History   Problem Relation Age of Onset    Heart disease Mother     Cancer Mother         Non-Hodgkin’s Lymphoma    Heart attack Father 70    Ovarian cancer Sister 49    Cancer Sister         Ovarian    Hyperlipidemia Brother     Diabetes Brother     Heart attack Maternal Grandmother     Heart attack Maternal Grandfather     Heart disease Paternal Grandmother     Cancer Paternal Grandfather         Mouth    Breast cancer Other 30       Social History:   Social History     Socioeconomic History    Marital status:    Tobacco Use    Smoking status: Former     Current packs/day: 0.00     Average packs/day: 0.5 packs/day for 6.7 years (3.3 ttl pk-yrs)     Types: Cigarettes     Start date: 9/10/1969     Quit date: 1976     Years since quittin.2    Smokeless tobacco: Never    Tobacco comments:     High school + one year college   Vaping Use    Vaping status: Never Used   Substance and Sexual Activity    Alcohol use: Yes     Alcohol/week: 2.0 standard drinks of alcohol     Types: 2 Drinks containing 0.5 oz of alcohol per week     Comment: Ulcer. 2 COCKTAILS IN THE EVENING    Drug use: No    Sexual activity: Yes     Partners: Male     Birth control/protection: Post-menopausal       Medications:     Current Outpatient Medications:     amLODIPine (NORVASC) 5 MG tablet, Take 1 tablet by mouth Daily., Disp: , Rfl:     anastrozole (ARIMIDEX) 1 MG tablet, Take 1 tablet by mouth Daily. Start 2 weeks after radiation completes., Disp: 30 tablet, Rfl: 3    aspirin 81 MG EC tablet, Take 1 tablet by  "mouth Daily., Disp: , Rfl:     atorvastatin (LIPITOR) 10 MG tablet, Take 1 tablet by mouth every night at bedtime., Disp: , Rfl:     cholecalciferol (VITAMIN D3) 1000 units tablet, Take 1 tablet by mouth Daily., Disp: , Rfl:     famotidine (PEPCID) 40 MG tablet, , Disp: , Rfl:     losartan (COZAAR) 50 MG tablet, Take 1 tablet by mouth Daily., Disp: , Rfl:     Restasis 0.05 % ophthalmic emulsion, , Disp: , Rfl:     Allergies:   No Known Allergies    Objective     Vital Signs:   Vitals:    07/30/25 1142   BP: 136/89   Pulse: 85   Temp: 97.3 °F (36.3 °C)   TempSrc: Infrared   SpO2: 95%   Weight: 77.1 kg (170 lb)   Height: 154.9 cm (60.98\")   PainSc: 0-No pain    Body mass index is 32.14 kg/m².   Pain Score    07/30/25 1142   PainSc: 0-No pain       ECOG Performance Status: 0 - Asymptomatic    Physical Exam:   General: No acute distress. Well appearing   HEENT: Normocephalic, atraumatic. Sclera anicteric.   Neck: supple, no adenopathy.   Cardiovascular: regular rate and rhythm. No murmurs.   Respiratory: Normal rate. Clear to auscultation bilaterally  Abdomen: Soft, nontender, non distended with normoactive bowel sounds  Lymph: no cervical, supraclavicular or axillary adenopathy  Neuro: Alert and oriented x 3. No focal deficits.     Laboratory/Imaging Reviewed:   No visits with results within 2 Week(s) from this visit.   Latest known visit with results is:   Orders Only on 05/13/2025   Component Date Value Ref Range Status    Course ID 05/13/2025 1   Final    Course Intent 05/13/2025 Post-op   Final    Course Start Date 05/13/2025 3/6/2025 12:43 PM   Final    Course End Date 05/13/2025 5/13/2025 10:30 AM   Final    Course First Treatment Date 05/13/2025 4/10/2025  3:10 PM   Final    Course Last Treatment Date 05/13/2025 5/7/2025  8:17 AM   Final    Course Elapsed Days 05/13/2025 27   Final    Reference Point ID 05/13/2025 Boost   Final    Reference Point Dosage Given to Da* 05/13/2025 10  Gy Final    Reference Point ID " 05/13/2025 Left Breast DIBH   Final    Reference Point Dosage Given to Da* 05/13/2025 40.05  Gy Final    Plan ID 05/13/2025 Boost   Final    Plan Name 05/13/2025 Boost   Final    Plan Fractions Treated to Date 05/13/2025 5   Final    Plan Total Fractions Prescribed 05/13/2025 5   Final    Plan Prescribed Dose Per Fraction 05/13/2025 2  Gy Final    Plan Total Prescribed Dose 05/13/2025 1,000  cGy Final    Plan Primary Reference Point 05/13/2025 Boost   Final    Plan ID 05/13/2025 LTBreast DIBH   Final    Plan Name 05/13/2025 LTBreast DIBH   Final    Plan Fractions Treated to Date 05/13/2025 15   Final    Plan Total Fractions Prescribed 05/13/2025 15   Final    Plan Prescribed Dose Per Fraction 05/13/2025 2.67  Gy Final    Plan Total Prescribed Dose 05/13/2025 4,005  cGy Final    Plan Primary Reference Point 05/13/2025 Left Breast DIBH   Final       No results found.    Procedures    Assessment / Plan      Assessment/Plan:   Left breast cancer, stage IA, ER positive/HER 2 negative  Aromatase inhibitor use  I reviewed the patient's history, imaging and pathology.   We discussed staging, prognosis and general principles of breast cancer therapy.  She has an early stage, estrogen sensitive breast cancer and has undergone curative intent surgery.  She will benefit from 5-10 years of endocrine therapy to reduce her risk for disease recurrence given that this is an estrogen sensitive tumor.   -Oncotype 6, no adjuvant chemo recommended.   -Tolerating AI well, continue.   -Breast surgeon exam planned this month.    3. Osteoporosis  -Aromatase inhibitor use is associated with risk for accelerated age related bone loss.  -She will need baseline DEXA at the time of endocrine therapy initiation.   -Ca/D recommended  -She has comorbid GERD. I recommended annual Reclast. We reviewed the schedule and side effects including but not limited to bone pain, renal dysfunction, osteonecrosis and allergic reaction. Discussed the importance  of routine dental care and need to avoid invasive dental procedures/notify dentist of medication use. Informed consent was obtained, and the patient elected to proceed pending dental clearance.  -I encourage calcium supplement targeting 500 -1000 mg daily through diet or supplement, vitamin D supplementation of at least 1000 IU daily.      Follow Up:   Reclast in one month  3 mo survivorship     Liseth Doran MD  Hematology and Oncology

## 2025-08-06 DIAGNOSIS — C50.212 MALIGNANT NEOPLASM OF UPPER-INNER QUADRANT OF LEFT BREAST IN FEMALE, ESTROGEN RECEPTOR POSITIVE: Primary | ICD-10-CM

## 2025-08-06 DIAGNOSIS — Z79.811 AROMATASE INHIBITOR USE: ICD-10-CM

## 2025-08-06 DIAGNOSIS — Z17.0 MALIGNANT NEOPLASM OF UPPER-INNER QUADRANT OF LEFT BREAST IN FEMALE, ESTROGEN RECEPTOR POSITIVE: Primary | ICD-10-CM

## 2025-08-06 RX ORDER — ANASTROZOLE 1 MG/1
1 TABLET ORAL DAILY
Qty: 30 TABLET | Refills: 0 | Status: SHIPPED | OUTPATIENT
Start: 2025-08-06

## 2025-08-11 ENCOUNTER — TRANSCRIBE ORDERS (OUTPATIENT)
Dept: MAMMOGRAPHY | Facility: HOSPITAL | Age: 70
End: 2025-08-11
Payer: MEDICARE

## 2025-08-11 ENCOUNTER — TRANSCRIBE ORDERS (OUTPATIENT)
Dept: ADMINISTRATIVE | Facility: HOSPITAL | Age: 70
End: 2025-08-11
Payer: MEDICARE

## 2025-08-11 DIAGNOSIS — R92.8 ABNORMAL MAMMOGRAM: Primary | ICD-10-CM

## 2025-08-26 DIAGNOSIS — M81.0 AGE-RELATED OSTEOPOROSIS WITHOUT CURRENT PATHOLOGICAL FRACTURE: Primary | ICD-10-CM

## 2025-08-27 ENCOUNTER — APPOINTMENT (OUTPATIENT)
Facility: HOSPITAL | Age: 70
End: 2025-08-27
Payer: MEDICARE

## 2025-08-27 ENCOUNTER — INFUSION (OUTPATIENT)
Facility: HOSPITAL | Age: 70
End: 2025-08-27
Payer: MEDICARE

## 2025-08-27 VITALS
BODY MASS INDEX: 32.27 KG/M2 | HEIGHT: 61 IN | TEMPERATURE: 97.9 F | HEART RATE: 74 BPM | SYSTOLIC BLOOD PRESSURE: 140 MMHG | RESPIRATION RATE: 16 BRPM | DIASTOLIC BLOOD PRESSURE: 72 MMHG | WEIGHT: 170.9 LBS

## 2025-08-27 DIAGNOSIS — M81.0 AGE-RELATED OSTEOPOROSIS WITHOUT CURRENT PATHOLOGICAL FRACTURE: Primary | ICD-10-CM

## 2025-08-27 LAB
ALBUMIN SERPL-MCNC: 3.7 G/DL (ref 3.5–5.2)
ALBUMIN/GLOB SERPL: 1.4 G/DL
ALP SERPL-CCNC: 91 U/L (ref 39–117)
ALT SERPL W P-5'-P-CCNC: 16 U/L (ref 1–33)
ANION GAP SERPL CALCULATED.3IONS-SCNC: 9 MMOL/L (ref 5–15)
AST SERPL-CCNC: 19 U/L (ref 1–32)
BASOPHILS # BLD AUTO: 0.02 10*3/MM3 (ref 0–0.2)
BASOPHILS NFR BLD AUTO: 0.3 % (ref 0–1.5)
BILIRUB SERPL-MCNC: 0.4 MG/DL (ref 0–1.2)
BUN SERPL-MCNC: 15.5 MG/DL (ref 8–23)
BUN/CREAT SERPL: 14.9 (ref 7–25)
CALCIUM SPEC-SCNC: 8.9 MG/DL (ref 8.6–10.5)
CHLORIDE SERPL-SCNC: 106 MMOL/L (ref 98–107)
CO2 SERPL-SCNC: 25 MMOL/L (ref 22–29)
CREAT SERPL-MCNC: 1.04 MG/DL (ref 0.57–1)
DEPRECATED RDW RBC AUTO: 46.2 FL (ref 37–54)
EGFRCR SERPLBLD CKD-EPI 2021: 58.3 ML/MIN/1.73
EOSINOPHIL # BLD AUTO: 0.25 10*3/MM3 (ref 0–0.4)
EOSINOPHIL NFR BLD AUTO: 3.2 % (ref 0.3–6.2)
ERYTHROCYTE [DISTWIDTH] IN BLOOD BY AUTOMATED COUNT: 12.3 % (ref 12.3–15.4)
GLOBULIN UR ELPH-MCNC: 2.7 GM/DL
GLUCOSE SERPL-MCNC: 119 MG/DL (ref 65–99)
HCT VFR BLD AUTO: 43.7 % (ref 34–46.6)
HGB BLD-MCNC: 14.2 G/DL (ref 12–15.9)
IMM GRANULOCYTES # BLD AUTO: 0.09 10*3/MM3 (ref 0–0.05)
IMM GRANULOCYTES NFR BLD AUTO: 1.2 % (ref 0–0.5)
LYMPHOCYTES # BLD AUTO: 2.46 10*3/MM3 (ref 0.7–3.1)
LYMPHOCYTES NFR BLD AUTO: 31.6 % (ref 19.6–45.3)
MAGNESIUM SERPL-MCNC: 1.9 MG/DL (ref 1.6–2.4)
MCH RBC QN AUTO: 33 PG (ref 26.6–33)
MCHC RBC AUTO-ENTMCNC: 32.5 G/DL (ref 31.5–35.7)
MCV RBC AUTO: 101.6 FL (ref 79–97)
MONOCYTES # BLD AUTO: 0.72 10*3/MM3 (ref 0.1–0.9)
MONOCYTES NFR BLD AUTO: 9.3 % (ref 5–12)
NEUTROPHILS NFR BLD AUTO: 4.24 10*3/MM3 (ref 1.7–7)
NEUTROPHILS NFR BLD AUTO: 54.4 % (ref 42.7–76)
PHOSPHATE SERPL-MCNC: 2.7 MG/DL (ref 2.5–4.5)
PLATELET # BLD AUTO: 257 10*3/MM3 (ref 140–450)
PMV BLD AUTO: 8.5 FL (ref 6–12)
POTASSIUM SERPL-SCNC: 4.5 MMOL/L (ref 3.5–5.2)
PROT SERPL-MCNC: 6.4 G/DL (ref 6–8.5)
RBC # BLD AUTO: 4.3 10*6/MM3 (ref 3.77–5.28)
SODIUM SERPL-SCNC: 140 MMOL/L (ref 136–145)
WBC NRBC COR # BLD AUTO: 7.78 10*3/MM3 (ref 3.4–10.8)

## 2025-08-27 PROCEDURE — 83735 ASSAY OF MAGNESIUM: CPT

## 2025-08-27 PROCEDURE — 25010000002 ZOLEDRONIC ACID 5 MG/100ML SOLUTION: Performed by: INTERNAL MEDICINE

## 2025-08-27 PROCEDURE — 25810000003 SODIUM CHLORIDE 0.9 % SOLUTION: Performed by: INTERNAL MEDICINE

## 2025-08-27 PROCEDURE — 96374 THER/PROPH/DIAG INJ IV PUSH: CPT

## 2025-08-27 PROCEDURE — 85025 COMPLETE CBC W/AUTO DIFF WBC: CPT

## 2025-08-27 PROCEDURE — 80053 COMPREHEN METABOLIC PANEL: CPT

## 2025-08-27 PROCEDURE — 84100 ASSAY OF PHOSPHORUS: CPT

## 2025-08-27 RX ORDER — SODIUM CHLORIDE 9 MG/ML
20 INJECTION, SOLUTION INTRAVENOUS ONCE
Status: CANCELLED | OUTPATIENT
Start: 2025-08-30

## 2025-08-27 RX ORDER — ZOLEDRONIC ACID 0.05 MG/ML
5 INJECTION, SOLUTION INTRAVENOUS ONCE
Status: CANCELLED | OUTPATIENT
Start: 2025-08-30

## 2025-08-27 RX ORDER — SODIUM CHLORIDE 9 MG/ML
20 INJECTION, SOLUTION INTRAVENOUS ONCE
Status: COMPLETED | OUTPATIENT
Start: 2025-08-27 | End: 2025-08-27

## 2025-08-27 RX ORDER — ZOLEDRONIC ACID 0.05 MG/ML
5 INJECTION, SOLUTION INTRAVENOUS ONCE
Status: COMPLETED | OUTPATIENT
Start: 2025-08-27 | End: 2025-08-27

## 2025-08-27 RX ADMIN — SODIUM CHLORIDE 20 ML/HR: 9 INJECTION, SOLUTION INTRAVENOUS at 14:03

## 2025-08-27 RX ADMIN — ZOLEDRONIC ACID 5 MG: 5 INJECTION, SOLUTION INTRAVENOUS at 14:03

## (undated) DEVICE — CVR FTSWITCH UNIV

## (undated) DEVICE — SYS CLS SKIN PREMIERPRO EXOFINFUSION 22CM

## (undated) DEVICE — PAD ARMBRD SURG CONVOL 7.5X20X2IN

## (undated) DEVICE — BLANKT WARM UPPR/BDY ARM/OUT 57X196CM

## (undated) DEVICE — SUT MONOCRYL PLS ANTIB UND 3/0  PS1 27IN

## (undated) DEVICE — ADHS SKIN PREMIERPRO EXOFIN TOPICAL HI/VISC .5ML

## (undated) DEVICE — PAD CAST SOF ROL STRL 6IN LF

## (undated) DEVICE — BLD SAG SYSTEM6 25X1.27X90MM

## (undated) DEVICE — SUT VIC 2/0  CT1 CR8 18IN VCP839D

## (undated) DEVICE — Device

## (undated) DEVICE — TBG PENCL TELESCP MEGADYNE SMOKE EVAC 10FT

## (undated) DEVICE — DRSNG PAD ABD 8X10IN STRL

## (undated) DEVICE — GLV SURG SENSICARE W/ALOE PF LF 9 STRL

## (undated) DEVICE — GLV SURG PREMIERPRO MIC LTX PF SZ8.5 BRN

## (undated) DEVICE — PAD GRND E/S MEGADYNE MONOPLR 2/PLT W/CORD A/ DISP

## (undated) DEVICE — HANDPC IRR SURGILAV TIP/HIFLO SX/TBG

## (undated) DEVICE — PK KN TOTL 10

## (undated) DEVICE — DRP SURG 1/2 40X58IN STRL

## (undated) DEVICE — SUT VIC 1 CT1 CR8 18IN UD VCP841D

## (undated) DEVICE — TRAP FLD MINIVAC MEGADYNE 100ML

## (undated) DEVICE — BNDG ELAS W/CLIP 6IN 10YD LF STRL

## (undated) DEVICE — KT PUMP INFUBLOCK MDL 2100 PMKITSOLIS

## (undated) DEVICE — TRY EPID SFTY 18G 3.5IN 1T7680

## (undated) DEVICE — NDL HYPO MONOJECT SFTY 18G 1 1/2IN PNK